# Patient Record
Sex: FEMALE | Race: WHITE | NOT HISPANIC OR LATINO | Employment: FULL TIME | URBAN - METROPOLITAN AREA
[De-identification: names, ages, dates, MRNs, and addresses within clinical notes are randomized per-mention and may not be internally consistent; named-entity substitution may affect disease eponyms.]

---

## 2017-02-16 ENCOUNTER — ALLSCRIPTS OFFICE VISIT (OUTPATIENT)
Dept: OTHER | Facility: OTHER | Age: 47
End: 2017-02-16

## 2017-02-16 ENCOUNTER — TRANSCRIBE ORDERS (OUTPATIENT)
Dept: ADMINISTRATIVE | Facility: HOSPITAL | Age: 47
End: 2017-02-16

## 2017-02-16 DIAGNOSIS — Z12.31 SCREENING MAMMOGRAM FOR HIGH-RISK PATIENT: Primary | ICD-10-CM

## 2017-06-15 ENCOUNTER — GENERIC CONVERSION - ENCOUNTER (OUTPATIENT)
Dept: OTHER | Facility: OTHER | Age: 47
End: 2017-06-15

## 2017-07-24 ENCOUNTER — HOSPITAL ENCOUNTER (OUTPATIENT)
Dept: MAMMOGRAPHY | Facility: CLINIC | Age: 47
Discharge: HOME | End: 2017-07-24
Payer: COMMERCIAL

## 2017-07-24 DIAGNOSIS — D05.10 INTRADUCTAL CARCINOMA IN SITU OF BREAST: ICD-10-CM

## 2017-07-24 PROCEDURE — G0279 TOMOSYNTHESIS, MAMMO: HCPCS

## 2017-07-24 PROCEDURE — G0204 DX MAMMO INCL CAD BI: HCPCS

## 2017-08-01 ENCOUNTER — GENERIC CONVERSION - ENCOUNTER (OUTPATIENT)
Dept: OTHER | Facility: OTHER | Age: 47
End: 2017-08-01

## 2017-08-08 ENCOUNTER — APPOINTMENT (OUTPATIENT)
Dept: LAB | Facility: MEDICAL CENTER | Age: 47
End: 2017-08-08
Payer: COMMERCIAL

## 2017-08-08 ENCOUNTER — TRANSCRIBE ORDERS (OUTPATIENT)
Dept: ADMINISTRATIVE | Facility: HOSPITAL | Age: 47
End: 2017-08-08

## 2017-08-08 DIAGNOSIS — Z00.8 HEALTH EXAMINATION IN POPULATION SURVEY: ICD-10-CM

## 2017-08-08 DIAGNOSIS — Z00.8 HEALTH EXAMINATION IN POPULATION SURVEY: Primary | ICD-10-CM

## 2017-08-08 LAB
CHOLEST SERPL-MCNC: 192 MG/DL (ref 50–200)
EST. AVERAGE GLUCOSE BLD GHB EST-MCNC: 108 MG/DL
HBA1C MFR BLD: 5.4 % (ref 4.2–6.3)
HDLC SERPL-MCNC: 53 MG/DL (ref 40–60)
LDLC SERPL CALC-MCNC: 116 MG/DL (ref 0–100)
TRIGL SERPL-MCNC: 113 MG/DL

## 2017-08-08 PROCEDURE — 83036 HEMOGLOBIN GLYCOSYLATED A1C: CPT

## 2017-08-08 PROCEDURE — 36415 COLL VENOUS BLD VENIPUNCTURE: CPT

## 2017-08-08 PROCEDURE — 80061 LIPID PANEL: CPT

## 2017-08-16 ENCOUNTER — ALLSCRIPTS OFFICE VISIT (OUTPATIENT)
Dept: OTHER | Facility: OTHER | Age: 47
End: 2017-08-16

## 2017-09-13 DIAGNOSIS — Z12.31 ENCOUNTER FOR SCREENING MAMMOGRAM FOR MALIGNANT NEOPLASM OF BREAST: ICD-10-CM

## 2017-10-09 ENCOUNTER — GENERIC CONVERSION - ENCOUNTER (OUTPATIENT)
Dept: OTHER | Facility: OTHER | Age: 47
End: 2017-10-09

## 2017-10-12 ENCOUNTER — GENERIC CONVERSION - ENCOUNTER (OUTPATIENT)
Dept: OTHER | Facility: OTHER | Age: 47
End: 2017-10-12

## 2017-10-12 PROCEDURE — G0145 SCR C/V CYTO,THINLAYER,RESCR: HCPCS | Performed by: OBSTETRICS & GYNECOLOGY

## 2017-10-14 ENCOUNTER — LAB REQUISITION (OUTPATIENT)
Dept: LAB | Facility: HOSPITAL | Age: 47
End: 2017-10-14
Payer: COMMERCIAL

## 2017-10-14 DIAGNOSIS — Z01.419 ENCOUNTER FOR GYNECOLOGICAL EXAMINATION WITHOUT ABNORMAL FINDING: ICD-10-CM

## 2017-10-26 LAB
LAB AP GYN PRIMARY INTERPRETATION: NORMAL
Lab: NORMAL

## 2017-10-28 ENCOUNTER — GENERIC CONVERSION - ENCOUNTER (OUTPATIENT)
Dept: OTHER | Facility: OTHER | Age: 47
End: 2017-10-28

## 2018-01-11 NOTE — PROGRESS NOTES
Assessment    1  Greater trochanteric bursitis of right hip (726 5) (M70 61)   2  Pain, pelvic, female (625 9) (R10 2)    Plan  Greater trochanteric bursitis of right hip    · Betamethasone Sod Phos & Acet 6 (3-3) MG/ML Injection Suspension   · Follow-up visit in 3 months Evaluation and Treatment  Follow-up  Status: Complete   Done: [id number]  Pain, pelvic, female    · 2 - Cheri IRIZARRY, Gaviota Fermin  (General Surgery) Physician Referral  Consult  1/26 @ 430p  Status: Active  Requested for: 20Jan2016  Care Summary provided  : Yes    Discussion/Summary    Patient seen by Dr Jeison Aviles as well as myself today  We reviewed the MRI findings with Connor Nunez  On today's exam, she does have some reproducible tenderness over her trochanter bursa  Her symptoms may be an atypical presentation of bursitis  We did offer her an injection into that area  We also recommended referral to a general surgeon to rule out any other physiological cause as there is nothing else orthopedic in nature that we can explain her symptoms  She'll follow up in 3 months  Chief Complaint    1  Hip Pain    History of Present Illness  HPI: Connor Nunez presents to the office today for followup evaluation of her right anterosuperior hip pain  Patient has undergone injections without relief therefore she was sent for an MRI  She is here today to review the results of her MRI  She states that the most recent injection did not provide her any relief, in fact she felt some pain radiating down the leg after the shot  The pain is slightly radiating to the more lateral aspect of her ASIS  Review of Systems    Constitutional: No fever, no chills, feels well, no tiredness, no recent weight gain or loss  Eyes: No complaints of eyesight problems, no red eyes  ENT: no loss of hearing, no nosebleeds, no sore throat  Cardiovascular: No complaints of chest pain, no palpitations, no leg claudication or lower extremity edema     Respiratory: no compliants of shortness of breath, no wheezing, no cough  Gastrointestinal: no complaints of abdominal pain, no constipation, no nausea or diarrhea, no vomiting, no bloody stools  Genitourinary: no complaints of dysuria, no incontinence  Musculoskeletal: as noted in HPI  Integumentary: no complaints of skin rash or lesion, no itching or dry skin, no skin wounds  Neurological: no complaints of headache, no confusion, no numbness or tingling, no dizziness  Endocrine: No complaints of muscle weakness, no feelings of weakness, no frequent urination, no excessive thirst    Psychiatric: No suicidal thoughts, no anxiety, no feelings of depression  ROS reviewed  Active Problems    1  Acute hip pain, right (719 45) (M25 551)   2  Carcinoma in situ, breast, ductal (233 0) (D05 10)   3  Chronic fatigue (780 79) (R53 82)   4  Encounter for routine gynecological examination with Papanicolaou smear of cervix   (V72 31,V76 2) (Z01 419,Z12 4)   5  Fracture of toe (826 0) (S92 919A)   6  GERD (gastroesophageal reflux disease) (530 81) (K21 9)   7  Greater trochanteric bursitis of right hip (726 5) (M70 61)   8  Seasonal allergies (477 9) (J30 2)   9  Tendinitis (726 90) (M77 9)    Past Medical History    · History of Abnormal white blood cell count (288 9) (D72 9)   · History of Benign paroxysmal positional vertigo (386 11) (H81 10)   · History of folliculitis (N22 6) (E94 7)   · History of radiation therapy (V15 3) (Z92 3)   · History of Neck pain (723 1) (M54 2)   · History of Seasickness (994 6)   · History of Visit for screening mammogram (V76 12) (Z12 31)    The active problems and past medical history were reviewed and updated today  Surgical History    · History of Breast Surgery Lumpectomy   · History of Colposcopy Cervix With Biopsy(S)    The surgical history was reviewed and updated today         Family History    · No pertinent family history    · Family history of Heart disease    · Family history of Diabetes    · Family history of Diabetes    · Family history of lung cancer (V16 1) (Z80 1)    · Family history of Pancreatic cancer    · Family history of Pancreatic carcinoma    · Family history of Breast cancer    The family history was reviewed and updated today  Social History    · Denied: Drug use   · Never a smoker   · Social alcohol use (F10 99)  The social history was reviewed and updated today  Current Meds   1  Daily Value Multivitamin Oral Tablet; Take 1 tablet daily; Therapy: (Recorded:90Mor2334) to Recorded   2  Fluticasone Propionate 50 MCG/ACT Nasal Suspension; USE 2 SPRAYS IN EACH   NOSTRIL ONCE DAILY; Therapy: 13Apr2015 to (Evaluate:07Apr2016); Last Rx:13Apr2015 Ordered    The medication list was reviewed and updated today  Allergies    1  Sulfa Drugs    Vitals   Recorded: [id number] 03:39PM   Heart Rate 96   Systolic 366   Diastolic 70   Height 5 ft 3 in   Weight 160 lb 6 08 oz   BMI Calculated 28 41   BSA Calculated 1 76     Physical Exam    Upon examination patient demonstrates good motion of the right hip  No reproducible pain  There is still some tenderness over the ASIS  No palpable mass  Some tenderness appreciated over the greater trochanter of the hip  Results/Data  * MRI HIP RIGHT WO CONTRAST [id number] 09:01AM Luke Ding     Test Name Result Flag Reference   MRI HIP RIGHT WO CONTRAST (Report)     MRI RIGHT HIP     INDICATION: slight sensitivity in rt illiac crest at anterior c  Patient states pain in right hip when bending and moving  No known injury or trauma  COMPARISON: None  TECHNIQUE: MR sequences were obtained of the right hip and pelvis including: Localizer, axial T2 fat sat, coronal T1/STIR, cone-down axial oblique PD of the affected hip, coronal PD of the affected hip, sagittal T2 fat sat of the affected hip  Images    were acquired on a 1 5 Juliana unit  Gadolinium was not used  FINDINGS:     RIGHT HIP:     -JOINT EFFUSION: None       -BONE MARROW SIGNAL AND ALIGNMENT: Normal marrow signal demonstrated without hip fracture or AVN  -ACETABULAR LABRUM: Intact  -TROCHANTERIC BURSA: Normal      LEFT HIP: (please note dedicated small field of view images were not made of the left hip joint limiting its evaluation )      -JOINT EFFUSION: None  -BONE MARROW SIGNAL AND ALIGNMENT: Normal marrow signal demonstrated without hip fracture or AVN  -ACETABULAR LABRUM: No gross abnormalities although evaluation is very limited  -TROCHANTERIC BURSA: Normal      REST OF PELVIS:     -BONE MARROW SIGNAL: Normal      -SI JOINTS AND SYMPHYSIS PUBIS: Intact  -VISUALIZED LUMBAR SPINE: Unremarkable  -MUSCULATURE: Intact with intact hamstring origins bilaterally  -PELVIC SOFT TISSUES: There is trace ascites in the pelvis, likely physiologic  SUBCUTANEOUS TISSUES: Normal       Normal MRI of the right hip  I personally reviewed the films/images/results in the office today  My interpretation follows  MRI Review MRI of the pelvis is reviewed which does not demonstrate any abnormalities  Procedure  Undersurface and the patient received 2 cc of lidocaine, 2 cc of Marcaine, and 2 cc of 6 mg betamethasone into the right trochanteric bursa  Injection was tolerated well the      Attending Note  Collaborating Physician Note: Collaborating Physician: I interviewed and examined the patient, I discussed the case with the Advanced Practitioner and reviewed the note, I supervised the Advanced Practitioner, I supervised the procedure performed by the Advanced Practitioner and I agree with the Advanced Practitioner note        Future Appointments    Date/Time Provider Specialty Site   01/28/2016 03:00 PM Ángel Denton MD Surgical Oncology [facility]     Signatures   Electronically signed by : Daniel Nav[facility]; Jan 20 2016  4:29PM EST                       (Author)    Electronically signed by : TIFFANIE Harris ; Jan 20 2016  4:51PM EST                       (Author)

## 2018-01-13 VITALS
SYSTOLIC BLOOD PRESSURE: 102 MMHG | HEART RATE: 70 BPM | BODY MASS INDEX: 28.41 KG/M2 | HEIGHT: 63 IN | WEIGHT: 160.31 LBS | TEMPERATURE: 98.1 F | DIASTOLIC BLOOD PRESSURE: 72 MMHG | RESPIRATION RATE: 16 BRPM

## 2018-01-13 VITALS
TEMPERATURE: 98.5 F | RESPIRATION RATE: 16 BRPM | DIASTOLIC BLOOD PRESSURE: 70 MMHG | SYSTOLIC BLOOD PRESSURE: 108 MMHG | BODY MASS INDEX: 28.94 KG/M2 | HEIGHT: 63 IN | HEART RATE: 74 BPM | WEIGHT: 163.31 LBS

## 2018-01-15 NOTE — RESULT NOTES
Verified Results  VAS UPPER LIMB VENOUS DUPLEX SCAN, UNILATERAL/LIMITED [id number] 03:06PM Rosie Luong Order Number: [id number]     Test Name Result Flag Reference   VAS UPPER LIMB VENOUS DUPLEX SCAN, UNILATERAL/LIMITED (Report)     [facility] REPORT   CLINICAL:   Indications: Right Limb Pain [M79 609]  Patient has a painful cord in her medial right upper arm and axilla  She has a   history of right breast cancer, lumpectomy, and radiation treatment about 1 5   years ago  Operative History:   Right Lumpectomy 2013   Risk Factors: The patient has no history of DVT, limb trauma or malignancy  Clinical: Right Upper Limb   There is complaint of pain  CONCLUSION:   Impression   RIGHT UPPER LIMB:   No evidence of acute or chronic deep vein thrombosis  No evidence of superficial thrombophlebitis noted  Doppler evaluation shows a normal response to augmentation maneuvers  Areas of the palpable cord on the medial upper right arm and in the right   axillary area, appear to correspond to very superficial, non-compressing veins,   suggesting superficial thrombophlebitis  Tech Note: Preliminary report given to Dr Glenda Pacheco by phone at 16:00 on   2/15/2016        SIGNATURE:   Electronically Signed by: Kelly Ruiz MD, RPVI on 2016-02-15 07:27:21 PM

## 2018-01-17 NOTE — RESULT NOTES
Verified Results  (1) THIN PREP PAP WITH IMAGING [id number] 11:35AM Loraine Moss     Test Name Result Flag Reference   LAB AP CASE REPORT (Report)     Gynecologic Cytology Report            Case: [id number]                  Authorizing Provider: Na Sauceda MD     Collected:      10/12/2017           First Screen:     OG Rodríguez Received:      10/16/2017 1024        Specimen:  LIQUID-BASED PAP, SCREENING, Endocervical   LAB AP GYN PRIMARY INTERPRETATION      Negative for intraepithelial lesion or malignancy  Electronically signed by OG Rodríguez on 10/26/2017 at 11:35 AM   LAB AP GYN SPECIMEN ADEQUACY      Satisfactory for evaluation  Absence of endocervical/transformation zone component  LAB AP GYN ADDITIONAL INFORMATION (Report)     [employer] FDA approved ,  and ThinPrep Imaging System are   utilized with strict adherence to the 's instruction manual to   prepare gynecologic and non-gynecologic cytology specimens for the   production of ThinPrep slides as well as for gynecologic ThinPrep imaging  These processes have been validated by our laboratory and/or by the     The Pap test is not a diagnostic procedure and should not be used as the   sole means to detect cervical cancer  It is only a screening procedure to   aid in the detection of cervical cancer and its precursors  Both   false-negative and false-positive results have been experienced  Your   patient's test result should be interpreted in this context together with   the history and clinical findings

## 2018-01-22 VITALS
WEIGHT: 161 LBS | DIASTOLIC BLOOD PRESSURE: 64 MMHG | SYSTOLIC BLOOD PRESSURE: 112 MMHG | HEIGHT: 63 IN | BODY MASS INDEX: 28.53 KG/M2

## 2018-02-16 ENCOUNTER — OFFICE VISIT (OUTPATIENT)
Dept: SURGICAL ONCOLOGY | Facility: CLINIC | Age: 48
End: 2018-02-16
Payer: COMMERCIAL

## 2018-02-16 VITALS
WEIGHT: 163 LBS | TEMPERATURE: 98.1 F | HEART RATE: 92 BPM | BODY MASS INDEX: 28.88 KG/M2 | SYSTOLIC BLOOD PRESSURE: 112 MMHG | DIASTOLIC BLOOD PRESSURE: 78 MMHG | HEIGHT: 63 IN | RESPIRATION RATE: 16 BRPM

## 2018-02-16 DIAGNOSIS — D05.11 DUCTAL CARCINOMA IN SITU (DCIS) OF RIGHT BREAST: Primary | ICD-10-CM

## 2018-02-16 PROCEDURE — 99213 OFFICE O/P EST LOW 20 MIN: CPT | Performed by: NURSE PRACTITIONER

## 2018-02-16 RX ORDER — FLUTICASONE PROPIONATE 50 MCG
SPRAY, SUSPENSION (ML) NASAL DAILY
COMMUNITY
Start: 2015-04-13 | End: 2021-06-22 | Stop reason: SDUPTHER

## 2018-02-16 NOTE — PROGRESS NOTES
Surgical Oncology Follow Up       [address] [facility],[address] [place] [address]  [facility] [address]  1970  [id number]  [address] [facility],[address] [place] [address]  [facility] [place] [id number]    Chief Complaint   Patient presents with    Breast Cancer       Assessment/Plan:  1  Ductal carcinoma in situ (DCIS) of right breast    - Mammo diagnostic bilateral w 3d & cad; Future  - 6 mo follow up visit      Discussion/Summary: Patient is a 57year old female that presents today for a six-month follow-up for right breast DCIS diagnosed in September 2014  At that time she underwent a right lumpectomy and completed whole breast radiation therapy  She had a bilateral diagnostic mammogram performed in July 2017 which was BI-RADS 2  She has no new complaints today  Denies headaches, back pain, bone pain, cough, SOB, abdominal pain  No worrisome exam findings  We will order a 3D diagnotic mammogram for July 2018 and we will see the patient back in 6 months or sooner if the need arises  She was instructed to call with any new concerns or symptoms  All of her questions were answered  History of Present Illness:        Ductal carcinoma in situ (DCIS) of right breast    8/25/2014 Initial Diagnosis     Ductal carcinoma in situ (DCIS) of right breast         9/23/2014 Surgery     Right lumpectomy (Dr Savannah Hernandez)          - 12/16/2014 Radiation     WBRT (Dr Antonino Sarabia, [place], [place])         2015 Genetic Testing     BRCA1, BRCA2 negative             -Interval History: Patient presents today for a 6 month follow up visit for right breast cancer diagnosed in 2014  She has no new complaints today  She had a bilateral mammogram performed on July 2017 which was BIRADS 2       Review of Systems:  Review of Systems   Constitutional: Negative for activity change, appetite change, chills, fatigue, fever and unexpected weight change  HENT: Negative for trouble swallowing  Eyes: Negative for pain, redness and visual disturbance  Respiratory: Negative for cough, shortness of breath and wheezing  Cardiovascular: Negative for chest pain, palpitations and leg swelling  Gastrointestinal: Negative for abdominal pain, constipation, diarrhea, nausea and vomiting  Endocrine: Negative for cold intolerance and heat intolerance  Musculoskeletal: Negative for arthralgias, back pain, gait problem and myalgias  Skin: Negative for color change and rash  Neurological: Negative for dizziness, syncope, light-headedness, numbness and headaches  Hematological: Negative for adenopathy  Psychiatric/Behavioral: Negative for agitation and confusion  All other systems reviewed and are negative  Patient Active Problem List   Diagnosis    Ductal carcinoma in situ (DCIS) of right breast    Chronic fatigue    Dermatitis    GERD (gastroesophageal reflux disease)    Greater trochanteric bursitis of right hip    Pain, pelvic, female    Seasonal allergies     Past Medical History:   Diagnosis Date    Benign paroxysmal vertigo      Past Surgical History:   Procedure Laterality Date    BREAST LUMPECTOMY W/ NEEDLE LOCALIZATION Right 09/23/2014    Dr Dempsey Heritaflynn / [facility]       Family History   Problem Relation Age of Onset    Pancreatic cancer Maternal Grandfather     Lung cancer Paternal Grandmother      Social History     Social History    Marital status: /Civil Union     Spouse name: N/A    Number of children: N/A    Years of education: N/A     Occupational History    Not on file       Social History Main Topics    Smoking status: Never Smoker    Smokeless tobacco: Never Used    Alcohol use Not on file    Drug use: Unknown    Sexual activity: Not on file     Other Topics Concern    Not on file     Social History Narrative    No narrative on file       Current Outpatient Prescriptions:     fluticasone (FLONASE) 50 mcg/act nasal spray, into each nostril Daily, Disp: , Rfl:     Multiple Vitamin (DAILY VALUE MULTIVITAMIN PO), Take 1 tablet by mouth daily, Disp: , Rfl:   Allergies   Allergen Reactions    Sulfa Antibiotics Rash     Vitals:    02/16/18 1456   BP: 112/78   Pulse: 92   Resp: 16   Temp: 98 1 °F (36 7 °C)       Physical Exam   Constitutional: She is oriented to person, place, and time  Vital signs are normal  She appears well-developed and well-nourished  No distress  HENT:   Head: Normocephalic and atraumatic  Neck: Normal range of motion  Cardiovascular: Normal rate, regular rhythm and normal heart sounds  Pulmonary/Chest: Effort normal and breath sounds normal    Bilateral breasts were examined in the sitting and supine position  Right breast surgical scar  There are no masses, skin nodules, nipple changes or nipple discharge  There is no bilateral supraclavicular or axillary lymphadenopathy noted  Abdominal: Soft  Normal appearance  She exhibits no mass  There is no hepatosplenomegaly  There is no tenderness  Musculoskeletal: Normal range of motion  Lymphadenopathy:     She has no axillary adenopathy  Right: No supraclavicular adenopathy present  Left: No supraclavicular adenopathy present  Neurological: She is alert and oriented to person, place, and time  Skin: Skin is warm, dry and intact  No rash noted  She is not diaphoretic  Psychiatric: She has a normal mood and affect  Her speech is normal    Vitals reviewed  Advance Care Planning/Advance Directives:  Discussed disease status, cancer treatment plans and/or cancer treatment goals with the patient

## 2018-02-20 ENCOUNTER — TELEPHONE (OUTPATIENT)
Dept: SURGICAL ONCOLOGY | Facility: CLINIC | Age: 48
End: 2018-02-20

## 2018-03-05 ENCOUNTER — TELEPHONE (OUTPATIENT)
Dept: SURGICAL ONCOLOGY | Facility: CLINIC | Age: 48
End: 2018-03-05

## 2018-03-05 NOTE — TELEPHONE ENCOUNTER
Spoke with patient- she is interested in meeting with our genetics dept again to discuss further testing  Our office will call to schedule this for her            ----- Message from Herminia Navarro sent at 2/19/2018  9:33 AM EST -----  Regarding: RE: Further genetic testing? If she is interested in additional testing, I would be happy to speak with her about it  She was a bit hesitant about genetic testing when I first saw her, and sometimes the larger tests can be a bit overwhelming for patients         ----- Message -----  From: JASON Robbins  Sent: 2/16/2018   3:27 PM  To: Herminia Navarro  Subject: Further genetic testing? Ayaan Strickland, I just saw Pily Craft as a breast cancer f/u  I noticed when she was tested she was only tested for BRCA mutations  Is this someone you would recommend receive further testing with a greater panel?      Thanks

## 2018-04-12 ENCOUNTER — OFFICE VISIT (OUTPATIENT)
Dept: FAMILY MEDICINE CLINIC | Facility: CLINIC | Age: 48
End: 2018-04-12
Payer: COMMERCIAL

## 2018-04-12 VITALS
HEIGHT: 63 IN | DIASTOLIC BLOOD PRESSURE: 82 MMHG | WEIGHT: 170.2 LBS | BODY MASS INDEX: 30.16 KG/M2 | SYSTOLIC BLOOD PRESSURE: 116 MMHG

## 2018-04-12 DIAGNOSIS — S39.012A STRAIN OF LUMBAR REGION, INITIAL ENCOUNTER: Primary | ICD-10-CM

## 2018-04-12 PROCEDURE — 3008F BODY MASS INDEX DOCD: CPT | Performed by: PHYSICIAN ASSISTANT

## 2018-04-12 PROCEDURE — 99214 OFFICE O/P EST MOD 30 MIN: CPT | Performed by: PHYSICIAN ASSISTANT

## 2018-04-12 RX ORDER — CYCLOBENZAPRINE HCL 5 MG
5 TABLET ORAL 3 TIMES DAILY PRN
Qty: 30 TABLET | Refills: 0 | Status: SHIPPED | OUTPATIENT
Start: 2018-04-12 | End: 2018-08-16 | Stop reason: HOSPADM

## 2018-04-12 RX ORDER — PREDNISONE 10 MG/1
TABLET ORAL
Qty: 20 TABLET | Refills: 0 | Status: SHIPPED | OUTPATIENT
Start: 2018-04-12 | End: 2018-08-16 | Stop reason: HOSPADM

## 2018-04-12 NOTE — PROGRESS NOTES
Assessment/Plan:    Can try a heating pad in 20 minute increments over the painful area  Continue OTC Motrin as directed  Diagnoses and all orders for this visit:    Strain of lumbar region, initial encounter  -     cyclobenzaprine (FLEXERIL) 5 mg tablet; Take 1 tablet (5 mg total) by mouth 3 (three) times a day as needed for muscle spasms for up to 21 doses  -     predniSONE 10 mg tablet; Days 1 and 2 take 4 tablets daily, days 3 and 4 take 3 tablets daily, days 5 and 6 and 2 tablets daily, days 7 and 8 take 1 tablet daily  Subjective:      Patient ID: Lokesh Vera is a 57 y o  female  Back Pain   This is a new problem  The current episode started yesterday  The problem occurs constantly  The problem is unchanged  The pain is present in the lumbar spine  The quality of the pain is described as aching, stabbing and cramping  The pain does not radiate  The pain is moderate  The pain is the same all the time  The symptoms are aggravated by bending, coughing, position and twisting  Stiffness is present all day  Pertinent negatives include no abdominal pain, bladder incontinence, bowel incontinence, chest pain, fever, leg pain, numbness, paresis, paresthesias, tingling or weakness  She has tried ice and NSAIDs for the symptoms  The treatment provided mild relief  Injury occurred yesterday while lifting the steps to her camper back in to a folded position  She twisted while lifting and felt the pain in her back immediately  The following portions of the patient's history were reviewed and updated as appropriate:   She  has a past medical history of Benign paroxysmal vertigo    She   Patient Active Problem List    Diagnosis Date Noted    Dermatitis 06/10/2016    Pain, pelvic, female 01/20/2016    Greater trochanteric bursitis of right hip 09/22/2015    Chronic fatigue 12/31/2014    Ductal carcinoma in situ (DCIS) of right breast 09/04/2014    GERD (gastroesophageal reflux disease) 09/04/2014    Seasonal allergies 09/04/2014     She  has a past surgical history that includes Breast lumpectomy w/ needle localization (Right, 09/23/2014) and Colposcopy w/ biopsy / curettage  Her family history includes Lung cancer in her paternal grandmother; Pancreatic cancer in her maternal grandfather  She  reports that she has never smoked  She has never used smokeless tobacco  Her alcohol and drug histories are not on file  Current Outpatient Prescriptions   Medication Sig Dispense Refill    fluticasone (FLONASE) 50 mcg/act nasal spray into each nostril Daily      Multiple Vitamin (DAILY VALUE MULTIVITAMIN PO) Take 1 tablet by mouth daily      cyclobenzaprine (FLEXERIL) 5 mg tablet Take 1 tablet (5 mg total) by mouth 3 (three) times a day as needed for muscle spasms for up to 21 doses 30 tablet 0    predniSONE 10 mg tablet Days 1 and 2 take 4 tablets daily, days 3 and 4 take 3 tablets daily, days 5 and 6 and 2 tablets daily, days 7 and 8 take 1 tablet daily  20 tablet 0     No current facility-administered medications for this visit  Current Outpatient Prescriptions on File Prior to Visit   Medication Sig    fluticasone (FLONASE) 50 mcg/act nasal spray into each nostril Daily    Multiple Vitamin (DAILY VALUE MULTIVITAMIN PO) Take 1 tablet by mouth daily     No current facility-administered medications on file prior to visit  She is allergic to sulfa antibiotics       Review of Systems   Constitutional: Negative for chills, fatigue and fever  Respiratory: Negative for shortness of breath and wheezing  Cardiovascular: Negative for chest pain  Gastrointestinal: Negative for abdominal pain and bowel incontinence  Genitourinary: Negative for bladder incontinence  Musculoskeletal: Positive for back pain, gait problem (walking slow due to back pain) and myalgias  Neurological: Negative for tingling, weakness, numbness and paresthesias           Objective:      /82 (BP Location: Left arm, Patient Position: Sitting)   Ht 5' 3" (1 6 m)   Wt 77 2 kg (170 lb 3 2 oz)   BMI 30 15 kg/m²          Physical Exam   Constitutional: She is oriented to person, place, and time  Cardiovascular: Normal rate, regular rhythm and normal heart sounds  Pulmonary/Chest: Effort normal and breath sounds normal  No respiratory distress  She has no wheezes  Musculoskeletal: She exhibits tenderness  Back:    Neurological: She is alert and oriented to person, place, and time  Skin: Skin is warm and dry  No erythema  Psychiatric: She has a normal mood and affect  Her behavior is normal  Judgment and thought content normal    Vitals reviewed

## 2018-06-29 ENCOUNTER — LAB (OUTPATIENT)
Dept: LAB | Facility: HOSPITAL | Age: 48
End: 2018-06-29

## 2018-06-29 ENCOUNTER — TRANSCRIBE ORDERS (OUTPATIENT)
Dept: ADMINISTRATIVE | Facility: HOSPITAL | Age: 48
End: 2018-06-29

## 2018-06-29 DIAGNOSIS — Z00.8 HEALTH EXAMINATION IN POPULATION SURVEY: Primary | ICD-10-CM

## 2018-06-29 DIAGNOSIS — Z00.8 HEALTH EXAMINATION IN POPULATION SURVEY: ICD-10-CM

## 2018-06-29 LAB
CHOLEST SERPL-MCNC: 193 MG/DL (ref 50–200)
EST. AVERAGE GLUCOSE BLD GHB EST-MCNC: 105 MG/DL
HBA1C MFR BLD: 5.3 % (ref 4.2–6.3)
HDLC SERPL-MCNC: 48 MG/DL (ref 40–60)
LDLC SERPL CALC-MCNC: 129 MG/DL (ref 0–100)
NONHDLC SERPL-MCNC: 145 MG/DL
TRIGL SERPL-MCNC: 78 MG/DL

## 2018-06-29 PROCEDURE — 36415 COLL VENOUS BLD VENIPUNCTURE: CPT

## 2018-06-29 PROCEDURE — 80061 LIPID PANEL: CPT

## 2018-06-29 PROCEDURE — 83036 HEMOGLOBIN GLYCOSYLATED A1C: CPT

## 2018-07-02 NOTE — PROGRESS NOTES
Please call the patient regarding her abnormal result   Cholesterol isn't bad but the LDL cholesterol was a little high if she can tweak her diet that would be the best thing to do if she can't tweaked her diet she may want to consider going on a low dose statin like 10 mg of lip generic Lipitor

## 2018-07-25 ENCOUNTER — HOSPITAL ENCOUNTER (OUTPATIENT)
Dept: MAMMOGRAPHY | Facility: CLINIC | Age: 48
Discharge: HOME | End: 2018-07-25
Payer: COMMERCIAL

## 2018-07-25 DIAGNOSIS — D05.11 DUCTAL CARCINOMA IN SITU (DCIS) OF RIGHT BREAST: ICD-10-CM

## 2018-07-25 PROCEDURE — 77066 DX MAMMO INCL CAD BI: CPT

## 2018-07-25 PROCEDURE — G0279 TOMOSYNTHESIS, MAMMO: HCPCS

## 2018-08-16 ENCOUNTER — OFFICE VISIT (OUTPATIENT)
Dept: SURGICAL ONCOLOGY | Facility: CLINIC | Age: 48
End: 2018-08-16
Payer: COMMERCIAL

## 2018-08-16 VITALS
SYSTOLIC BLOOD PRESSURE: 110 MMHG | HEART RATE: 70 BPM | DIASTOLIC BLOOD PRESSURE: 70 MMHG | BODY MASS INDEX: 28.53 KG/M2 | WEIGHT: 161 LBS | HEIGHT: 63 IN | RESPIRATION RATE: 14 BRPM | TEMPERATURE: 97.9 F

## 2018-08-16 DIAGNOSIS — D05.11 DUCTAL CARCINOMA IN SITU (DCIS) OF RIGHT BREAST: Primary | ICD-10-CM

## 2018-08-16 PROCEDURE — 99213 OFFICE O/P EST LOW 20 MIN: CPT | Performed by: NURSE PRACTITIONER

## 2018-08-16 NOTE — PROGRESS NOTES
Surgical Oncology Follow Up       [address] [place] [address]  CANCER CARE [facility] SURGICAL ONCOLOGY [address]  1970  [id number]  [address] [place] [address]  CANCER [facility] SURGICAL ONCOLOGY [facility] [id number]    Chief Complaint   Patient presents with    Breast Cancer     6 month       Assessment/Plan:  1  Ductal carcinoma in situ (DCIS) of right breast  - 6 mo f/u visit     Discussion/Summary: Patient is a 57year old female that presents today for a six-month follow-up for right breast cancer diagnosed in September 2014  Her pathology revealed DCIS, %, %  At that time she underwent a right lumpectomy and completed whole breast radiation therapy  She had a bilateral diagnostic mammogram performed on July 25, 2018 which was BI-RADS 1  She has no new complaints today  Denies headaches, back pain, bone pain, cough, SOB, abdominal pain  No worrisome exam findings  She was interested in meeting with our genetics counselor to see if she would be covered for testing of a larger panel, however, states that she ultimately canceled her appt because she didn't receive confirmation that the visit would be covered  She is interested in meeting with our genetics counselor in the future  I informed the patient that we do not currently have a genetic counselor but she could certain look into one of our network  She states she would rather wait until [facility] is offering genetic counseling again  I have encouraged her to call our office in a couple months, or we could readdress at her next visit  She is in agreement with this plan- she states she is ok with waiting for further testing  We will see the patient back in 6 months or sooner if the need arises  She was instructed to call with any new concerns or symptoms  All of her questions were answered        History of Present Illness:        Ductal carcinoma in situ (DCIS) of right breast    8/25/2014 Initial Diagnosis     Ductal carcinoma in situ (DCIS) of right breast         9/23/2014 Surgery     Right lumpectomy (Dr Jess Farah)          - 12/16/2014 Radiation     WBRT (Dr Maurice Ovalle, [place], [place])         2015 Genetic Testing     BRCA1, BRCA2 negative             -Interval History:  Patient presents today for a six-month follow-up for the right breast DCIS diagnosed in 2014  She had a bilateral mammogram performed on July 25th 2018 which was BI-RADS 1  Review of Systems:  Review of Systems   Constitutional: Negative for activity change, appetite change, chills, fatigue, fever and unexpected weight change  HENT: Negative for trouble swallowing  Eyes: Negative for pain, redness and visual disturbance  Respiratory: Negative for cough, shortness of breath and wheezing  Cardiovascular: Negative for chest pain, palpitations and leg swelling  Gastrointestinal: Negative for abdominal pain, constipation, diarrhea, nausea and vomiting  Endocrine: Negative for cold intolerance and heat intolerance  Musculoskeletal: Negative for arthralgias, back pain, gait problem and myalgias  Skin: Negative for color change and rash  Neurological: Negative for dizziness, syncope, light-headedness, numbness and headaches  Hematological: Negative for adenopathy  Psychiatric/Behavioral: Negative for agitation and confusion  All other systems reviewed and are negative        Patient Active Problem List   Diagnosis    Ductal carcinoma in situ (DCIS) of right breast    Chronic fatigue    Dermatitis    GERD (gastroesophageal reflux disease)    Greater trochanteric bursitis of right hip    Pain, pelvic, female    Seasonal allergies     Past Medical History:   Diagnosis Date    Benign paroxysmal vertigo      Past Surgical History:   Procedure Laterality Date    BREAST LUMPECTOMY W/ NEEDLE LOCALIZATION Right 09/23/2014    Dr Gautam Schofield BIOPSY / CURETTAGE       Family History   Problem Relation Age of Onset    Pancreatic cancer Maternal Grandfather     Lung cancer Paternal Grandmother      Social History     Social History    Marital status: /Civil Union     Spouse name: N/A    Number of children: N/A    Years of education: N/A     Occupational History    Not on file  Social History Main Topics    Smoking status: Never Smoker    Smokeless tobacco: Never Used    Alcohol use Not on file    Drug use: Unknown    Sexual activity: Not on file     Other Topics Concern    Not on file     Social History Narrative    No narrative on file       Current Outpatient Prescriptions:     fluticasone (FLONASE) 50 mcg/act nasal spray, into each nostril Daily, Disp: , Rfl:     Multiple Vitamin (DAILY VALUE MULTIVITAMIN PO), Take 1 tablet by mouth daily, Disp: , Rfl:   Allergies   Allergen Reactions    Sulfa Antibiotics Rash     Vitals:    08/16/18 1452   BP: 110/70   Pulse: 70   Resp: 14   Temp: 97 9 °F (36 6 °C)       Physical Exam   Constitutional: She is oriented to person, place, and time  Vital signs are normal  She appears well-developed and well-nourished  No distress  HENT:   Head: Normocephalic and atraumatic  Neck: Normal range of motion  Cardiovascular: Normal rate, regular rhythm and normal heart sounds  Pulmonary/Chest: Effort normal and breath sounds normal    Bilateral breasts were examined in the sitting and supine position  Right breast surgical scar present  There are no masses, skin nodules, nipple changes or nipple discharge  There is no bilateral supraclavicular or axillary lymphadenopathy noted  Abdominal: Soft  Normal appearance  She exhibits no mass  There is no hepatosplenomegaly  There is no tenderness  Musculoskeletal: Normal range of motion  Lymphadenopathy:     She has no axillary adenopathy  Right: No supraclavicular adenopathy present  Left: No supraclavicular adenopathy present  Neurological: She is alert and oriented to person, place, and time  Skin: Skin is warm, dry and intact  No rash noted  She is not diaphoretic  Psychiatric: She has a normal mood and affect  Her speech is normal    Vitals reviewed  Results:    Imaging  Mammo Diagnostic Bilateral W 3d & Cad    Result Date: 7/25/2018  Narrative: Patient History: Patient has history of cancer in the right breast at age 45 and had previous chest radiation therapy at age 45  Patient had tested negative for BRCA1  Family history of pancreatic cancer at age 79 in maternal grandfather  Radiation therapy of the right breast, October 2014  Malignant lumpectomy of the right breast, September 23, 2014  Pathology Report of both breasts, September 23, 2014  Malignant WB stereo breast biopsy of the right breast, August 22, 2014  Pathology Report of both breasts, August 22, 2014  Patient has never smoked  Patient's BMI is 26 6  Reason for exam: history of breast cancer, conservation therapy  Mammo Diagnostic Bilateral W DBT and CAD: July 25, 2018 - Check In #: [de-identified] 2D/3D Procedure 3D views: Bilateral MLO view(s) were taken  2D views: Bilateral CC view(s) were taken  Technologist: Taffy Carrel, R T (WILLIAM)(M) Prior study comparison: July 24, 2017, mammo diagnostic bilateral W DBT and CAD performed at [address] [place] [address]  July 7, 2016, mammo diagnostic bilateral W CAD performed at [address] [place] [address]  June 30, 2015, bilateral WB digitl bilat keny performed at [address] [place] [address]  August 11, 2014, bilateral digital screening mammogram, performed at [address] [facility]  There are scattered fibroglandular densities  No dominant soft tissue mass, architectural distortion or suspicious calcifications are noted in either breast   The skin and nipple contours are within normal limits  No significant changes when compared with prior studies   ACR BI-RADS® Assessments: BiRad:1 - Negative Recommendation: Routine screening mammogram of both breasts in 1 year  A reminder letter will be scheduled  The patient is scheduled in a reminder system for screening mammography  8-10% of cancers will be missed on mammography  Management of a palpable abnormality must be based on clinical grounds  Patients will be notified of their results via letter from our facility  Accredited by [employer] of Rad[employer] and FDA  Transcription Location: [address] [place], [place] [address]way: [id number] Risk Value(s): Myriad Table: 4 7%      I reviewed the above imaging data  Advance Care Planning/Advance Directives:  Discussed disease status, cancer treatment plans and/or cancer treatment goals with the patient

## 2018-09-17 ENCOUNTER — ANNUAL EXAM (OUTPATIENT)
Dept: OBGYN CLINIC | Facility: CLINIC | Age: 48
End: 2018-09-17
Payer: COMMERCIAL

## 2018-09-17 VITALS — WEIGHT: 160 LBS | BODY MASS INDEX: 28.34 KG/M2 | SYSTOLIC BLOOD PRESSURE: 124 MMHG | DIASTOLIC BLOOD PRESSURE: 66 MMHG

## 2018-09-17 DIAGNOSIS — Z01.419 ENCOUNTER FOR WELL WOMAN EXAM WITH ROUTINE GYNECOLOGICAL EXAM: Primary | ICD-10-CM

## 2018-09-17 DIAGNOSIS — Z12.4 ENCOUNTER FOR PAPANICOLAOU SMEAR FOR CERVICAL CANCER SCREENING: ICD-10-CM

## 2018-09-17 PROCEDURE — G0145 SCR C/V CYTO,THINLAYER,RESCR: HCPCS | Performed by: OBSTETRICS & GYNECOLOGY

## 2018-09-17 PROCEDURE — 99396 PREV VISIT EST AGE 40-64: CPT | Performed by: OBSTETRICS & GYNECOLOGY

## 2018-09-17 NOTE — PROGRESS NOTES
ASSESSMENT & PLAN: Mary Chao is a 57 y o  No obstetric history on file  with normal gynecologic exam     1   Routine well woman exam done today  2  Pap and HPV:  The patient's last pap and hpv was 2017  It was normal     Pap and cotesting was done today  Current ASCCP Guidelines reviewed  Requesting annual paps  3  Mammogram ordered  4  The following were reviewed in today's visit: breast self exam  5  Sees Dr Nafisa Wright - DCIS    CC:  Annual Gynecologic Examination    HPI: Mary Chao is a 57 y o  No obstetric history on file  who presents for annual gynecologic examination  She has the following concerns:  none    Health Maintenance:    She wears her seatbelt routinely  She does perform regular monthly self breast exams  She feels safe at home  Patient Active Problem List   Diagnosis    Ductal carcinoma in situ (DCIS) of right breast    Chronic fatigue    Dermatitis    GERD (gastroesophageal reflux disease)    Greater trochanteric bursitis of right hip    Pain, pelvic, female    Seasonal allergies       Past Medical History:   Diagnosis Date    Benign paroxysmal vertigo        Past Surgical History:   Procedure Laterality Date    BREAST LUMPECTOMY W/ NEEDLE LOCALIZATION Right 09/23/2014    Dr Holly Grover / [place]         Past OB/Gyn History:  OB History     No data available           Pt does not have menstrual issues     History of sexually transmitted infection: No   History of abnormal pap smears: No      Patient is currently sexually active  heterosexual  The current method of family planning is none  Family History   Problem Relation Age of Onset    Pancreatic cancer Maternal Grandfather     Lung cancer Paternal Grandmother        Social History:  Social History     Social History    Marital status: /Civil Union     Spouse name: N/A    Number of children: N/A    Years of education: N/A     Occupational History    Not on file  Social History Main Topics    Smoking status: Never Smoker    Smokeless tobacco: Never Used    Alcohol use Yes    Drug use: No    Sexual activity: Yes     Partners: Male     Birth control/ protection: None     Other Topics Concern    Not on file     Social History Narrative    No narrative on file        Allergies   Allergen Reactions    Sulfa Antibiotics Rash       Current Outpatient Prescriptions:     fluticasone (FLONASE) 50 mcg/act nasal spray, into each nostril Daily, Disp: , Rfl:     Multiple Vitamin (DAILY VALUE MULTIVITAMIN PO), Take 1 tablet by mouth daily, Disp: , Rfl:     Review of Systems:    Review of Systems  Constitutional :no fever, feels well, no tiredness, no recent weight gain or loss  ENT: no ear ache, no loss of hearing, no nosebleeds or nasal discharge, no sore throat or hoarseness  Cardiovascular: no complaints of slow or fast heart beat, no chest pain, no palpitations, no leg claudication or lower extremity edema  Respiratory: no complaints of shortness of shortness of breath, no JEFF  Breasts:no complaints of breast pain, breast lump, or nipple discharge  Gastrointestinal: no complaints of abdominal pain, constipation, nausea, vomiting, or diarrhea or bloody stools  Genitourinary : no complaints of dysuria, incontinence, pelvic pain, no dysmenorrhea, vaginal discharge or abnormal vaginal bleeding and as noted in HPI  Musculoskeletal: no complaints of arthralgia, no myalgia, no joint swelling or stiffness, no limb pain or swelling    Integumentary: no complaints of skin rash or lesion, itching or dry skin  Neurological: no complaints of headache, no confusion, no numbness or tingling, no dizziness or fainting    Objective      /66   Wt 72 6 kg (160 lb)   BMI 28 34 kg/m²     General:   appears stated age, cooperative, alert normal mood and affect   Neck: normal, supple,trachea midline, no masses   Heart: regular rate and rhythm, S1, S2 normal, no murmur, click, rub or gallop   Lungs: clear to auscultation bilaterally   Breasts: normal appearance, no masses or tenderness, Inspection negative, No nipple retraction or dimpling, No nipple discharge or bleeding, No axillary or supraclavicular adenopathy, Normal to palpation without dominant masses, scar from prior biopsy noted right breast   Abdomen: soft, non-tender, without masses or organomegaly   Vulva: normal, normal female genitalia, Bartholin's, Urethra, [place] normal, no lesions, normal female hair distribution   Vagina: normal vagina, no discharge, exudate, lesion, or erythema   Urethra: normal   Cervix: Normal, no discharge  PAP done  Uterus: normal size, contour, position, consistency, mobility, non-tender   Adnexa: normal adnexa   Lymphatic palpation of lymph nodes in neck, axilla, groin and/or other locations: no lymphadenopathy or masses noted   Skin normal skin turgor and no rashes     Psychiatric orientation to person, place, and time: normal  mood and affect: normal

## 2018-09-19 LAB
LAB AP GYN PRIMARY INTERPRETATION: NORMAL
Lab: NORMAL

## 2019-02-26 ENCOUNTER — OFFICE VISIT (OUTPATIENT)
Dept: SURGICAL ONCOLOGY | Facility: CLINIC | Age: 49
End: 2019-02-26
Payer: COMMERCIAL

## 2019-02-26 VITALS
WEIGHT: 154 LBS | TEMPERATURE: 97.7 F | HEART RATE: 72 BPM | HEIGHT: 63 IN | SYSTOLIC BLOOD PRESSURE: 108 MMHG | RESPIRATION RATE: 18 BRPM | BODY MASS INDEX: 27.29 KG/M2 | DIASTOLIC BLOOD PRESSURE: 60 MMHG

## 2019-02-26 DIAGNOSIS — Z80.9 FAMILY HISTORY OF CANCER: ICD-10-CM

## 2019-02-26 DIAGNOSIS — Z85.3 HISTORY OF RIGHT BREAST CANCER: Primary | ICD-10-CM

## 2019-02-26 DIAGNOSIS — Z08 ENCOUNTER FOR FOLLOW-UP EXAMINATION AFTER COMPLETED TREATMENT FOR MALIGNANT NEOPLASM: ICD-10-CM

## 2019-02-26 DIAGNOSIS — N64.4 PAIN OF LEFT BREAST: ICD-10-CM

## 2019-02-26 PROCEDURE — 99214 OFFICE O/P EST MOD 30 MIN: CPT | Performed by: NURSE PRACTITIONER

## 2019-02-26 NOTE — PROGRESS NOTES
Surgical Oncology Follow Up       [address] [place] [address],[address] [facility]  CANCER CARE [facility] SURGICAL ONCOLOGY [address]  1970  [id number]  [address] [place] [address],[address] [place] [address]  CANCER CARE [facility] SURGICAL ONCOLOGY [facility] [id number]    Chief Complaint   Patient presents with    Breast Cancer     Pt is here for a six month follow up       1  History of right breast cancer  - Mammo diagnostic bilateral w 3d & cad; Future  - 6 mo f/u visit    2  Encounter for follow-up examination after completed treatment for malignant neoplasm    3  Pain of left breast  - Mammo diagnostic left w 3d & cad; Future  - US breast left limited (diagnostic); Future  If imaging normal:  - Wear a supportive bra  Sports bras work well  - Decrease salty food intake  - Decrease caffeine intake  - Evening Primrose Oil - 1 mg capsule three times/day  Take for 1-2 months     -If breast pain is not relieved, you can stop taking  4  Family history of cancer  - patient would like to think about pursing further genetic testing- options include testing through [facility] at the [facility] office- she will call if she wishes to proceed      Discussion/Summary: Patient is a 54year old female that presents today for a six-month follow-up for right breast cancer diagnosed in September 2014  Her pathology revealed DCIS, %, %  At that time she underwent a right lumpectomy and completed whole breast radiation therapy  She had a bilateral diagnostic mammogram performed on July 25, 2018 which was BI-RADS 1  She complaints today of left upper outer breast pain x2 weeks  She states that she 1st noticed this while doing a self-breast exam   She does not appreciate any lumps  She denies headaches, back pain, bone pain, cough, shortness of breath, abdominal pain    On today's exam, I suspect that her pain is really the digit dense breast tissue in the upper outer quadrant of the left breast   However, since this is a new complaint, I will obtain a left diagnostic mammogram and left breast ultrasound for further evaluation  Assuming the imaging is negative, I have recommended evening Primrose oil, wearing a supportive bra and dietary modifications  I have instructed the patient to call if her pain does not improve or if she appreciates any changes on self-exam   I will order a bilateral 3D diagnostic mammogram for July 2019 and we will plan to see the patient back in 6 months or sooner if the need arises  She will call if she wishes to proceed with further genetic testing or if she has any other concerns or symptoms  All of her questions were answered  History of Present Illness:        Ductal carcinoma in situ (DCIS) of right breast    8/25/2014 Initial Diagnosis     Ductal carcinoma in situ (DCIS) of right breast         9/23/2014 Surgery     Right lumpectomy (Dr Shanique Duffy)          - 12/16/2014 Radiation     WBRT (Dr Jud Rushing, [place], [place])         2015 Genetic Testing     BRCA1, BRCA2 negative             -Interval History:  Patient presents today for six-month follow-up visit for right breast cancer diagnosed in 2014  She complaints today of left breast pain x2 weeks  Review of Systems:  Review of Systems   Constitutional: Negative for activity change, appetite change, chills, fatigue, fever and unexpected weight change  HENT: Negative for trouble swallowing  Eyes: Negative for pain, redness and visual disturbance  Respiratory: Negative for cough, shortness of breath and wheezing  Cardiovascular: Negative for chest pain, palpitations and leg swelling  Gastrointestinal: Negative for abdominal pain, constipation, diarrhea, nausea and vomiting  Endocrine: Negative for cold intolerance and heat intolerance  Musculoskeletal: Negative for arthralgias, back pain, gait problem and myalgias     Skin: Negative for color change and rash    Neurological: Negative for dizziness, syncope, light-headedness, numbness and headaches  Hematological: Negative for adenopathy  Psychiatric/Behavioral: Negative for agitation and confusion  All other systems reviewed and are negative  Patient Active Problem List   Diagnosis    Ductal carcinoma in situ (DCIS) of right breast    Chronic fatigue    Dermatitis    GERD (gastroesophageal reflux disease)    Greater trochanteric bursitis of right hip    Pain, pelvic, female    Seasonal allergies     Past Medical History:   Diagnosis Date    Benign paroxysmal vertigo      Past Surgical History:   Procedure Laterality Date    BREAST LUMPECTOMY W/ NEEDLE LOCALIZATION Right 09/23/2014    Dr Ashanti Godoy / [facility]       Family History   Problem Relation Age of Onset    Pancreatic cancer Maternal Grandfather     Lung cancer Paternal Grandmother      Social History     Socioeconomic History    Marital status: /Civil Union     Spouse name: Not on file    Number of children: Not on file    Years of education: Not on file    Highest education level: Not on file   Occupational History    Not on file   Social Needs    Financial resource strain: Not on file    Food insecurity:     Worry: Not on file     Inability: Not on file    Transportation needs:     Medical: Not on file     Non-medical: Not on file   Tobacco Use    Smoking status: Never Smoker    Smokeless tobacco: Never Used   Substance and Sexual Activity    Alcohol use:  Yes    Drug use: No    Sexual activity: Yes     Partners: Male     Birth control/protection: None   Lifestyle    Physical activity:     Days per week: Not on file     Minutes per session: Not on file    Stress: Not on file   Relationships    Social connections:     Talks on phone: Not on file     Gets together: Not on file     Attends Gnosticism service: Not on file     Active member of club or organization: Not on file     Attends meetings of clubs or organizations: Not on file     Relationship status: Not on file    Intimate partner violence:     Fear of current or ex partner: Not on file     Emotionally abused: Not on file     Physically abused: Not on file     Forced sexual activity: Not on file   Other Topics Concern    Not on file   Social History Narrative    Not on file       Current Outpatient Medications:     fluticasone (FLONASE) 50 mcg/act nasal spray, into each nostril Daily, Disp: , Rfl:     Multiple Vitamin (DAILY VALUE MULTIVITAMIN PO), Take 1 tablet by mouth daily, Disp: , Rfl:   Allergies   Allergen Reactions    Sulfa Antibiotics Rash     Vitals:    02/26/19 1501   BP: 108/60   Pulse: 72   Resp: 18   Temp: 97 7 °F (36 5 °C)       Physical Exam   Constitutional: She is oriented to person, place, and time  Vital signs are normal  She appears well-developed and well-nourished  No distress  HENT:   Head: Normocephalic and atraumatic  Neck: Normal range of motion  Cardiovascular: Normal rate, regular rhythm and normal heart sounds  Pulmonary/Chest: Effort normal and breath sounds normal    Bilateral breasts were examined in the sitting and supine position  Right breast surgical scar present  Dense breast tissue noted in upper outer quadrant of left breast- no dominant masses  There are no bilateral dominant masses, skin nodules or skin changes, nipple changes or nipple discharge  There is no bilateral supraclavicular or axillary lymphadenopathy noted  Abdominal: Soft  Normal appearance  She exhibits no mass  There is no hepatosplenomegaly  There is no tenderness  Musculoskeletal: Normal range of motion  Lymphadenopathy:     She has no axillary adenopathy  Right: No supraclavicular adenopathy present  Left: No supraclavicular adenopathy present  Neurological: She is alert and oriented to person, place, and time  Skin: Skin is warm, dry and intact  No rash noted  She is not diaphoretic     Psychiatric: She has a normal mood and affect  Her speech is normal    Vitals reviewed  Advance Care Planning/Advance Directives:  Discussed disease status, cancer treatment plans and/or cancer treatment goals with the patient

## 2019-02-26 NOTE — PATIENT INSTRUCTIONS
- Wear a supportive bra  Sports bras work well  - Decrease salty food intake  - Decrease caffeine intake  - Evening Primrose Oil - 1 mg capsule three times/day  Take for 1-2 months     -If breast pain is not relieved, you can stop taking

## 2019-02-28 ENCOUNTER — HOSPITAL ENCOUNTER (OUTPATIENT)
Dept: MAMMOGRAPHY | Facility: CLINIC | Age: 49
Discharge: HOME | End: 2019-02-28
Payer: COMMERCIAL

## 2019-02-28 ENCOUNTER — HOSPITAL ENCOUNTER (OUTPATIENT)
Dept: ULTRASOUND IMAGING | Facility: CLINIC | Age: 49
Discharge: HOME | End: 2019-02-28
Payer: COMMERCIAL

## 2019-02-28 VITALS — HEIGHT: 63 IN | BODY MASS INDEX: 27.29 KG/M2 | WEIGHT: 154 LBS

## 2019-02-28 DIAGNOSIS — N64.4 PAIN OF LEFT BREAST: ICD-10-CM

## 2019-02-28 PROCEDURE — 77065 DX MAMMO INCL CAD UNI: CPT

## 2019-02-28 PROCEDURE — 76642 ULTRASOUND BREAST LIMITED: CPT

## 2019-02-28 PROCEDURE — G0279 TOMOSYNTHESIS, MAMMO: HCPCS

## 2019-07-02 ENCOUNTER — APPOINTMENT (OUTPATIENT)
Dept: RADIOLOGY | Facility: MEDICAL CENTER | Age: 49
End: 2019-07-02
Payer: COMMERCIAL

## 2019-07-02 ENCOUNTER — OFFICE VISIT (OUTPATIENT)
Dept: FAMILY MEDICINE CLINIC | Facility: CLINIC | Age: 49
End: 2019-07-02
Payer: COMMERCIAL

## 2019-07-02 VITALS
BODY MASS INDEX: 27.75 KG/M2 | DIASTOLIC BLOOD PRESSURE: 60 MMHG | SYSTOLIC BLOOD PRESSURE: 104 MMHG | WEIGHT: 156.6 LBS | HEIGHT: 63 IN

## 2019-07-02 DIAGNOSIS — Z13.220 ENCOUNTER FOR SCREENING FOR LIPID DISORDER: ICD-10-CM

## 2019-07-02 DIAGNOSIS — G89.29 CHRONIC PAIN OF RIGHT THUMB: Primary | ICD-10-CM

## 2019-07-02 DIAGNOSIS — Z13.228 SCREENING FOR METABOLIC DISORDER: ICD-10-CM

## 2019-07-02 DIAGNOSIS — M79.644 CHRONIC PAIN OF RIGHT THUMB: ICD-10-CM

## 2019-07-02 DIAGNOSIS — M25.512 ACUTE PAIN OF LEFT SHOULDER: ICD-10-CM

## 2019-07-02 DIAGNOSIS — G89.29 CHRONIC PAIN OF RIGHT THUMB: ICD-10-CM

## 2019-07-02 DIAGNOSIS — M79.644 CHRONIC PAIN OF RIGHT THUMB: Primary | ICD-10-CM

## 2019-07-02 PROCEDURE — 3008F BODY MASS INDEX DOCD: CPT | Performed by: FAMILY MEDICINE

## 2019-07-02 PROCEDURE — 73130 X-RAY EXAM OF HAND: CPT

## 2019-07-02 PROCEDURE — 99213 OFFICE O/P EST LOW 20 MIN: CPT | Performed by: FAMILY MEDICINE

## 2019-07-02 NOTE — PROGRESS NOTES
Assessment/Plan: We will order an x-ray of the right thumb and if no fractures are noted MRI of the right thumb also start hand therapy and also make an appointment with hand orthopedics which can be canceled if the condition improves with regards to the left shoulder will start ultrasound treatments    Problem List Items Addressed This Visit     None      Visit Diagnoses     Chronic pain of right thumb    -  Primary    Acute pain of left shoulder               Diagnoses and all orders for this visit:    Chronic pain of right thumb    Acute pain of left shoulder        No problem-specific Assessment & Plan notes found for this encounter  Subjective:      Patient ID: Catrina Singh is a 54 y o  female  Clearnold Dillon is here today with 2 orthopedic problems she banged her right thumb off of a counter top and for has had literally months of pain at the base of the 1st metacarpal phalangeal joint on and she is now starting to have issues with  strength and removing caps and bottle and jar tops and her  strength is weak 2nd issue is pain in the trapezius muscle of the left shoulder this was an injury she received when she was pushing a [employer] rec cut [employer] act on top of a car care ear and that is about 2 weeks induration      The following portions of the patient's history were reviewed and updated as appropriate:   She has a past medical history of Benign paroxysmal vertigo  ,  does not have any pertinent problems on file  ,   has a past surgical history that includes Breast lumpectomy w/ needle localization (Right, 09/23/2014) and Colposcopy w/ biopsy / curettage  ,  family history includes Lung cancer in her paternal grandmother; Pancreatic cancer in her maternal grandfather  ,   reports that she has never smoked  She has never used smokeless tobacco  She reports that she drinks alcohol  She reports that she does not use drugs  ,  is allergic to sulfa antibiotics     Current Outpatient Medications   Medication Sig Dispense Refill    fluticasone (FLONASE) 50 mcg/act nasal spray into each nostril Daily      Multiple Vitamin (DAILY VALUE MULTIVITAMIN PO) Take 1 tablet by mouth daily       No current facility-administered medications for this visit  Review of Systems   Constitutional: Positive for activity change  Negative for appetite change, diaphoresis, fatigue and fever  HENT: Negative  Eyes: Negative  Respiratory: Negative for apnea, cough, chest tightness, shortness of breath and wheezing  Cardiovascular: Negative for chest pain, palpitations and leg swelling  Gastrointestinal: Negative for abdominal distention, abdominal pain, anal bleeding, constipation, diarrhea, nausea and vomiting  Endocrine: Negative for cold intolerance, heat intolerance, polydipsia, polyphagia and polyuria  Genitourinary: Negative for difficulty urinating, dysuria, flank pain, hematuria and urgency  Musculoskeletal: Positive for arthralgias  Negative for back pain, gait problem, joint swelling and myalgias  Right thumb pain and left shoulder pain   Skin: Negative for color change, rash and wound  Allergic/Immunologic: Negative for environmental allergies, food allergies and immunocompromised state  Neurological: Negative for dizziness, seizures, syncope, speech difficulty, numbness and headaches  Hematological: Negative for adenopathy  Does not bruise/bleed easily  Psychiatric/Behavioral: Negative for agitation, behavioral problems, hallucinations, sleep disturbance and suicidal ideas  Objective:  Vitals:    07/02/19 0701   BP: 104/60   BP Location: Left arm   Patient Position: Sitting   Cuff Size: Standard   Weight: 71 kg (156 lb 9 6 oz)   Height: 5' 3" (1 6 m)     Body mass index is 27 74 kg/m²  Physical Exam   Constitutional: She is oriented to person, place, and time  She appears well-developed and well-nourished  No distress  HENT:   Head: Normocephalic     Right Ear: External ear normal  Left Ear: External ear normal    Nose: Nose normal    Mouth/Throat: Oropharynx is clear and moist    Eyes: Pupils are equal, round, and reactive to light  Conjunctivae and EOM are normal  Right eye exhibits no discharge  Left eye exhibits no discharge  No scleral icterus  Neck: Normal range of motion  No tracheal deviation present  No thyromegaly present  Cardiovascular: Normal rate, regular rhythm and normal heart sounds  Exam reveals no gallop and no friction rub  No murmur heard  Pulmonary/Chest: Effort normal and breath sounds normal  No respiratory distress  She has no wheezes  Abdominal: Soft  Bowel sounds are normal  She exhibits no mass  There is no tenderness  There is no guarding  Musculoskeletal: She exhibits tenderness  She exhibits no edema or deformity  Lymphadenopathy:     She has no cervical adenopathy  Neurological: She is alert and oriented to person, place, and time  No cranial nerve deficit  Skin: Skin is warm and dry  No rash noted  She is not diaphoretic  No erythema  Psychiatric: She has a normal mood and affect   Thought content normal

## 2019-07-05 DIAGNOSIS — G89.29 CHRONIC PAIN OF RIGHT THUMB: Primary | ICD-10-CM

## 2019-07-05 DIAGNOSIS — M79.644 CHRONIC PAIN OF RIGHT THUMB: Primary | ICD-10-CM

## 2019-07-05 NOTE — RESULT ENCOUNTER NOTE
Please call the patient regarding her abnormal result    Mild arthritis of the base of the thumb refer patient to hand orthopedics such as Dr Ulysses Maltese 2 low because she may need an injection of steroids into the base of the thumb

## 2019-07-08 ENCOUNTER — OFFICE VISIT (OUTPATIENT)
Dept: OBGYN CLINIC | Facility: CLINIC | Age: 49
End: 2019-07-08
Payer: COMMERCIAL

## 2019-07-08 VITALS
DIASTOLIC BLOOD PRESSURE: 71 MMHG | WEIGHT: 155.6 LBS | BODY MASS INDEX: 27.57 KG/M2 | HEIGHT: 63 IN | SYSTOLIC BLOOD PRESSURE: 110 MMHG | HEART RATE: 67 BPM

## 2019-07-08 DIAGNOSIS — M25.80 SESAMOIDITIS: Primary | ICD-10-CM

## 2019-07-08 DIAGNOSIS — G89.29 CHRONIC PAIN OF RIGHT THUMB: ICD-10-CM

## 2019-07-08 DIAGNOSIS — M79.644 CHRONIC PAIN OF RIGHT THUMB: ICD-10-CM

## 2019-07-08 PROCEDURE — 99203 OFFICE O/P NEW LOW 30 MIN: CPT | Performed by: ORTHOPAEDIC SURGERY

## 2019-07-08 NOTE — PROGRESS NOTES
ASSESSMENT/PLAN:    Assessment:   Right thumb sesamoiditis    Plan:   Diagnostics reviewed and physical exam performed  Diagnosis, treatment options and associated risks were discussed with the patient including no treatment, nonsurgical treatment and potential for surgical intervention  The patient was given the opportunity to ask questions regarding each  No need for surgical intervention  Offered a cortisone injection, patient declined, and thumb brace  Activities as tolerated    Follow Up:  PRN    To Do Next Visit:  Advised to call    General Discussions:  Informed of bone contusion and how long it can take to heal     Operative Discussions:  None indicated    _____________________________________________________  CHIEF COMPLAINT:  Chief Complaint   Patient presents with    Right Thumb - Pain         SUBJECTIVE:  Refugio Kendrick is a 54 y o  RHD female who presents with thumb pain for the past 3 months after she bumped her thumb on her desk at work  Denies any numbness or tingling  Denies any bruising or significant swelling  Denies any erythema  Denies any nighttime  Denies any locking or catching  She tried a brace initially for a week  She has increased pain with gripping activities and opening jar lids        PAST MEDICAL HISTORY:  Past Medical History:   Diagnosis Date    Benign paroxysmal vertigo     Fracture of phalanx of toe 07/21/2015    Last assessed    Tendinitis 04/13/2015    Last assessed       PAST SURGICAL HISTORY:  Past Surgical History:   Procedure Laterality Date    BREAST LUMPECTOMY W/ NEEDLE LOCALIZATION Right 09/23/2014    Dr Tony Goodman / [facility]         FAMILY HISTORY:  Family History   Problem Relation Age of Onset    Pancreatic cancer Maternal Grandfather     Lung cancer Paternal Grandmother     No Known Problems Mother     Heart disease Father     Diabetes Son     Breast cancer Family     Diabetes Family        SOCIAL HISTORY:  Social History     Tobacco Use    Smoking status: Never Smoker    Smokeless tobacco: Never Used   Substance Use Topics    Alcohol use: Yes    Drug use: No       MEDICATIONS:    Current Outpatient Medications:     fluticasone (FLONASE) 50 mcg/act nasal spray, into each nostril Daily, Disp: , Rfl:     Multiple Vitamin (DAILY VALUE MULTIVITAMIN PO), Take 1 tablet by mouth daily, Disp: , Rfl:     ALLERGIES:  Allergies   Allergen Reactions    Sulfa Antibiotics Rash       REVIEW OF SYSTEMS:  Pertinent items are noted in HPI  A comprehensive review of systems was negative  LABS:  HgA1c:   Lab Results   Component Value Date    HGBA1C 5 3 06/29/2018     BMP:   Lab Results   Component Value Date    GLUCOSE 92 12/31/2014    CALCIUM 9 0 12/31/2014     12/31/2014    K 4 5 12/31/2014    CO2 28 2 12/31/2014     12/31/2014    BUN 11 12/31/2014    CREATININE 0 78 12/31/2014         _____________________________________________________  PHYSICAL EXAMINATION:  Vital signs: /71   Pulse 67   Ht 5' 3" (1 6 m)   Wt 70 6 kg (155 lb 9 6 oz)   BMI 27 56 kg/m²   General: well developed and well nourished, alert, oriented times 3 and appears comfortable  Psychiatric: Normal  HEENT: Trachea Midline, No torticollis  Cardiovascular: No discernable arrhythmia  Pulmonary: No wheezing or stridor  Skin: No masses, erthema, lacerations, fluctation, ulcerations  Neurovascular: Sensation Intact to the Median, Ulnar, Radial Nerve, Motor Intact to the Median, Ulnar, Radial Nerve and Pulses Intact    MUSCULOSKELETAL EXAMINATION:  RIGHT SIDE:  CMC: Negative Grind, No tenderness to CMC, Negative Shoulder Sign, No Instability and + sesamoid stress test, tenderness radial sesamoidm nontender ulnar sesamoid, no UCL laxity, no volar plate or dorsal capsule laxity   flexor and extensor tendons intact     _____________________________________________________  STUDIES REVIEWED:  The attending physician has personally reviewed the pertinent films in PACS and interpretation is as follows:  Right hand x-rays taken 07/02/2019 were reviewed today and show:  No acute fracture dislocation, mild degenerative changes primarily in the 1st MCP joint with a cyst proximal phalanx          PROCEDURES PERFORMED:  Procedures  No Procedures performed today   Scribe Attestation    I,:   Aileen Powell am acting as a scribe while in the presence of the attending physician :        I,:   Fidel Dobson MD personally performed the services described in this documentation    as scribed in my presence :

## 2019-07-29 ENCOUNTER — HOSPITAL ENCOUNTER (OUTPATIENT)
Dept: MAMMOGRAPHY | Facility: CLINIC | Age: 49
Discharge: HOME | End: 2019-07-29
Payer: COMMERCIAL

## 2019-07-29 VITALS — WEIGHT: 155 LBS | HEIGHT: 63 IN | BODY MASS INDEX: 27.46 KG/M2

## 2019-07-29 DIAGNOSIS — Z85.3 HISTORY OF RIGHT BREAST CANCER: ICD-10-CM

## 2019-07-29 PROCEDURE — G0279 TOMOSYNTHESIS, MAMMO: HCPCS

## 2019-07-29 PROCEDURE — 77066 DX MAMMO INCL CAD BI: CPT

## 2019-09-10 ENCOUNTER — OFFICE VISIT (OUTPATIENT)
Dept: SURGICAL ONCOLOGY | Facility: CLINIC | Age: 49
End: 2019-09-10
Payer: COMMERCIAL

## 2019-09-10 VITALS
DIASTOLIC BLOOD PRESSURE: 70 MMHG | WEIGHT: 159 LBS | HEART RATE: 77 BPM | BODY MASS INDEX: 28.17 KG/M2 | HEIGHT: 63 IN | TEMPERATURE: 97.9 F | SYSTOLIC BLOOD PRESSURE: 120 MMHG | RESPIRATION RATE: 16 BRPM

## 2019-09-10 DIAGNOSIS — Z08 ENCOUNTER FOR FOLLOW-UP EXAMINATION AFTER COMPLETED TREATMENT FOR MALIGNANT NEOPLASM: Primary | ICD-10-CM

## 2019-09-10 DIAGNOSIS — Z12.31 VISIT FOR SCREENING MAMMOGRAM: ICD-10-CM

## 2019-09-10 DIAGNOSIS — Z85.3 HISTORY OF RIGHT BREAST CANCER: ICD-10-CM

## 2019-09-10 PROCEDURE — 99213 OFFICE O/P EST LOW 20 MIN: CPT | Performed by: NURSE PRACTITIONER

## 2019-09-10 NOTE — PROGRESS NOTES
Surgical Oncology Follow Up       [address] [facility]  [facility]ER [facility] [place]  [address] [address]  [facility] [address]    Lokesh Conception  1970  [phone]  [address] [facility]  CANCER [facility] [place]  [address] [id number]    Chief Complaint   Patient presents with    Breast Cancer     Pt is here for 6 month f/u       Assessment/Plan:  1  Encounter for follow-up examination after completed treatment for malignant neoplasm    2  History of right breast cancer  - 1 year f/u visit    3  Visit for screening mammogram  - Mammo screening bilateral w 3d & cad; Future      Discussion/Summary: Patient is a 54year old female that presents today for a six-month follow-up for right breast cancer diagnosed in September 2014  Her pathology revealed DCIS, %, %  At that time she underwent a right lumpectomy and completed whole breast radiation therapy  She had a bilateral diagnostic mammogram performed on July 29, 2019 which as BIRADS 2, category 2 density  She has no new complaints today and there are no concerns on today's exam   She is now 5 years from her diagnosis  Therefore, I will order a bilateral 3D screening mammogram for July of 2020 and we will plan to see the patient back in 1 year or sooner if the need arises  She was instructed to call with any new concerns or symptoms prior to that time  History of Present Illness:        History of right breast cancer    8/25/2014 Initial Diagnosis     Ductal carcinoma in situ (DCIS) of right breast      9/23/2014 Surgery     Right lumpectomy (Dr Jarod Rachel)       - 12/16/2014 Radiation     [place]      2015 Genetic Testing     BRCA1, BRCA2 negative          -Interval History:  Patient presents today for a six-month follow-up visit for right IH breast cancer diagnosed in August 2014   She had a bilateral diagnostic mammogram performed on July 29, 2019 which was BI-RADS 2, category 2 density  Her breast pain has resolved  She notices no changes on self-breast exam   Denies headaches, back pain, bone pain, cough, shortness of breath, abdominal pain  Review of Systems:  Review of Systems   Constitutional: Negative for activity change, appetite change, chills, fatigue, fever and unexpected weight change  HENT: Negative for trouble swallowing  Eyes: Negative for pain, redness and visual disturbance  Respiratory: Negative for cough, shortness of breath and wheezing  Cardiovascular: Negative for chest pain, palpitations and leg swelling  Gastrointestinal: Negative for abdominal pain, constipation, diarrhea, nausea and vomiting  Endocrine: Negative for cold intolerance and heat intolerance  Musculoskeletal: Negative for arthralgias, back pain, gait problem and myalgias  Skin: Negative for color change and rash  Neurological: Negative for dizziness, syncope, light-headedness, numbness and headaches  Hematological: Negative for adenopathy  Psychiatric/Behavioral: Negative for agitation and confusion  All other systems reviewed and are negative        Patient Active Problem List   Diagnosis    History of right breast cancer    Chronic fatigue    Dermatitis    GERD (gastroesophageal reflux disease)    Greater trochanteric bursitis of right hip    Pain, pelvic, female    Seasonal allergies    Encounter for follow-up examination after completed treatment for malignant neoplasm    Pain of left breast    Family history of cancer     Past Medical History:   Diagnosis Date    Benign paroxysmal vertigo     BRCA1 negative     BRCA2 negative     Breast cancer ([facility] [address] ) 2014    rt breast    Fracture of phalanx of toe 07/21/2015    Last assessed    History of radiation exposure     Tendinitis 04/13/2015    Last assessed     Past Surgical History:   Procedure Laterality Date    BREAST LUMPECTOMY W/ NEEDLE LOCALIZATION Right 09/23/2014    Dr Medina Ruiz W/ [facility] / [facility]       Family History   Problem Relation Age of Onset    Pancreatic cancer Maternal Grandfather     Lung cancer Paternal Grandmother     No Known Problems Mother     Heart disease Father     Diabetes Son     Breast cancer Family     Diabetes Family     No Known Problems Maternal Aunt     No Known Problems Maternal Aunt     No Known Problems Maternal Aunt     No Known Problems Paternal Aunt      Social History     Socioeconomic History    Marital status: /Civil Union     Spouse name: Not on file    Number of children: Not on file    Years of education: Not on file    Highest education level: Not on file   Occupational History    Not on file   Social Needs    Financial resource strain: Not on file    Food insecurity:     Worry: Not on file     Inability: Not on file    Transportation needs:     Medical: Not on file     Non-medical: Not on file   Tobacco Use    Smoking status: Never Smoker    Smokeless tobacco: Never Used   Substance and Sexual Activity    Alcohol use:  Yes    Drug use: No    Sexual activity: Yes     Partners: Male     Birth control/protection: None   Lifestyle    Physical activity:     Days per week: Not on file     Minutes per session: Not on file    Stress: Not on file   Relationships    Social connections:     Talks on phone: Not on file     Gets together: Not on file     Attends Orthodoxy service: Not on file     Active member of club or organization: Not on file     Attends meetings of clubs or organizations: Not on file     Relationship status: Not on file    Intimate partner violence:     Fear of current or ex partner: Not on file     Emotionally abused: Not on file     Physically abused: Not on file     Forced sexual activity: Not on file   Other Topics Concern    Not on file   Social History Narrative    Not on file       Current Outpatient Medications:     fluticasone (FLONASE) 50 mcg/act nasal spray, into each nostril Daily, Disp: , Rfl:     Multiple Vitamin (DAILY VALUE MULTIVITAMIN PO), Take 1 tablet by mouth daily, Disp: , Rfl:   Allergies   Allergen Reactions    Sulfa Antibiotics Rash     Vitals:    09/10/19 1520   BP: 120/70   Pulse: 77   Resp: 16   Temp: 97 9 °F (36 6 °C)       Physical Exam   Constitutional: She is oriented to person, place, and time  Vital signs are normal  She appears well-developed and well-nourished  No distress  HENT:   Head: Normocephalic and atraumatic  Neck: Normal range of motion  Cardiovascular: Normal rate, regular rhythm and normal heart sounds  Pulmonary/Chest: Effort normal and breath sounds normal    Bilateral breasts were examined in the sitting and supine position  Right breast surgical scar present  There are no bilateral dominant masses, skin nodules or skin changes, nipple changes or nipple discharge  There is no bilateral supraclavicular or axillary lymphadenopathy noted  Abdominal: Soft  Normal appearance  She exhibits no mass  There is no hepatosplenomegaly  There is no tenderness  Musculoskeletal: Normal range of motion  Lymphadenopathy:     She has no axillary adenopathy  Right: No supraclavicular adenopathy present  Left: No supraclavicular adenopathy present  Neurological: She is alert and oriented to person, place, and time  Skin: Skin is warm, dry and intact  No rash noted  She is not diaphoretic  Psychiatric: She has a normal mood and affect  Her speech is normal    Vitals reviewed  Advance Care Planning/Advance Directives:  Discussed disease status, cancer treatment plans and/or cancer treatment goals with the patient

## 2019-09-16 DIAGNOSIS — J30.1 SEASONAL ALLERGIC RHINITIS DUE TO POLLEN: Primary | ICD-10-CM

## 2019-09-16 RX ORDER — FLUTICASONE PROPIONATE 50 MCG
SPRAY, SUSPENSION (ML) NASAL
Qty: 48 G | Refills: 0 | Status: SHIPPED | OUTPATIENT
Start: 2019-09-16

## 2019-10-21 ENCOUNTER — ANNUAL EXAM (OUTPATIENT)
Dept: OBGYN CLINIC | Facility: CLINIC | Age: 49
End: 2019-10-21
Payer: COMMERCIAL

## 2019-10-21 VITALS — SYSTOLIC BLOOD PRESSURE: 110 MMHG | BODY MASS INDEX: 28.22 KG/M2 | WEIGHT: 159.3 LBS | DIASTOLIC BLOOD PRESSURE: 58 MMHG

## 2019-10-21 DIAGNOSIS — Z01.419 ENCOUNTER FOR WELL WOMAN EXAM WITH ROUTINE GYNECOLOGICAL EXAM: Primary | ICD-10-CM

## 2019-10-21 PROCEDURE — G0145 SCR C/V CYTO,THINLAYER,RESCR: HCPCS | Performed by: OBSTETRICS & GYNECOLOGY

## 2019-10-21 PROCEDURE — 99396 PREV VISIT EST AGE 40-64: CPT | Performed by: OBSTETRICS & GYNECOLOGY

## 2019-10-21 NOTE — PATIENT INSTRUCTIONS
Thank you for your confidence in our team    We appreciate you and welcome your feedback  If you receive a survey from us, please take a few moments to let us know how we are doing     Sincerely,   Rafa Fernandez MD

## 2019-10-21 NOTE — PROGRESS NOTES
ASSESSMENT & PLAN: Meredith Gabriel is a 54 y o  [id number] with normal gynecologic exam     1   Routine well woman exam done today  2  Pap and HPV:  The patient's last pap and hpv was   It was normal     Pap and cotesting was done today  Current ASCCP Guidelines reviewed  Requesting annual paps  3  Mammogram ordered  4  The following were reviewed in today's visit: breast self exam  5  Sees Dr Leah Mclean - DCIS    CC:  Annual Gynecologic Examination    HPI: Meredith Gabriel is a 54 y o  [id number]  who presents for annual gynecologic examination  She has the following concerns:  none    Health Maintenance:    She wears her seatbelt routinely  She does perform regular monthly self breast exams  She feels safe at home  Patient Active Problem List   Diagnosis    History of right breast cancer    Chronic fatigue    Dermatitis    GERD (gastroesophageal reflux disease)    Greater trochanteric bursitis of right hip    Pain, pelvic, female    Seasonal allergies    Encounter for follow-up examination after completed treatment for malignant neoplasm    Pain of left breast    Family history of cancer       Past Medical History:   Diagnosis Date    Benign paroxysmal vertigo     BRCA1 negative     BRCA2 negative     Breast cancer ([facility] [address] )     rt breast    Fracture of phalanx of toe 2015    Last assessed    History of radiation exposure     Tendinitis 2015    Last assessed       Past Surgical History:   Procedure Laterality Date    BREAST LUMPECTOMY W/ NEEDLE LOCALIZATION Right 2014    Dr Travis Medeiros / Yonatan Masterson         Past OB/Gyn History:  OB History        3    Para   3    Term   3            AB        Living   3       SAB        TAB        Ectopic        Multiple        Live Births   3                  Pt does not have menstrual issues      History of sexually transmitted infection: No   History of abnormal pap smears: No      Patient is currently sexually active  heterosexual  The current method of family planning is none      Family History   Problem Relation Age of Onset    Pancreatic cancer Maternal Grandfather     Lung cancer Paternal Grandmother     No Known Problems Mother     Heart disease Father     Diabetes Son     Breast cancer Family     Diabetes Family     No Known Problems Maternal Aunt     No Known Problems Maternal Aunt     No Known Problems Maternal Aunt     No Known Problems Paternal Aunt        Social History:  Social History     Socioeconomic History    Marital status: /Civil Union     Spouse name: Not on file    Number of children: Not on file    Years of education: Not on file    Highest education level: Not on file   Occupational History    Not on file   Social Needs    Financial resource strain: Not on file    Food insecurity:     Worry: Not on file     Inability: Not on file    Transportation needs:     Medical: Not on file     Non-medical: Not on file   Tobacco Use    Smoking status: Never Smoker    Smokeless tobacco: Never Used   Substance and Sexual Activity    Alcohol use: Yes     Frequency: 2-3 times a week    Drug use: No    Sexual activity: Yes     Partners: Male     Birth control/protection: None   Lifestyle    Physical activity:     Days per week: Not on file     Minutes per session: Not on file    Stress: Not on file   Relationships    Social connections:     Talks on phone: Not on file     Gets together: Not on file     Attends [employer] service: Not on file     Active member of club or organization: Not on file     Attends meetings of clubs or organizations: Not on file     Relationship status: Not on file    Intimate partner violence:     Fear of current or ex partner: Not on file     Emotionally abused: Not on file     Physically abused: Not on file     Forced sexual activity: Not on file   Other Topics Concern    Not on file   Social History Narrative    Not on file Allergies   Allergen Reactions    Sulfa Antibiotics Rash       Current Outpatient Medications:     fluticasone (FLONASE) 50 mcg/act nasal spray, into each nostril Daily, Disp: , Rfl:     fluticasone (FLONASE) 50 mcg/act nasal spray, INHALE 2 SPRAYS VIA EACH NOSTRIL ONCE DAILY, Disp: 48 g, Rfl: 0    Multiple Vitamin (DAILY VALUE MULTIVITAMIN PO), Take 1 tablet by mouth daily, Disp: , Rfl:     Review of Systems:    Review of Systems  Constitutional :no fever, feels well, no tiredness, no recent weight gain or loss  ENT: no ear ache, no loss of hearing, no nosebleeds or nasal discharge, no sore throat or hoarseness  Cardiovascular: no complaints of slow or fast heart beat, no chest pain, no palpitations, no leg claudication or lower extremity edema  Respiratory: no complaints of shortness of shortness of breath, no JEFF  Breasts:no complaints of breast pain, breast lump, or nipple discharge  Gastrointestinal: no complaints of abdominal pain, constipation, nausea, vomiting, or diarrhea or bloody stools  Genitourinary : no complaints of dysuria, incontinence, pelvic pain, no dysmenorrhea, vaginal discharge or abnormal vaginal bleeding and as noted in HPI  Musculoskeletal: no complaints of arthralgia, no myalgia, no joint swelling or stiffness, no limb pain or swelling    Integumentary: no complaints of skin rash or lesion, itching or dry skin  Neurological: no complaints of headache, no confusion, no numbness or tingling, no dizziness or fainting    Objective      /58   Wt 72 3 kg (159 lb 4 8 oz)   BMI 28 22 kg/m²     General:   appears stated age, cooperative, alert normal mood and affect   Neck: normal, supple,trachea midline, no masses   Heart: regular rate and rhythm, S1, S2 normal, no murmur, click, rub or gallop   Lungs: clear to auscultation bilaterally   Breasts: normal appearance, no masses or tenderness, Inspection negative, No nipple retraction or dimpling, No nipple discharge or bleeding, No axillary or supraclavicular adenopathy, Normal to palpation without dominant masses, scar from prior biopsy noted right breast   Abdomen: soft, non-tender, without masses or organomegaly   Vulva: normal, normal female genitalia, Bartholin's, Urethra, [place] normal, no lesions, normal female hair distribution   Vagina: normal vagina, no discharge, exudate, lesion, or erythema   Urethra: normal   Cervix: Normal, no discharge  PAP done  Uterus: normal size, contour, position, consistency, mobility, non-tender   Adnexa: normal adnexa   Lymphatic palpation of lymph nodes in neck, axilla, groin and/or other locations: no lymphadenopathy or masses noted   Skin normal skin turgor and no rashes     Psychiatric orientation to person, place, and time: normal  mood and affect: normal

## 2019-10-25 LAB
HPV HR 12 DNA CVX QL NAA+PROBE: ABNORMAL
HPV16 DNA CVX QL NAA+PROBE: ABNORMAL
HPV18 DNA CVX QL NAA+PROBE: ABNORMAL
LAB AP GYN PRIMARY INTERPRETATION: NORMAL
Lab: NORMAL

## 2020-07-30 ENCOUNTER — HOSPITAL ENCOUNTER (OUTPATIENT)
Dept: MAMMOGRAPHY | Facility: CLINIC | Age: 50
Discharge: HOME | End: 2020-07-30
Payer: COMMERCIAL

## 2020-07-30 VITALS — BODY MASS INDEX: 27.46 KG/M2 | TEMPERATURE: 97 F | HEIGHT: 63 IN | WEIGHT: 155 LBS

## 2020-07-30 DIAGNOSIS — Z12.31 VISIT FOR SCREENING MAMMOGRAM: ICD-10-CM

## 2020-07-30 PROCEDURE — 77067 SCR MAMMO BI INCL CAD: CPT

## 2020-07-30 PROCEDURE — 77063 BREAST TOMOSYNTHESIS BI: CPT

## 2020-09-10 ENCOUNTER — OFFICE VISIT (OUTPATIENT)
Dept: SURGICAL ONCOLOGY | Facility: CLINIC | Age: 50
End: 2020-09-10
Payer: COMMERCIAL

## 2020-09-10 VITALS
HEIGHT: 63 IN | SYSTOLIC BLOOD PRESSURE: 104 MMHG | DIASTOLIC BLOOD PRESSURE: 78 MMHG | TEMPERATURE: 98.1 F | HEART RATE: 98 BPM | WEIGHT: 152 LBS | BODY MASS INDEX: 26.93 KG/M2

## 2020-09-10 DIAGNOSIS — Z08 ENCOUNTER FOR FOLLOW-UP EXAMINATION AFTER COMPLETED TREATMENT FOR MALIGNANT NEOPLASM: Primary | ICD-10-CM

## 2020-09-10 DIAGNOSIS — Z12.31 VISIT FOR SCREENING MAMMOGRAM: ICD-10-CM

## 2020-09-10 DIAGNOSIS — Z85.3 HISTORY OF RIGHT BREAST CANCER: ICD-10-CM

## 2020-09-10 PROCEDURE — 99213 OFFICE O/P EST LOW 20 MIN: CPT | Performed by: NURSE PRACTITIONER

## 2020-09-10 NOTE — PROGRESS NOTES
Surgical Oncology Follow Up       [address] [facility]  [facility] [place]  [address]  [facility] [address]    Don Gould  1970  [id number]  [address] [facility],[address] [place] [address]  [facility] [place]  [address] [address]    Chief Complaint   Patient presents with    Follow-up       Assessment/Plan:  1  Encounter for follow-up examination after completed treatment for malignant neoplasm    2  History of right breast cancer  - 1 year f/u visit    3  Visit for screening mammogram  - Mammo screening bilateral w 3d & cad; Future    Discussion/Summary: Patient is a 54 year old female that presents today for a one year follow-up for right breast cancer diagnosed in September 2014  Her pathology revealed DCIS, %, %  She underwent a right lumpectomy and completed whole breast radiation therapy  She underwent genetic testing for BRCA 1 and 2 which was negative  She is declining further genetic testing  She had a bilateral 3D screening mammogram on July 30, 2020 which was BI-RADS 2, category 2 density  She has no new complaints today and there are no concerns on today's exam   I will order a mammogram for next year and see her back at that time or sooner should the need arise  She was instructed to call with any new concerns or symptoms prior to that time  All of her questions were answered today  History of Present Illness:     Oncology History   History of right breast cancer   8/25/2014 Initial Diagnosis    Ductal carcinoma in situ (DCIS) of right breast     9/23/2014 Surgery    Right lumpectomy (Dr Goran Kebede)      - 12/16/2014 Radiation    WBRT (Dr Muriel Berry, [place], [place])     2015 Genetic Testing    BRCA1, BRCA2 negative          -Interval History:  Patient presents today for follow-up visit for right breast cancer diagnosed in 2014   She notices no changes on her self exam   Denies headaches, back pain, bone pain, cough, shortness of breath, abdominal pain  She had a bilateral mammogram on July 30, 2020 which was BI-RADS 2, category 2 density  Review of Systems:  Review of Systems   Constitutional: Negative for activity change, appetite change, chills, fatigue, fever and unexpected weight change  HENT: Negative for trouble swallowing  Eyes: Negative for pain, redness and visual disturbance  Respiratory: Negative for cough, shortness of breath and wheezing  Cardiovascular: Negative for chest pain, palpitations and leg swelling  Gastrointestinal: Negative for abdominal pain, constipation, diarrhea, nausea and vomiting  Endocrine: Negative for cold intolerance and heat intolerance  Musculoskeletal: Negative for arthralgias, back pain, gait problem and myalgias  Skin: Negative for color change and rash  Neurological: Negative for dizziness, syncope, light-headedness, numbness and headaches  Hematological: Negative for adenopathy  Psychiatric/Behavioral: Negative for agitation and confusion  All other systems reviewed and are negative        Patient Active Problem List   Diagnosis    History of right breast cancer    Chronic fatigue    Dermatitis    GERD (gastroesophageal reflux disease)    Greater trochanteric bursitis of right hip    Pain, pelvic, female    Seasonal allergies    Encounter for follow-up examination after completed treatment for malignant neoplasm    Pain of left breast    Family history of cancer     Past Medical History:   Diagnosis Date    Benign paroxysmal vertigo     BRCA1 negative     BRCA2 negative     Breast cancer ([facility] [address] ) 2014    rt breast    Fracture of phalanx of toe 07/21/2015    Last assessed    History of radiation exposure     Tendinitis 04/13/2015    Last assessed     Past Surgical History:   Procedure Laterality Date    BREAST LUMPECTOMY W/ NEEDLE LOCALIZATION Right 09/23/2014    Dr Rufus Moncada / [facility]       Family History   Problem Relation Age of Onset    Pancreatic cancer Maternal Grandfather     Lung cancer Paternal Grandmother     No Known Problems Mother     Heart disease Father     Diabetes Son     Breast cancer Family     Diabetes Family     No Known Problems Maternal Aunt     No Known Problems Maternal Aunt     No Known Problems Maternal Aunt     No Known Problems Paternal Aunt      Social History     Socioeconomic History    Marital status: /Civil Union     Spouse name: Not on file    Number of children: Not on file    Years of education: Not on file    Highest education level: Not on file   Occupational History    Not on file   Social Needs    Financial resource strain: Not on file    Food insecurity     Worry: Not on file     Inability: Not on file   [employer] needs     Medical: Not on file     Non-medical: Not on file   Tobacco Use    Smoking status: Never Smoker    Smokeless tobacco: Never Used   Substance and Sexual Activity    Alcohol use: Yes     Frequency: 2-3 times a week    Drug use: No    Sexual activity: Yes     Partners: Male     Birth control/protection: None   Lifestyle    Physical activity     Days per week: Not on file     Minutes per session: Not on file    Stress: Not on file   Relationships    Social connections     Talks on phone: Not on file     Gets together: Not on file     Attends Mandaen service: Not on file     Active member of club or organization: Not on file     Attends meetings of clubs or organizations: Not on file     Relationship status: Not on file    Intimate partner violence     Fear of current or ex partner: Not on file     Emotionally abused: Not on file     Physically abused: Not on file     Forced sexual activity: Not on file   Other Topics Concern    Not on file   Social History Narrative    Not on file       Current Outpatient Medications:     fluticasone (FLONASE) 50 mcg/act nasal spray, into each nostril Daily, Disp: , Rfl:     fluticasone (FLONASE) 50 mcg/act nasal spray, INHALE 2 SPRAYS VIA EACH NOSTRIL ONCE DAILY, Disp: 48 g, Rfl: 0    Multiple Vitamin (DAILY VALUE MULTIVITAMIN PO), Take 1 tablet by mouth daily, Disp: , Rfl:   Allergies   Allergen Reactions    Sulfa Antibiotics Rash     Vitals:    09/10/20 1401   BP: 104/78   Pulse: 98   Temp: 98 1 °F (36 7 °C)       Physical Exam  Vitals signs reviewed  Constitutional:       General: She is not in acute distress  Appearance: Normal appearance  She is well-developed  She is not diaphoretic  HENT:      Head: Normocephalic and atraumatic  Neck:      Musculoskeletal: Normal range of motion  Cardiovascular:      Rate and Rhythm: Normal rate and regular rhythm  Heart sounds: Normal heart sounds  Pulmonary:      Effort: Pulmonary effort is normal       Breath sounds: Normal breath sounds  Chest:      Breasts:         Right: Skin change (Surgical scar) present  No swelling, bleeding, inverted nipple, mass, nipple discharge or tenderness  Left: No swelling, bleeding, inverted nipple, mass, nipple discharge, skin change or tenderness  Abdominal:      Palpations: Abdomen is soft  There is no mass  Tenderness: There is no abdominal tenderness  Musculoskeletal: Normal range of motion  Lymphadenopathy:      Upper Body:      Right upper body: No supraclavicular or axillary adenopathy  Left upper body: No supraclavicular or axillary adenopathy  Skin:     General: Skin is warm and dry  Findings: No rash  Neurological:      Mental Status: She is alert and oriented to person, place, and time  Psychiatric:         Speech: Speech normal          Advance Care Planning/Advance Directives:  Discussed disease status, cancer treatment plans and/or cancer treatment goals with the patient

## 2020-11-16 ENCOUNTER — ANNUAL EXAM (OUTPATIENT)
Dept: OBGYN CLINIC | Facility: CLINIC | Age: 50
End: 2020-11-16
Payer: COMMERCIAL

## 2020-11-16 VITALS
TEMPERATURE: 99 F | BODY MASS INDEX: 29.23 KG/M2 | SYSTOLIC BLOOD PRESSURE: 116 MMHG | HEIGHT: 63 IN | WEIGHT: 165 LBS | DIASTOLIC BLOOD PRESSURE: 70 MMHG

## 2020-11-16 DIAGNOSIS — Z85.3 HISTORY OF RIGHT BREAST CANCER: ICD-10-CM

## 2020-11-16 DIAGNOSIS — Z01.419 WELL WOMAN EXAM WITH ROUTINE GYNECOLOGICAL EXAM: Primary | ICD-10-CM

## 2020-11-16 PROCEDURE — G0145 SCR C/V CYTO,THINLAYER,RESCR: HCPCS | Performed by: OBSTETRICS & GYNECOLOGY

## 2020-11-16 PROCEDURE — 99396 PREV VISIT EST AGE 40-64: CPT | Performed by: OBSTETRICS & GYNECOLOGY

## 2020-11-21 LAB
HPV HR 12 DNA CVX QL NAA+PROBE: ABNORMAL
HPV16 DNA CVX QL NAA+PROBE: ABNORMAL
HPV18 DNA CVX QL NAA+PROBE: ABNORMAL

## 2020-11-23 LAB
LAB AP GYN PRIMARY INTERPRETATION: NORMAL
Lab: NORMAL

## 2020-12-28 ENCOUNTER — OFFICE VISIT (OUTPATIENT)
Dept: OBGYN CLINIC | Facility: MEDICAL CENTER | Age: 50
End: 2020-12-28

## 2020-12-28 DIAGNOSIS — Z12.4 SCREENING FOR CERVICAL CANCER: ICD-10-CM

## 2020-12-28 DIAGNOSIS — R87.615 ENCOUNTER FOR REPEAT PAP SMEAR DUE TO PREVIOUS INSUFFICIENT CERVICAL CELLS: Primary | ICD-10-CM

## 2020-12-28 PROCEDURE — 99024 POSTOP FOLLOW-UP VISIT: CPT | Performed by: ADVANCED PRACTICE MIDWIFE

## 2020-12-28 PROCEDURE — 87624 HPV HI-RISK TYP POOLED RSLT: CPT | Performed by: ADVANCED PRACTICE MIDWIFE

## 2021-01-01 LAB
HPV HR 12 DNA CVX QL NAA+PROBE: NEGATIVE
HPV16 DNA CVX QL NAA+PROBE: NEGATIVE
HPV18 DNA CVX QL NAA+PROBE: NEGATIVE

## 2021-03-17 ENCOUNTER — OFFICE VISIT (OUTPATIENT)
Dept: FAMILY MEDICINE CLINIC | Facility: CLINIC | Age: 51
End: 2021-03-17
Payer: COMMERCIAL

## 2021-03-17 ENCOUNTER — APPOINTMENT (OUTPATIENT)
Dept: RADIOLOGY | Facility: MEDICAL CENTER | Age: 51
End: 2021-03-17
Payer: COMMERCIAL

## 2021-03-17 VITALS
DIASTOLIC BLOOD PRESSURE: 86 MMHG | SYSTOLIC BLOOD PRESSURE: 132 MMHG | OXYGEN SATURATION: 97 % | BODY MASS INDEX: 30.05 KG/M2 | WEIGHT: 169.6 LBS | HEIGHT: 63 IN | HEART RATE: 89 BPM | TEMPERATURE: 98.1 F

## 2021-03-17 DIAGNOSIS — M15.1 HEBERDEN'S NODE: ICD-10-CM

## 2021-03-17 DIAGNOSIS — M19.041 ARTHRITIS OF RIGHT HAND: Primary | ICD-10-CM

## 2021-03-17 DIAGNOSIS — M19.041 ARTHRITIS OF RIGHT HAND: ICD-10-CM

## 2021-03-17 DIAGNOSIS — M20.22 HALLUX RIGIDUS OF LEFT FOOT: ICD-10-CM

## 2021-03-17 DIAGNOSIS — Z13.31 DEPRESSION SCREENING NEGATIVE: ICD-10-CM

## 2021-03-17 PROCEDURE — 73130 X-RAY EXAM OF HAND: CPT

## 2021-03-17 PROCEDURE — 73630 X-RAY EXAM OF FOOT: CPT

## 2021-03-17 PROCEDURE — 99213 OFFICE O/P EST LOW 20 MIN: CPT | Performed by: PHYSICIAN ASSISTANT

## 2021-03-17 NOTE — PROGRESS NOTES
Assessment/Plan:    Problem List Items Addressed This Visit        Musculoskeletal and Integument    Heberden's node       Other    Hallux rigidus of left foot    Relevant Orders    Ambulatory referral to Podiatry    XR foot 3+ vw left      Other Visit Diagnoses     Arthritis of right hand    -  Primary    Relevant Orders    XR hand 3+ vw right    Depression screening negative        BMI 30 0-30 9,adult               Diagnoses and all orders for this visit:    Arthritis of right hand  -     XR hand 3+ vw right; Future    Heberden's node    Hallux rigidus of left foot  -     Ambulatory referral to Podiatry; Future  -     XR foot 3+ vw left; Future    Depression screening negative    BMI 30 0-30 9,adult        For R hand OA, check imaging  Try Aleve with food once daily x 5 days then give break to see how it feels  For L foot hallux rigidus, will refer to podiatry to see if surgical correction is needed  Subjective:      Patient ID: Melissa Johnson is a 54 y o  female  [facility] is here today complaining of pain in her R hand  She's developed a "bump" on her distal R index finger consistent with Heberden's node  R hand is minimally swollen, no signs of infection  Pain worse in the morning, seems to improve slightly as the day goes on  Also, complaining of painful lump on the top of her L foot at her great toe  At times, feels like the joint needs to crack but is very painful  Has a history of trauma a few years ago when kicked a chock while wearing flip flops, no history of fracture in the foot  The following portions of the patient's history were reviewed and updated as appropriate:   She has a past medical history of Benign paroxysmal vertigo, BRCA1 negative, BRCA2 negative, Breast cancer ([facility] [address] ) (2014), Fracture of phalanx of toe (07/21/2015), History of radiation exposure, and Tendinitis (04/13/2015)  ,  does not have any pertinent problems on file  ,   has a past surgical history that includes Breast lumpectomy w/ needle localization (Right, 09/23/2014); Colposcopy w/ biopsy / curettage; and Endometrial ablation  ,  family history includes Breast cancer in her family; Diabetes in her family and son; Heart disease in her father; Lung cancer in her paternal grandmother; No Known Problems in her maternal aunt, maternal aunt, maternal aunt, mother, and paternal aunt; Pancreatic cancer in her maternal grandfather  ,   reports that she has never smoked  She has never used smokeless tobacco  She reports current alcohol use  She reports that she does not use drugs  ,  is allergic to sulfa antibiotics     Current Outpatient Medications   Medication Sig Dispense Refill    fluticasone (FLONASE) 50 mcg/act nasal spray INHALE 2 SPRAYS VIA EACH NOSTRIL ONCE DAILY 48 g 0    Multiple Vitamin (DAILY VALUE MULTIVITAMIN PO) Take 1 tablet by mouth daily      fluticasone (FLONASE) 50 mcg/act nasal spray into each nostril Daily       No current facility-administered medications for this visit  Review of Systems   Constitutional: Negative for activity change, appetite change, chills, diaphoresis, fatigue, fever and unexpected weight change  HENT: Negative for congestion, ear pain, postnasal drip, rhinorrhea, sinus pressure, sinus pain, sneezing, sore throat, tinnitus and voice change  Eyes: Negative for pain, redness and visual disturbance  Respiratory: Negative for cough, chest tightness, shortness of breath and wheezing  Cardiovascular: Negative for chest pain, palpitations and leg swelling  Gastrointestinal: Negative for abdominal pain, blood in stool, constipation, diarrhea, nausea and vomiting  Genitourinary: Negative for difficulty urinating, dysuria, frequency, hematuria and urgency  Musculoskeletal: Positive for arthralgias  Negative for back pain, gait problem, joint swelling, myalgias, neck pain and neck stiffness  Skin: Negative for color change, pallor, rash and wound     Neurological: Negative for dizziness, tremors, weakness, light-headedness and headaches  Psychiatric/Behavioral: Negative for dysphoric mood, self-injury, sleep disturbance and suicidal ideas  The patient is not nervous/anxious  Objective:  Vitals:    03/17/21 1510   BP: 132/86   Pulse: 89   Temp: 98 1 °F (36 7 °C)   SpO2: 97%   Weight: 76 9 kg (169 lb 9 6 oz)   Height: 5' 3" (1 6 m)     Body mass index is 30 04 kg/m²  Physical Exam  Vitals signs reviewed  Constitutional:       General: She is not in acute distress  Appearance: She is well-developed  She is not diaphoretic  HENT:      Head: Normocephalic and atraumatic  Right Ear: Hearing, tympanic membrane, ear canal and external ear normal       Left Ear: Hearing, tympanic membrane, ear canal and external ear normal       Mouth/Throat:      Pharynx: Uvula midline  No oropharyngeal exudate  Eyes:      General: No scleral icterus  Right eye: No discharge  Left eye: No discharge  Conjunctiva/sclera: Conjunctivae normal    Neck:      Musculoskeletal: Neck supple  Thyroid: No thyromegaly  Vascular: No carotid bruit  Cardiovascular:      Rate and Rhythm: Normal rate and regular rhythm  Heart sounds: Normal heart sounds  No murmur  Pulmonary:      Effort: Pulmonary effort is normal  No respiratory distress  Breath sounds: Normal breath sounds  No wheezing  Abdominal:      General: Bowel sounds are normal  There is no distension  Palpations: Abdomen is soft  There is no mass  Tenderness: There is no abdominal tenderness  There is no guarding or rebound  Musculoskeletal:      Right hand: She exhibits tenderness and bony tenderness  She exhibits normal range of motion  Left foot: Decreased range of motion  Deformity and prominent metatarsal heads present  No bunion  Feet:       Comments: Heberden's node R index finger   Lymphadenopathy:      Cervical: No cervical adenopathy     Skin: General: Skin is warm and dry  Findings: No erythema or rash  Neurological:      Mental Status: She is alert and oriented to person, place, and time  Psychiatric:         Behavior: Behavior normal          Thought Content: Thought content normal          Judgment: Judgment normal          PHQ-9 Depression Screening    PHQ-9:   Frequency of the following problems over the past two weeks:      Little interest or pleasure in doing things: 0 - not at all  Feeling down, depressed, or hopeless: 0 - not at all  PHQ-2 Score: 0           BMI Counseling: Body mass index is 30 04 kg/m²  The BMI is above normal  Nutrition recommendations include reducing portion sizes, decreasing overall calorie intake, 3-5 servings of fruits/vegetables daily, reducing fast food intake, consuming healthier snacks, decreasing soda and/or juice intake, moderation in carbohydrate intake, increasing intake of lean protein, reducing intake of saturated fat and trans fat and reducing intake of cholesterol  Exercise recommendations include exercising 3-5 times per week

## 2021-03-18 ENCOUNTER — TELEPHONE (OUTPATIENT)
Dept: PODIATRY | Facility: CLINIC | Age: 51
End: 2021-03-18

## 2021-03-18 NOTE — TELEPHONE ENCOUNTER
I received a call from Dr Tamiko Garcia office asking me to call Maria Teresa Paniagua and schedule her for a new patient appointment with Dr Jovanna De La Rosa for Reymundo Antunezs  I called Maria Teresa Paniagua and left a message asking her to call for the appointment

## 2021-04-06 ENCOUNTER — OFFICE VISIT (OUTPATIENT)
Dept: PODIATRY | Facility: CLINIC | Age: 51
End: 2021-04-06
Payer: COMMERCIAL

## 2021-04-06 VITALS
DIASTOLIC BLOOD PRESSURE: 75 MMHG | HEART RATE: 81 BPM | SYSTOLIC BLOOD PRESSURE: 111 MMHG | WEIGHT: 169 LBS | BODY MASS INDEX: 29.95 KG/M2 | HEIGHT: 63 IN

## 2021-04-06 DIAGNOSIS — M79.672 LEFT FOOT PAIN: ICD-10-CM

## 2021-04-06 DIAGNOSIS — M20.22 HALLUX RIGIDUS OF LEFT FOOT: Primary | ICD-10-CM

## 2021-04-06 PROCEDURE — 99243 OFF/OP CNSLTJ NEW/EST LOW 30: CPT | Performed by: PODIATRIST

## 2021-04-06 RX ORDER — MELOXICAM 15 MG/1
15 TABLET ORAL DAILY
Qty: 30 TABLET | Refills: 0 | Status: SHIPPED | OUTPATIENT
Start: 2021-04-06 | End: 2021-06-22 | Stop reason: SDUPTHER

## 2021-04-06 RX ORDER — CEFAZOLIN SODIUM 1 G/50ML
1000 SOLUTION INTRAVENOUS ONCE
Status: CANCELLED | OUTPATIENT
Start: 2021-06-25 | End: 2021-04-06

## 2021-04-06 NOTE — LETTER
April 7, 2021     Ronnie Melton, 72 [address]    Patient: Tony Rabago   YOB: 1970   Date of Visit: 4/6/2021       Dear Dr Coni Irene: Thank you for referring Tony Rabago to me for evaluation  Below are my notes for this consultation  If you have questions, please do not hesitate to call me  I look forward to following your patient along with you  Sincerely,        Nini Alves DPM        CC: No Recipients  [facility]  4/6/2021  4:45 PM  Signed  Assessment/Plan:      I personally reviewed x-rays of the left foot dated 03/17/2021  They reveal a narrowed joint space at the 1st metatarsophalangeal joint  A dorsal exostosis is present  Explained the patient that she is dealing with a deformity known as hallux rigidus of the left foot which is an arthritic joint  Discussed treatment options  In the short term, patient will be placed on meloxicam 15 mg daily  She will discontinue Aleve  Patient is interested in surgical intervention  Recommended implant arthroplasty  This will be performed at [facility] on June 25, 2021  Patient will be rescheduled for consent signing  No problem-specific Assessment & Plan notes found for this encounter  Diagnoses and all orders for this visit:    Hallux rigidus of left foot  -     meloxicam (MOBIC) 15 mg tablet; Take 1 tablet (15 mg total) by mouth daily    Left foot pain  -     meloxicam (MOBIC) 15 mg tablet; Take 1 tablet (15 mg total) by mouth daily          Subjective:      Patient ID: Tony Rabago is a 54 y o  female  HPI       Patient, a 53-year-old female in apparent good health presents with pain in her left great toe joint  Patient has pain with motion in this joint and there is also a red area on the top of the joint that is painful with shoe pressure    On questioning, patient notes that she injured the left foot 5 years ago and has been in pain ever since  She has mild pain in the right great toe joint but does not have near the pain in motion in this joint as she does in the left  Patient has pain in her fingers  She was recently told to try Aleve  The following portions of the patient's history were reviewed and updated as appropriate: allergies, current medications, past family history, past medical history, past social history, past surgical history and problem list     Review of Systems   Respiratory: Negative  Cardiovascular: Negative  Gastrointestinal: Negative  Musculoskeletal: Positive for gait problem  Psychiatric/Behavioral: Negative  Objective:      /75   Pulse 81   Ht 5' 3" (1 6 m)   Wt 76 7 kg (169 lb)   BMI 29 94 kg/m²          Physical Exam  Constitutional:       Appearance: Normal appearance  Musculoskeletal:         General: Tenderness and deformity present  Comments: Sharp pain elicited with range of motion left great toe joint  Motion is restricted to 30° of dorsiflexion  Erythema present on the dorsum of the left 1st metatarsal head  Normal range of motion right 1st MPJ  Skin:     General: Skin is warm  Neurological:      General: No focal deficit present  Mental Status: She is oriented to person, place, and time

## 2021-04-06 NOTE — PROGRESS NOTES
Assessment/Plan:      I personally reviewed x-rays of the left foot dated 03/17/2021  They reveal a narrowed joint space at the 1st metatarsophalangeal joint  A dorsal exostosis is present  Explained the patient that she is dealing with a deformity known as hallux rigidus of the left foot which is an arthritic joint  Discussed treatment options  In the short term, patient will be placed on meloxicam 15 mg daily  She will discontinue Aleve  Patient is interested in surgical intervention  Recommended implant arthroplasty  This will be performed at [facility] on June 25, 2021  Patient will be rescheduled for consent signing  No problem-specific Assessment & Plan notes found for this encounter  Diagnoses and all orders for this visit:    Hallux rigidus of left foot  -     meloxicam (MOBIC) 15 mg tablet; Take 1 tablet (15 mg total) by mouth daily    Left foot pain  -     meloxicam (MOBIC) 15 mg tablet; Take 1 tablet (15 mg total) by mouth daily          Subjective:      Patient ID: Shaq Auguste is a 54 y o  female  HPI       Patient, a 72-year-old female in apparent good health presents with pain in her left great toe joint  Patient has pain with motion in this joint and there is also a red area on the top of the joint that is painful with shoe pressure  On questioning, patient notes that she injured the left foot 5 years ago and has been in pain ever since  She has mild pain in the right great toe joint but does not have near the pain in motion in this joint as she does in the left  Patient has pain in her fingers  She was recently told to try Aleve  The following portions of the patient's history were reviewed and updated as appropriate: allergies, current medications, past family history, past medical history, past social history, past surgical history and problem list     Review of Systems   Respiratory: Negative  Cardiovascular: Negative  Gastrointestinal: Negative  Musculoskeletal: Positive for gait problem  Psychiatric/Behavioral: Negative  Objective:      /75   Pulse 81   Ht 5' 3" (1 6 m)   Wt 76 7 kg (169 lb)   BMI 29 94 kg/m²          Physical Exam  Constitutional:       Appearance: Normal appearance  Musculoskeletal:         General: Tenderness and deformity present  Comments: Sharp pain elicited with range of motion left great toe joint  Motion is restricted to 30° of dorsiflexion  Erythema present on the dorsum of the left 1st metatarsal head  Normal range of motion right 1st MPJ  Skin:     General: Skin is warm  Neurological:      General: No focal deficit present  Mental Status: She is oriented to person, place, and time

## 2021-05-13 DIAGNOSIS — M20.22 HALLUX RIGIDUS OF LEFT FOOT: Primary | ICD-10-CM

## 2021-05-13 RX ORDER — MELOXICAM 15 MG/1
15 TABLET ORAL DAILY
Qty: 90 TABLET | Refills: 0 | Status: SHIPPED | OUTPATIENT
Start: 2021-05-13 | End: 2022-08-08

## 2021-05-17 ENCOUNTER — TRANSCRIBE ORDERS (OUTPATIENT)
Dept: ADMINISTRATIVE | Facility: HOSPITAL | Age: 51
End: 2021-05-17

## 2021-05-17 ENCOUNTER — APPOINTMENT (OUTPATIENT)
Dept: LAB | Facility: HOSPITAL | Age: 51
End: 2021-05-17

## 2021-05-17 DIAGNOSIS — Z00.8 HEALTH EXAMINATION IN POPULATION SURVEY: Primary | ICD-10-CM

## 2021-05-17 DIAGNOSIS — Z00.8 HEALTH EXAMINATION IN POPULATION SURVEY: ICD-10-CM

## 2021-05-17 LAB
CHOLEST SERPL-MCNC: 210 MG/DL (ref 50–200)
EST. AVERAGE GLUCOSE BLD GHB EST-MCNC: 100 MG/DL
HBA1C MFR BLD: 5.1 %
HDLC SERPL-MCNC: 55 MG/DL
LDLC SERPL CALC-MCNC: 120 MG/DL (ref 0–100)
NONHDLC SERPL-MCNC: 155 MG/DL
TRIGL SERPL-MCNC: 174 MG/DL

## 2021-05-17 PROCEDURE — 83036 HEMOGLOBIN GLYCOSYLATED A1C: CPT

## 2021-05-17 PROCEDURE — 80061 LIPID PANEL: CPT

## 2021-05-17 PROCEDURE — 36415 COLL VENOUS BLD VENIPUNCTURE: CPT

## 2021-06-08 ENCOUNTER — OFFICE VISIT (OUTPATIENT)
Dept: PODIATRY | Facility: CLINIC | Age: 51
End: 2021-06-08
Payer: COMMERCIAL

## 2021-06-08 VITALS
HEIGHT: 63 IN | HEART RATE: 80 BPM | WEIGHT: 167.8 LBS | BODY MASS INDEX: 29.73 KG/M2 | DIASTOLIC BLOOD PRESSURE: 76 MMHG | SYSTOLIC BLOOD PRESSURE: 119 MMHG

## 2021-06-08 DIAGNOSIS — M20.22 HALLUX RIGIDUS OF LEFT FOOT: Primary | ICD-10-CM

## 2021-06-08 PROCEDURE — 99213 OFFICE O/P EST LOW 20 MIN: CPT | Performed by: PODIATRIST

## 2021-06-08 NOTE — PROGRESS NOTES
Assessment/Plan:    Procedure, implant arthroplasty left foot was explained to patient including pre and postoperative course, risks and possible complications  Consent form was signed  Percocet will be utilized for pain  Patient may continue to take meloxicam     No problem-specific Assessment & Plan notes found for this encounter  There are no diagnoses linked to this encounter  Subjective:      Patient ID: Dalila Victoria is a 54 y o  female  HPI       Patient presents for consent signing  She is scheduled for an implant arthroplasty of the left foot on June 25, 2021 at [facility]  Patient continues to have pain when walking due to arthritis in the left great toe joint  I personally reviewed x-rays of the left foot dated 03/17/2021  They reveal degenerative changes in the 1st metatarsophalangeal joint  The following portions of the patient's history were reviewed and updated as appropriate: allergies, current medications, past family history, past medical history, past social history, past surgical history and problem list     Review of Systems   Cardiovascular: Negative  Gastrointestinal: Negative  Musculoskeletal: Positive for arthralgias  Psychiatric/Behavioral: Negative  Objective:      /76   Pulse 80   Ht 5' 3" (1 6 m)   Wt 76 1 kg (167 lb 12 8 oz)   BMI 29 72 kg/m²          Physical Exam  Constitutional:       Appearance: Normal appearance  Cardiovascular:      Pulses: Normal pulses  Musculoskeletal:         General: Tenderness and deformity present  Comments: Pain with range of motion left 1st MPJ  Skin:     General: Skin is warm

## 2021-06-22 RX ORDER — LORATADINE 10 MG/1
10 TABLET ORAL DAILY
COMMUNITY

## 2021-06-22 NOTE — PRE-PROCEDURE INSTRUCTIONS
Pre-Surgery Instructions:   Medication Instructions    fluticasone (FLONASE) 50 mcg/act nasal spray- ok to use as prescribed Instructed patient per Anesthesia Guidelines   loratadine (CLARITIN) 10 mg tablet- ok to take DOP with a sip of water Instructed patient per Anesthesia Guidelines   meloxicam (MOBIC) 15 mg tablet- hold 3 days prior Instructed patient per Anesthesia Guidelines   Multiple Vitamin (DAILY VALUE MULTIVITAMIN PO)- hold until after procedure Instructed patient per Anesthesia Guidelines         Pt verbalizes understanding of med instructions, NPO after MN day before & bathing instructions (has CHG)

## 2021-06-24 ENCOUNTER — ANESTHESIA EVENT (OUTPATIENT)
Dept: PERIOP | Facility: HOSPITAL | Age: 51
End: 2021-06-24
Payer: COMMERCIAL

## 2021-06-25 ENCOUNTER — ANESTHESIA (OUTPATIENT)
Dept: PERIOP | Facility: HOSPITAL | Age: 51
End: 2021-06-25
Payer: COMMERCIAL

## 2021-06-25 ENCOUNTER — HOSPITAL ENCOUNTER (OUTPATIENT)
Facility: HOSPITAL | Age: 51
Setting detail: OUTPATIENT SURGERY
Discharge: HOME | End: 2021-06-25
Attending: PODIATRIST | Admitting: PODIATRIST
Payer: COMMERCIAL

## 2021-06-25 ENCOUNTER — APPOINTMENT (OUTPATIENT)
Dept: RADIOLOGY | Facility: HOSPITAL | Age: 51
End: 2021-06-25
Payer: COMMERCIAL

## 2021-06-25 VITALS
BODY MASS INDEX: 29.59 KG/M2 | TEMPERATURE: 97.1 F | WEIGHT: 167 LBS | DIASTOLIC BLOOD PRESSURE: 64 MMHG | RESPIRATION RATE: 16 BRPM | OXYGEN SATURATION: 97 % | HEART RATE: 80 BPM | HEIGHT: 63 IN | SYSTOLIC BLOOD PRESSURE: 110 MMHG

## 2021-06-25 DIAGNOSIS — Z98.890 POST-OPERATIVE STATE: Primary | ICD-10-CM

## 2021-06-25 LAB
EXT PREGNANCY TEST URINE: NEGATIVE
EXT. CONTROL: NORMAL

## 2021-06-25 PROCEDURE — 28291 CORRJ HALUX RIGDUS W/IMPLT: CPT | Performed by: PODIATRIST

## 2021-06-25 PROCEDURE — 73630 X-RAY EXAM OF FOOT: CPT

## 2021-06-25 PROCEDURE — 81025 URINE PREGNANCY TEST: CPT | Performed by: PODIATRIST

## 2021-06-25 PROCEDURE — 99024 POSTOP FOLLOW-UP VISIT: CPT | Performed by: PODIATRIST

## 2021-06-25 PROCEDURE — C1776 JOINT DEVICE (IMPLANTABLE): HCPCS | Performed by: PODIATRIST

## 2021-06-25 DEVICE — IMPLANTABLE DEVICE
Type: IMPLANTABLE DEVICE | Site: FOOT | Status: FUNCTIONAL
Brand: SWANSON

## 2021-06-25 RX ORDER — ONDANSETRON 2 MG/ML
INJECTION INTRAMUSCULAR; INTRAVENOUS AS NEEDED
Status: DISCONTINUED | OUTPATIENT
Start: 2021-06-25 | End: 2021-06-25

## 2021-06-25 RX ORDER — CEFAZOLIN SODIUM 1 G/50ML
1000 SOLUTION INTRAVENOUS ONCE
Status: DISCONTINUED | OUTPATIENT
Start: 2021-06-25 | End: 2021-06-25

## 2021-06-25 RX ORDER — OXYCODONE HYDROCHLORIDE AND ACETAMINOPHEN 5; 325 MG/1; MG/1
1 TABLET ORAL EVERY 4 HOURS PRN
Qty: 24 TABLET | Refills: 0 | Status: SHIPPED | OUTPATIENT
Start: 2021-06-25 | End: 2021-07-05

## 2021-06-25 RX ORDER — HYDROMORPHONE HCL/PF 1 MG/ML
SYRINGE (ML) INJECTION AS NEEDED
Status: DISCONTINUED | OUTPATIENT
Start: 2021-06-25 | End: 2021-06-25

## 2021-06-25 RX ORDER — OXYCODONE HYDROCHLORIDE 5 MG/1
5 TABLET ORAL EVERY 4 HOURS PRN
Status: DISCONTINUED | OUTPATIENT
Start: 2021-06-25 | End: 2021-06-25 | Stop reason: HOSPADM

## 2021-06-25 RX ORDER — PROPOFOL 10 MG/ML
INJECTION, EMULSION INTRAVENOUS AS NEEDED
Status: DISCONTINUED | OUTPATIENT
Start: 2021-06-25 | End: 2021-06-25

## 2021-06-25 RX ORDER — SODIUM CHLORIDE 9 MG/ML
125 INJECTION, SOLUTION INTRAVENOUS CONTINUOUS
Status: DISCONTINUED | OUTPATIENT
Start: 2021-06-25 | End: 2021-06-25 | Stop reason: HOSPADM

## 2021-06-25 RX ORDER — FENTANYL CITRATE 50 UG/ML
INJECTION, SOLUTION INTRAMUSCULAR; INTRAVENOUS AS NEEDED
Status: DISCONTINUED | OUTPATIENT
Start: 2021-06-25 | End: 2021-06-25

## 2021-06-25 RX ORDER — CEFAZOLIN SODIUM 1 G/50ML
SOLUTION INTRAVENOUS AS NEEDED
Status: DISCONTINUED | OUTPATIENT
Start: 2021-06-25 | End: 2021-06-25

## 2021-06-25 RX ORDER — LIDOCAINE HYDROCHLORIDE 20 MG/ML
INJECTION, SOLUTION EPIDURAL; INFILTRATION; INTRACAUDAL; PERINEURAL AS NEEDED
Status: DISCONTINUED | OUTPATIENT
Start: 2021-06-25 | End: 2021-06-25

## 2021-06-25 RX ORDER — ONDANSETRON 2 MG/ML
4 INJECTION INTRAMUSCULAR; INTRAVENOUS ONCE AS NEEDED
Status: DISCONTINUED | OUTPATIENT
Start: 2021-06-25 | End: 2021-06-25 | Stop reason: HOSPADM

## 2021-06-25 RX ORDER — EPHEDRINE SULFATE 50 MG/ML
INJECTION INTRAVENOUS AS NEEDED
Status: DISCONTINUED | OUTPATIENT
Start: 2021-06-25 | End: 2021-06-25

## 2021-06-25 RX ORDER — MIDAZOLAM HYDROCHLORIDE 2 MG/2ML
INJECTION, SOLUTION INTRAMUSCULAR; INTRAVENOUS AS NEEDED
Status: DISCONTINUED | OUTPATIENT
Start: 2021-06-25 | End: 2021-06-25

## 2021-06-25 RX ORDER — DEXAMETHASONE SODIUM PHOSPHATE 4 MG/ML
INJECTION, SOLUTION INTRA-ARTICULAR; INTRALESIONAL; INTRAMUSCULAR; INTRAVENOUS; SOFT TISSUE AS NEEDED
Status: DISCONTINUED | OUTPATIENT
Start: 2021-06-25 | End: 2021-06-25

## 2021-06-25 RX ORDER — BUPIVACAINE HYDROCHLORIDE 5 MG/ML
INJECTION, SOLUTION EPIDURAL; INTRACAUDAL AS NEEDED
Status: DISCONTINUED | OUTPATIENT
Start: 2021-06-25 | End: 2021-06-25 | Stop reason: HOSPADM

## 2021-06-25 RX ORDER — FENTANYL CITRATE/PF 50 MCG/ML
25 SYRINGE (ML) INJECTION
Status: DISCONTINUED | OUTPATIENT
Start: 2021-06-25 | End: 2021-06-25 | Stop reason: HOSPADM

## 2021-06-25 RX ORDER — SODIUM CHLORIDE, SODIUM LACTATE, POTASSIUM CHLORIDE, CALCIUM CHLORIDE 600; 310; 30; 20 MG/100ML; MG/100ML; MG/100ML; MG/100ML
INJECTION, SOLUTION INTRAVENOUS CONTINUOUS PRN
Status: DISCONTINUED | OUTPATIENT
Start: 2021-06-25 | End: 2021-06-25

## 2021-06-25 RX ORDER — MAGNESIUM HYDROXIDE 1200 MG/15ML
LIQUID ORAL AS NEEDED
Status: DISCONTINUED | OUTPATIENT
Start: 2021-06-25 | End: 2021-06-25 | Stop reason: HOSPADM

## 2021-06-25 RX ADMIN — SODIUM CHLORIDE, SODIUM LACTATE, POTASSIUM CHLORIDE, AND CALCIUM CHLORIDE: .6; .31; .03; .02 INJECTION, SOLUTION INTRAVENOUS at 07:53

## 2021-06-25 RX ADMIN — EPHEDRINE SULFATE 10 MG: 50 INJECTION, SOLUTION INTRAVENOUS at 07:37

## 2021-06-25 RX ADMIN — FENTANYL CITRATE 100 MCG: 50 INJECTION INTRAMUSCULAR; INTRAVENOUS at 07:27

## 2021-06-25 RX ADMIN — LIDOCAINE HYDROCHLORIDE 100 MG: 20 INJECTION, SOLUTION EPIDURAL; INFILTRATION; INTRACAUDAL; PERINEURAL at 07:25

## 2021-06-25 RX ADMIN — ONDANSETRON 4 MG: 2 INJECTION INTRAMUSCULAR; INTRAVENOUS at 07:32

## 2021-06-25 RX ADMIN — SODIUM CHLORIDE 125 ML/HR: 0.9 INJECTION, SOLUTION INTRAVENOUS at 09:01

## 2021-06-25 RX ADMIN — SODIUM CHLORIDE 125 ML/HR: 0.9 INJECTION, SOLUTION INTRAVENOUS at 06:00

## 2021-06-25 RX ADMIN — HYDROMORPHONE HYDROCHLORIDE 0.5 MG: 1 INJECTION, SOLUTION INTRAMUSCULAR; INTRAVENOUS; SUBCUTANEOUS at 08:35

## 2021-06-25 RX ADMIN — CEFAZOLIN SODIUM 1000 MG: 1 SOLUTION INTRAVENOUS at 07:10

## 2021-06-25 RX ADMIN — PROPOFOL 200 MG: 10 INJECTION, EMULSION INTRAVENOUS at 08:03

## 2021-06-25 RX ADMIN — PROPOFOL 200 MG: 10 INJECTION, EMULSION INTRAVENOUS at 07:25

## 2021-06-25 RX ADMIN — HYDROMORPHONE HYDROCHLORIDE 0.5 MG: 1 INJECTION, SOLUTION INTRAMUSCULAR; INTRAVENOUS; SUBCUTANEOUS at 08:07

## 2021-06-25 RX ADMIN — MIDAZOLAM 2 MG: 1 INJECTION INTRAMUSCULAR; INTRAVENOUS at 07:20

## 2021-06-25 RX ADMIN — DEXAMETHASONE SODIUM PHOSPHATE 4 MG: 4 INJECTION INTRA-ARTICULAR; INTRALESIONAL; INTRAMUSCULAR; INTRAVENOUS; SOFT TISSUE at 07:32

## 2021-06-25 RX ADMIN — CEFAZOLIN SODIUM 1000 MG: 1 SOLUTION INTRAVENOUS at 07:39

## 2021-06-25 NOTE — DISCHARGE INSTRUCTIONS
Dr Kathi Patel Instructions    1  Take your prescribed medication as directed  2  Upon arrival at home, lie down and elevate your surgical foot on 2 pillows  3  Remain quiet, off your feet as much as possible, for the first 24-48 hours  This is when your feet first swell and may become painful  After 48 hours you may begin limited walking following these restrictions:   Weightbear as tolerated to surgical foot in surgical shoe  4  Drink large quantities of water  Consume no alcohol  Continue a well-balanced diet  5  Report any unusual discomfort or fever to this office  6  A limited amount of discomfort and swelling is to be expected  In some cases the skin may take on a bruised appearance  The surgical solution that was applied to your foot prior to the operation is dark in color and the operation site may appear to be oozing when it actually is not  7  A slight amount of blood is to be expected, and is no cause for alarm  Do not remove the dressings  If there is active bleeding and if the bleeding persists, add additional gauze to the bandage, apply direct pressure, elevate your feet and call this office  8  Do not get the dressings wet  As regular bathing may be inconvenient, sponge baths are recommended  9  When anesthesia wears off and if any discomfort should be present, apply an ice pack directly over the operated area for 15 minute intervals for several hours or until the pain leaves  (USE IN EXCESS OF 15 MINUTES COULD CAUSE FROSTBITE)  Do not use hot water bags or electric pads  A convenient icepack can be made by placing ice cubes in a plastic bag and covering this with a towel  10  If necessary, take a mild laxative before retiring  11  Wear your special open shoes anytime you put weight on your foot, even if it is just to walk to the bathroom and back  It will probably be 2 or 3 weeks before you will be permitted to try regular shoes    12  Having performed the operation, we are interested in a prompt recovery  Please cooperate by following the above instructions  13  Please call to confirm your post-op appointment or call with any other questions

## 2021-06-25 NOTE — ANESTHESIA PREPROCEDURE EVALUATION
Procedure:  BUNIONECTOMY with implant (Left Foot)    Relevant Problems   CARDIO   (+) Pain of left breast      GI/HEPATIC   (+) GERD (gastroesophageal reflux disease)      NEURO/PSYCH   (+) Encounter for follow-up examination after completed treatment for malignant neoplasm   (+) History of right breast cancer        Physical Exam    Airway    Mallampati score: II  TM Distance: >3 FB  Neck ROM: full     Dental       Cardiovascular  Rhythm: regular, Rate: normal,     Pulmonary  Breath sounds clear to auscultation,     Other Findings  cap      Anesthesia Plan  ASA Score- 2     Anesthesia Type- general and IV sedation with anesthesia with ASA Monitors  Additional Monitors:   Airway Plan:           Plan Factors-Exercise tolerance (METS): >4 METS  Chart reviewed  Existing labs reviewed  Patient summary reviewed  Patient is not a current smoker  Patient not instructed to abstain from smoking on day of procedure  Patient did not smoke on day of surgery  Induction- intravenous  Postoperative Plan-     Informed Consent- Anesthetic plan and risks discussed with patient

## 2021-06-25 NOTE — OP NOTE
OPERATIVE REPORT - Podiatry  PATIENT NAME: Mervat Rico    :  1970  MRN: [id number]  Pt Location: [facility] OR ROOM 03    SURGERY DATE: 2021    Surgeon(s) and Role:     * Luis Oliver DPM - Primary     * Jessica Duque DPM - Assisting    Pre-op Diagnosis:  Hallux rigidus of left foot [M20 22]    Post-Op Diagnosis Codes:     * Hallux rigidus of left foot [M20 22]    Procedure(s) (LRB):  1  BUNIONECTOMY WITH IMPLANT ARTHROPLASTY (Left)    Specimen(s):  * No specimens in log *    Estimated Blood Loss:   Minimal    Drains:  * No LDAs found *    Anesthesia Type:   General/LMA with 15 ml of 1% Lidocaine and 0 5% Bupivacaine in a 1:1 mixture    Hemostasis:  Surgical dissection  Pneumatic ankle tourniquet placed for 56 minutes    Materials:  Implant Name Type Inv  Item Serial No   Lot No  LRB No  Used Action   RESENDIZ FLEXIBLE TOE IMPLANTS W/ GROMMETS [facility] [id number]  RESENDIZ FLEXIBLE TOE IMPLANTS W/ GROMMETS 3S  [facility] [id number] Left 1 Implanted     Vicryl suture  Nylon suture    Operative Findings:  Consistent preoperative diagnosis  Degenerative cartilage changes noted of the 1st MPJ as well as osteophytes at the level of the joint  Complications:   None    Procedure and Technique:     Under mild sedation, the patient was brought into the operating room and placed on the operating room table in the supine position  A pneumatic tourniquet was then placed around the patient's left lower extremity with ample webril padding  A time out was performed to confirm the correct patient, procedure and site with all parties in agreement  Following IV sedation, a abraham block was performed consisting of 15 ml of 1% Lidocaine and 0 5% Bupivacaine in a 1:1 mixture  The foot was then scrubbed, prepped and draped in the usual aseptic manner  An esmarch bandage was utilized to exsangunate the patients foot and the tourniquet was then inflated   The esmarch bandage was removed and the foot was placed on the operating room table  Attention was then directed to the dorsomedial aspect of the first ray where a linear longitudinal incision was made  The incision was deepened through the subcutaneous tissues using sharp and blunt dissection  Care was taken to identify and retract all vital neurovascular and musculoskeletal structures  All bleeders were ligated and cauterized as necessary  At this time, a linear longitudinal capsular and periosteal incision was made at the first metatarsophalangeal joint  The capsule was reflected from the medial and lateral aspect of the first metatarsal head  Prominent exostosis was denuded using a sagittal saw to the metatarsal and base of proximal phalanx  The incision was again irrigated with normal sterile saline  The joint was then inspected for any cartilaginous defects, which were copious  At this time the base of the proximal phalanx and the head of the 1st metatarsal were removed with the use of sagittal saw  A Sizer was then used to determine the correct size implant  Per manufacture's guidelines, Villalobos implant 3S was deemed acceptable  Next, the 1st metatarsal head and base of proximal phalanx was prepped for implantation of implant  At this time, the incision was copiously irrigated with normal sterile saline  The Villalobos implant was then inserted according to standard manufacture protocol with grommets 1st placed in the 1st metatarsal head and base the proximal phalanx prior to insertion of implant  First MTPJ motion was noted to be improved above baseline  The surgical incision was irrigated with copious amounts of normal sterile saline  The periosteal and capsular structures were reapproximated using 3-0 Vicryl  Subcutaneous closure was obtained utilizing 4-0 Vicryl in an interrupted suture technique  Skin edges were reapproximated and closure was obtained utilizing interrupted retention sutures utilizing 4-0  Nylon   The foot was then cleansed and dried  A postoperative injection consisting of 6 ml of 0 5% Bupivacaine was performed  The incision site was dressed with Betadine soaked adaptic, 4x4 gauze, and Lenore  This was then covered with tape  The tourniquet was deflated and normal hyperemic flush was noted to all digits  The patient tolerated the procedure and anesthesia well and was transported to the PACU with vital signs stable  Dr Lawrence Marquez was present during the entire procedure and participated in all key aspects  SIGNATURE: Tommie Perez DPM  DATE: June 25, 2021  TIME: 8:42 AM      Portions of the record may have been created with voice recognition software  Occasional wrong word or "sound a like" substitutions may have occurred due to the inherent limitations of voice recognition software  Read the chart carefully and recognize, using context, where substitutions have occurred

## 2021-07-01 ENCOUNTER — OFFICE VISIT (OUTPATIENT)
Dept: PODIATRY | Facility: CLINIC | Age: 51
End: 2021-07-01

## 2021-07-01 VITALS — HEART RATE: 76 BPM | DIASTOLIC BLOOD PRESSURE: 74 MMHG | SYSTOLIC BLOOD PRESSURE: 108 MMHG

## 2021-07-01 DIAGNOSIS — M20.22 HALLUX RIGIDUS OF LEFT FOOT: Primary | ICD-10-CM

## 2021-07-01 PROCEDURE — 99024 POSTOP FOLLOW-UP VISIT: CPT | Performed by: PODIATRIST

## 2021-07-01 NOTE — PROGRESS NOTES
Patient presents  6  days post implant arthroplasty left 1st MPJ  Surgical site is healing uneventfully  Mild to moderate pain and edema noted all within normal limits for procedure performed  Patient is to continue with current orders  Patient is rescheduled in 1 week for possible suture removal    Encouraged gentle range of motion exercises

## 2021-07-08 ENCOUNTER — OFFICE VISIT (OUTPATIENT)
Dept: PODIATRY | Facility: CLINIC | Age: 51
End: 2021-07-08

## 2021-07-08 VITALS
SYSTOLIC BLOOD PRESSURE: 116 MMHG | BODY MASS INDEX: 29.58 KG/M2 | DIASTOLIC BLOOD PRESSURE: 76 MMHG | HEIGHT: 63 IN | HEART RATE: 89 BPM

## 2021-07-08 DIAGNOSIS — M20.22 HALLUX RIGIDUS OF LEFT FOOT: Primary | ICD-10-CM

## 2021-07-08 PROCEDURE — 99024 POSTOP FOLLOW-UP VISIT: CPT | Performed by: PODIATRIST

## 2021-07-08 NOTE — PROGRESS NOTES
Patient presents   Thirteen days postop  Implant arthroplasty left great toe joint  Surgical site healing uneventfully  Minimal pain related  Only mild edema present  There is no evidence of infection  All sutures were removed  Patient may get left foot wet  She should continue in surgical shoe for 1 more week  She will be rescheduled in 1 week

## 2021-07-14 ENCOUNTER — OFFICE VISIT (OUTPATIENT)
Dept: PODIATRY | Facility: CLINIC | Age: 51
End: 2021-07-14

## 2021-07-14 VITALS
HEIGHT: 63 IN | BODY MASS INDEX: 29.58 KG/M2 | HEART RATE: 73 BPM | DIASTOLIC BLOOD PRESSURE: 68 MMHG | SYSTOLIC BLOOD PRESSURE: 103 MMHG

## 2021-07-14 DIAGNOSIS — M20.22 HALLUX RIGIDUS OF LEFT FOOT: Primary | ICD-10-CM

## 2021-07-14 PROCEDURE — 99024 POSTOP FOLLOW-UP VISIT: CPT | Performed by: PODIATRIST

## 2021-07-14 NOTE — PROGRESS NOTES
Patient presents 19 days post implant arthroplasty left great toe joint  Surgical site healing uneventfully  Only mild edema and mild discomfort related  No evidence of infection  Patient may return to a running shoe in 2 days  She will be reassessed in 3 weeks

## 2021-08-02 ENCOUNTER — HOSPITAL ENCOUNTER (OUTPATIENT)
Dept: MAMMOGRAPHY | Facility: HOSPITAL | Age: 51
Discharge: HOME | End: 2021-08-02
Payer: COMMERCIAL

## 2021-08-02 VITALS — WEIGHT: 167 LBS | BODY MASS INDEX: 29.59 KG/M2 | HEIGHT: 63 IN

## 2021-08-02 DIAGNOSIS — Z12.31 VISIT FOR SCREENING MAMMOGRAM: ICD-10-CM

## 2021-08-02 PROCEDURE — 77067 SCR MAMMO BI INCL CAD: CPT

## 2021-08-02 PROCEDURE — 77063 BREAST TOMOSYNTHESIS BI: CPT

## 2021-08-03 ENCOUNTER — TELEPHONE (OUTPATIENT)
Dept: FAMILY MEDICINE CLINIC | Facility: CLINIC | Age: 51
End: 2021-08-03

## 2021-08-03 DIAGNOSIS — T75.3XXA MOTION SICKNESS, INITIAL ENCOUNTER: Primary | ICD-10-CM

## 2021-08-03 RX ORDER — SCOLOPAMINE TRANSDERMAL SYSTEM 1 MG/1
PATCH, EXTENDED RELEASE TRANSDERMAL
Qty: 6 PATCH | Refills: 1 | Status: SHIPPED | OUTPATIENT
Start: 2021-08-03

## 2021-08-03 NOTE — TELEPHONE ENCOUNTER
She wants the patches for flying, she will need a total of 6 of them, she has 3 flights, too and from    She said you prescribed in the past

## 2021-08-05 ENCOUNTER — OFFICE VISIT (OUTPATIENT)
Dept: PODIATRY | Facility: CLINIC | Age: 51
End: 2021-08-05

## 2021-08-05 VITALS
WEIGHT: 167 LBS | BODY MASS INDEX: 29.59 KG/M2 | DIASTOLIC BLOOD PRESSURE: 75 MMHG | HEIGHT: 63 IN | HEART RATE: 89 BPM | SYSTOLIC BLOOD PRESSURE: 112 MMHG

## 2021-08-05 DIAGNOSIS — M20.22 HALLUX RIGIDUS OF LEFT FOOT: Primary | ICD-10-CM

## 2021-08-05 PROCEDURE — 99024 POSTOP FOLLOW-UP VISIT: CPT | Performed by: PODIATRIST

## 2021-08-05 NOTE — PROGRESS NOTES
Patient presents approximately 6 weeks post implant arthroplasty left foot  Patient having minimal discomfort  She is wearing a running shoe without incidence  Patient told that she may return to all shoes pain permitted  She is a  and plans to go to work at the end of the month and she was given the okay  Reappoint p r n

## 2021-08-10 ENCOUNTER — HOSPITAL ENCOUNTER (OUTPATIENT)
Dept: ULTRASOUND IMAGING | Facility: CLINIC | Age: 51
Discharge: HOME | End: 2021-08-10
Payer: COMMERCIAL

## 2021-08-10 ENCOUNTER — HOSPITAL ENCOUNTER (OUTPATIENT)
Dept: MAMMOGRAPHY | Facility: CLINIC | Age: 51
Discharge: HOME | End: 2021-08-10
Payer: COMMERCIAL

## 2021-08-10 DIAGNOSIS — R92.8 ABNORMAL SCREENING MAMMOGRAM: ICD-10-CM

## 2021-08-10 PROCEDURE — G0279 TOMOSYNTHESIS, MAMMO: HCPCS

## 2021-08-10 PROCEDURE — 77065 DX MAMMO INCL CAD UNI: CPT

## 2021-08-10 PROCEDURE — 76642 ULTRASOUND BREAST LIMITED: CPT

## 2021-09-02 DIAGNOSIS — M20.22 HALLUX RIGIDUS OF LEFT FOOT: ICD-10-CM

## 2021-09-02 RX ORDER — MELOXICAM 15 MG/1
15 TABLET ORAL DAILY
Qty: 90 TABLET | Refills: 0 | Status: CANCELLED | OUTPATIENT
Start: 2021-09-02 | End: 2021-12-01

## 2021-09-03 DIAGNOSIS — M20.22 HALLUX RIGIDUS OF LEFT FOOT: Primary | ICD-10-CM

## 2021-09-03 RX ORDER — MELOXICAM 15 MG/1
15 TABLET ORAL DAILY
Qty: 90 TABLET | Refills: 0 | Status: SHIPPED | OUTPATIENT
Start: 2021-09-03 | End: 2022-08-08

## 2021-09-15 ENCOUNTER — OFFICE VISIT (OUTPATIENT)
Dept: SURGICAL ONCOLOGY | Facility: CLINIC | Age: 51
End: 2021-09-15
Payer: COMMERCIAL

## 2021-09-15 VITALS
HEIGHT: 63 IN | TEMPERATURE: 97.8 F | HEART RATE: 84 BPM | RESPIRATION RATE: 17 BRPM | OXYGEN SATURATION: 98 % | WEIGHT: 167.5 LBS | SYSTOLIC BLOOD PRESSURE: 112 MMHG | BODY MASS INDEX: 29.68 KG/M2 | DIASTOLIC BLOOD PRESSURE: 72 MMHG

## 2021-09-15 DIAGNOSIS — R92.8 ABNORMAL FINDING ON BREAST IMAGING: ICD-10-CM

## 2021-09-15 DIAGNOSIS — Z85.3 HISTORY OF RIGHT BREAST CANCER: ICD-10-CM

## 2021-09-15 DIAGNOSIS — Z08 ENCOUNTER FOR FOLLOW-UP EXAMINATION AFTER COMPLETED TREATMENT FOR MALIGNANT NEOPLASM: Primary | ICD-10-CM

## 2021-09-15 PROCEDURE — 99213 OFFICE O/P EST LOW 20 MIN: CPT | Performed by: NURSE PRACTITIONER

## 2021-09-15 NOTE — PROGRESS NOTES
Surgical Oncology Follow Up       [address] [facility] SURGICAL ONCOLOGY [place]  [address]  [address]  [facility] [address]    Marquis Preston  1970  [id number]  [address] [facility] [place]  [address] [address]    Chief Complaint   Patient presents with    Follow-up       Assessment/Plan:  1  Encounter for follow-up examination after completed treatment for malignant neoplasm      2  History of right breast cancer  - Mammo diagnostic bilateral w 3d & cad; Future  - 1 year f/u visit    3  Abnormal finding on breast imaging  - US breast left limited (diagnostic); Future  - Mammo diagnostic bilateral w 3d & cad; Future    Discussion/Summary: Patient is a 52 year old female that presents today for a one year follow-up for right breast cancer diagnosed in September 2014  Her pathology revealed DCIS, %, %  She underwent a right lumpectomy and completed whole breast radiation therapy  She underwent genetic testing for BRCA 1 and 2 which was negative  She is declining further genetic testing  She had a bilateral 3D screening mammogram on August 2, 2021 which revealed a left breast asymmetry  She underwent a diagnostic mammogram and ultrasound which revealed a 9 x 6 x 8 mm cluster of microcysts at the 11 o'clock position, 3 cm from the nipple  A six-month follow-up ultrasound was recommended to ensure stability  I will order this for her today  She offers no new complaints today and there are no concerns on today's exam   Will plan to see her back in 1 year, following her mammogram   She was instructed to contact me with any new concerns or symptoms prior to her next visit  All of her questions were answered today        History of Present Illness:     Oncology History   History of right breast cancer   8/25/2014 Initial Diagnosis    Ductal carcinoma in situ (DCIS) of right breast     9/23/2014 Surgery    Right lumpectomy (Dr Tiffany Hickey)      - 12/16/2014 Radiation    WBRT (Dr Gregorio Price, [place], [place])     2015 Genetic Testing    BRCA1, BRCA2 negative          -Interval History:  Patient notices no changes on her self breast exam   Denies persistent headaches, back pain or bone pain, cough or shortness of breath, abdominal pain  She had a bilateral 3D screening mammogram on August 2, 2021 which revealed a left breast asymmetry  She underwent a diagnostic mammogram and ultrasound which revealed a 9 x 6 x 8 mm cluster of microcysts at the 11 o'clock position, 3 cm from the nipple  A six-month follow-up ultrasound was recommended to ensure stability  She states that she had some pain in her left axillary region a few weeks ago but this has now resolved  Review of Systems:  Review of Systems   Constitutional: Negative for activity change, appetite change, chills, fatigue, fever and unexpected weight change  Respiratory: Negative for cough and shortness of breath  Cardiovascular: Negative for chest pain  Gastrointestinal: Negative for abdominal pain, constipation, diarrhea, nausea and vomiting  Musculoskeletal: Negative for arthralgias, back pain, gait problem and myalgias  Skin: Negative for color change and rash  Neurological: Negative for dizziness and headaches  Hematological: Negative for adenopathy  Psychiatric/Behavioral: Negative for agitation and confusion  All other systems reviewed and are negative        Patient Active Problem List   Diagnosis    History of right breast cancer    Chronic fatigue    Dermatitis    GERD (gastroesophageal reflux disease)    Greater trochanteric bursitis of right hip    Pain, pelvic, female    Seasonal allergies    Encounter for follow-up examination after completed treatment for malignant neoplasm    Pain of left breast    Family history of cancer    Heberden's node    Hallux rigidus of left foot     Past Medical History:   Diagnosis Date    Allergic rhinitis     Arthritis     Osteoarthritis in right hand    Benign paroxysmal vertigo     BRCA1 negative     BRCA2 negative     [facility] 2014    rt breast    Bunion, left foot     OR correction today 6/25/2021    [facility] ) 8/11/2014    Dcis rt breast    Fracture of phalanx of toe 07/21/2015    Last assessed    History of radiation exposure     Tendinitis 04/13/2015    Last assessed    Wears glasses      Past Surgical History:   Procedure Laterality Date    BREAST LUMPECTOMY W/ NEEDLE LOCALIZATION Right 09/23/2014    Dr Manjinder Hauser W/ BIOPSY / [place] RIGIDUS W/CHEILECTOMY 1ST MP JT W/IMPLT Left 6/25/2021    Procedure: IMPLANT ARTHROPLASTY;  Surgeon: Curly Rodriguez DPM;  Location: [facility];  Service: Podiatry     Family History   Problem Relation Age of Onset    Pancreatic cancer Maternal Grandfather     Cancer Maternal Grandfather     Lung cancer Paternal Grandmother     Cancer Paternal Grandmother         Lung    No Known Problems Mother     Heart disease Father     Diabetes Son     Breast cancer Family     Diabetes Family     No Known Problems Maternal Grandmother     No Known Problems Paternal Grandfather     No Known Problems Maternal Aunt     No Known Problems Maternal Aunt     No Known Problems Maternal Aunt     No Known Problems Paternal Aunt      Social History     Socioeconomic History    Marital status: /Civil Union     Spouse name: Not on file    Number of children: Not on file    Years of education: Not on file    Highest education level: Not on file   Occupational History    Not on file   Tobacco Use    Smoking status: Never Smoker    Smokeless tobacco: Never Used   Vaping Use    Vaping Use: Never used   Substance and Sexual Activity    Alcohol use:  Yes     Alcohol/week: 1 0 standard drinks     Types: 1 Standard drinks or equivalent per week Comment: social   1 x weekly    Drug use: No    Sexual activity: Yes     Partners: Male     Birth control/protection: Male Sterilization   Other Topics Concern    Not on file   Social History Narrative    Not on file     Social Determinants of Health     Financial Resource Strain:     Difficulty of Paying Living Expenses:    Food Insecurity:     Worried About Running Out of Food in the Last Year:     [address] in the Last Year:    Transportation Needs:     Lack of Transportation (Medical):      Lack of Transportation (Non-Medical):    Physical Activity:     Days of Exercise per Week:     Minutes of Exercise per Session:    Stress:     Feeling of Stress :    Social Connections:     Frequency of Communication with Friends and Family:     Frequency of Social Gatherings with Friends and Family:     Attends Church Services:     Active Member of Clubs or Organizations:     Attends Club or Organization Meetings:     Marital Status:    Intimate Partner Violence:     Fear of Current or Ex-Partner:     Emotionally Abused:     Physically Abused:     Sexually Abused:        Current Outpatient Medications:     fluticasone (FLONASE) 50 mcg/act nasal spray, INHALE 2 SPRAYS VIA EACH NOSTRIL ONCE DAILY, Disp: 48 g, Rfl: 0    loratadine (CLARITIN) 10 mg tablet, Take 10 mg by mouth daily, Disp: , Rfl:     meloxicam (MOBIC) 15 mg tablet, Take 1 tablet (15 mg total) by mouth daily, Disp: 90 tablet, Rfl: 0    Multiple Vitamin (DAILY VALUE MULTIVITAMIN PO), Take 1 tablet by mouth daily, Disp: , Rfl:     scopolamine (TRANSDERM-SCOP) 1 5 mg/3 days TD 72 hr patch, Place 1 patch behind ear at least 3 hours prior to airline flight, may leave on for 72 hours for secondary flights same-day, Disp: 6 patch, Rfl: 1    meloxicam (MOBIC) 15 mg tablet, Take 1 tablet (15 mg total) by mouth daily, Disp: 90 tablet, Rfl: 0  Allergies   Allergen Reactions    Sulfa Antibiotics Rash     Vitals:    09/15/21 1357   BP: 112/72   Pulse: 84   Resp: 17   Temp: 97 8 °F (36 6 °C)   SpO2: 98%       Physical Exam  Vitals reviewed  Constitutional:       General: She is not in acute distress  Appearance: Normal appearance  She is well-developed  She is not diaphoretic  HENT:      Head: Normocephalic and atraumatic  Cardiovascular:      Rate and Rhythm: Normal rate and regular rhythm  Heart sounds: Normal heart sounds  Pulmonary:      Effort: Pulmonary effort is normal       Breath sounds: Normal breath sounds  Chest:      Breasts:         Right: Skin change (Surgical scar) present  No swelling, bleeding, inverted nipple, mass, nipple discharge or tenderness  Left: No swelling, bleeding, inverted nipple, mass, nipple discharge, skin change or tenderness  Abdominal:      Palpations: Abdomen is soft  There is no mass  Tenderness: There is no abdominal tenderness  Musculoskeletal:         General: Normal range of motion  Cervical back: Normal range of motion  Lymphadenopathy:      Upper Body:      Right upper body: No supraclavicular or axillary adenopathy  Left upper body: No supraclavicular or axillary adenopathy  Skin:     General: Skin is warm and dry  Findings: No rash  Neurological:      Mental Status: She is alert and oriented to person, place, and time  Psychiatric:         Speech: Speech normal            Advance Care Planning/Advance Directives:  Discussed disease status, cancer treatment plans and/or cancer treatment goals with the patient

## 2021-12-02 DIAGNOSIS — M20.22 HALLUX RIGIDUS OF LEFT FOOT: ICD-10-CM

## 2021-12-02 RX ORDER — MELOXICAM 15 MG/1
15 TABLET ORAL DAILY
Qty: 90 TABLET | Refills: 0 | Status: CANCELLED | OUTPATIENT
Start: 2021-12-02 | End: 2022-03-02

## 2021-12-09 ENCOUNTER — TELEPHONE (OUTPATIENT)
Dept: PAIN MEDICINE | Facility: MEDICAL CENTER | Age: 51
End: 2021-12-09

## 2021-12-09 DIAGNOSIS — M20.22 HALLUX RIGIDUS OF LEFT FOOT: Primary | ICD-10-CM

## 2021-12-09 RX ORDER — MELOXICAM 15 MG/1
15 TABLET ORAL DAILY
Qty: 90 TABLET | Refills: 0 | Status: SHIPPED | OUTPATIENT
Start: 2021-12-09 | End: 2022-08-08

## 2021-12-11 ENCOUNTER — OFFICE VISIT (OUTPATIENT)
Dept: URGENT CARE | Facility: CLINIC | Age: 51
End: 2021-12-11
Payer: COMMERCIAL

## 2021-12-11 VITALS
DIASTOLIC BLOOD PRESSURE: 63 MMHG | TEMPERATURE: 97.7 F | RESPIRATION RATE: 18 BRPM | OXYGEN SATURATION: 97 % | WEIGHT: 167 LBS | SYSTOLIC BLOOD PRESSURE: 140 MMHG | BODY MASS INDEX: 29.58 KG/M2 | HEART RATE: 84 BPM

## 2021-12-11 DIAGNOSIS — L30.9 DERMATITIS: Primary | ICD-10-CM

## 2021-12-11 PROCEDURE — G0383 LEV 4 HOSP TYPE B ED VISIT: HCPCS | Performed by: NURSE PRACTITIONER

## 2021-12-11 RX ORDER — FAMOTIDINE 10 MG
10 TABLET ORAL 2 TIMES DAILY
Qty: 10 TABLET | Refills: 0 | Status: SHIPPED | OUTPATIENT
Start: 2021-12-11 | End: 2021-12-16

## 2021-12-11 RX ORDER — PREDNISONE 10 MG/1
TABLET ORAL
Qty: 24 TABLET | Refills: 0 | Status: SHIPPED | OUTPATIENT
Start: 2021-12-11

## 2022-01-10 ENCOUNTER — TELEMEDICINE (OUTPATIENT)
Dept: FAMILY MEDICINE CLINIC | Facility: CLINIC | Age: 52
End: 2022-01-10
Payer: COMMERCIAL

## 2022-01-10 VITALS — HEIGHT: 63 IN | BODY MASS INDEX: 30.12 KG/M2 | TEMPERATURE: 98.3 F | WEIGHT: 170 LBS

## 2022-01-10 DIAGNOSIS — B34.9 VIRAL INFECTION, UNSPECIFIED: Primary | ICD-10-CM

## 2022-01-10 PROCEDURE — 87636 SARSCOV2 & INF A&B AMP PRB: CPT | Performed by: PHYSICIAN ASSISTANT

## 2022-01-10 PROCEDURE — 99213 OFFICE O/P EST LOW 20 MIN: CPT | Performed by: PHYSICIAN ASSISTANT

## 2022-01-10 NOTE — PROGRESS NOTES
COVID-19 Outpatient Progress Note    Assessment/Plan:    Problem List Items Addressed This Visit     None      Visit Diagnoses     Viral infection, unspecified    -  Primary    Relevant Orders    Covid/Flu- Mobile Van or Care Now Collect         Disposition:     Referred patient to centralized site to test for COVID-19/Influenza  I recommended self-quarantine for 10 days and to watch for symptoms until 14 days after exposure  If patient were to develop symptoms, they should self isolate and call our office for further guidance  Took rapid test this morning at work [employer]), was negative  Brother + for covid last week, pt with  Multiple exposures to him  I have spent 10 minutes directly with the patient  Greater than 50% of this time was spent in counseling/coordination of care regarding: prognosis, risks and benefits of treatment options, instructions for management, patient and family education, importance of treatment compliance, risk factor reductions and impressions  Encounter provider Maximo Blair PA-C    Provider located at [address] [place], [place] [id number]    Recent Visits  No visits were found meeting these conditions  Showing recent visits within past 7 days and meeting all other requirements  Today's Visits  Date Type Provider Dept   01/10/22 Telemedicine Maximo Blair PA-C [facility]   Showing today's visits and meeting all other requirements  Future Appointments  No visits were found meeting these conditions  Showing future appointments within next 150 days and meeting all other requirements     This virtual check-in was done via [employer] and patient was informed that this is a secure, HIPAA-compliant platform  She agrees to proceed  Patient agrees to participate in a virtual check in via telephone or video visit instead of presenting to the office to address urgent/immediate medical needs   Patient is aware this is a billable service  After connecting through [facility], the patient was identified by name and date of birth  Amarilis Nicholas was informed that this was a telemedicine visit and that the exam was being conducted confidentially over secure lines  My office door was closed  No one else was in the room  Amarilis Nicholas acknowledged consent and understanding of privacy and security of the telemedicine visit  I informed the patient that I have reviewed her record in Epic and presented the opportunity for her to ask any questions regarding the visit today  The patient agreed to participate  Verification of patient location:  Patient is located in the following state in which I hold an active license: [place]    Subjective:   Amarilis Nicholas is a 50 y o  female who is concerned about COVID-19  Patient's symptoms include fever, chills, nasal congestion, rhinorrhea, sore throat, cough, myalgias and headache  Patient denies fatigue, shortness of breath, chest tightness, abdominal pain, nausea, vomiting and diarrhea       - Date of symptom onset: 1/8/2022      COVID-19 vaccination status: Fully vaccinated (primary series)    Exposure:   Contact with a person who is under investigation (PUI) for or who is positive for COVID-19 within the last 14 days?: Yes    Hospitalized recently for fever and/or lower respiratory symptoms?: No      Currently a healthcare worker that is involved in direct patient care?: No      Works in a special setting where the risk of COVID-19 transmission may be high? (this may include long-term care, correctional and MCC facilities; homeless shelters; assisted-living facilities and group homes ): Yes      Resident in a special setting where the risk of COVID-19 transmission may be high? (this may include long-term care, correctional and MCC facilities; homeless shelters; assisted-living facilities and group homes ): No      No results found for: [address], [address] [facility], Giselle Dorantes, [address]  Past Medical History:   Diagnosis Date    Allergic rhinitis     Arthritis     Osteoarthritis in right hand    Benign paroxysmal vertigo     BRCA1 negative     BRCA2 negative     [facility] 2014    rt breast    Bunion, left foot     OR correction today 6/25/2021    Cancer [facility] ) 8/11/2014    Dcis rt breast    Fracture of phalanx of toe 07/21/2015    Last assessed    History of radiation exposure     Tendinitis 04/13/2015    Last assessed    Wears glasses      Past Surgical History:   Procedure Laterality Date    BREAST LUMPECTOMY W/ NEEDLE LOCALIZATION Right 09/23/2014    Dr Leonel Damon / [place] RIGIDUS W/CHEILECTOMY 1ST MP JT W/IMPLT Left 6/25/2021    Procedure: IMPLANT ARTHROPLASTY;  Surgeon: Liz Diaz DPM;  Location: [facility];  Service: Podiatry     Current Outpatient Medications   Medication Sig Dispense Refill    loratadine (CLARITIN) 10 mg tablet Take 10 mg by mouth daily      meloxicam (Mobic) 15 mg tablet Take 1 tablet (15 mg total) by mouth daily 90 tablet 0    Multiple Vitamin (DAILY VALUE MULTIVITAMIN PO) Take 1 tablet by mouth daily      famotidine (PEPCID) 10 mg tablet Take 1 tablet (10 mg total) by mouth 2 (two) times a day for 5 days (Patient not taking: Reported on 1/10/2022 ) 10 tablet 0    fluticasone (FLONASE) 50 mcg/act nasal spray INHALE 2 SPRAYS VIA EACH NOSTRIL ONCE DAILY (Patient not taking: Reported on 1/10/2022) 48 g 0    meloxicam (MOBIC) 15 mg tablet Take 1 tablet (15 mg total) by mouth daily (Patient not taking: Reported on 1/10/2022 ) 90 tablet 0    meloxicam (MOBIC) 15 mg tablet Take 1 tablet (15 mg total) by mouth daily 90 tablet 0    predniSONE 10 mg tablet Take 5 tabs po x 2 days; 4 tabs po x 2 days; 3 tabs po x 1 day; 2 tabs po x 1 day  1 tab po x 1 day   (Patient not taking: Reported on 1/10/2022 ) 24 tablet 0    scopolamine (TRANSDERM-SCOP) 1 5 mg/3 days TD 72 hr patch Place 1 patch behind ear at least 3 hours prior to airline flight, may leave on for 72 hours for secondary flights same-day (Patient not taking: Reported on 12/11/2021 ) 6 patch 1     No current facility-administered medications for this visit  Allergies   Allergen Reactions    Sulfa Antibiotics Rash       Review of Systems   Constitutional: Positive for chills and fever  Negative for fatigue  HENT: Positive for congestion, rhinorrhea and sore throat  Respiratory: Positive for cough  Negative for chest tightness and shortness of breath  Gastrointestinal: Negative for abdominal pain, diarrhea, nausea and vomiting  Musculoskeletal: Positive for myalgias  Neurological: Positive for headaches  Objective:    Vitals:    01/10/22 1443   Temp: 98 3 °F (36 8 °C)   Weight: 77 1 kg (170 lb)   Height: 5' 3" (1 6 m)       Physical Exam  Vitals reviewed  Constitutional:       General: She is not in acute distress  Appearance: She is normal weight  She is ill-appearing  She is not toxic-appearing or diaphoretic  HENT:      Head: Normocephalic and atraumatic  Pulmonary:      Effort: Pulmonary effort is normal  No respiratory distress  Comments: Pt speaking in clear, fluent sentences without cough or apparent SOB  Neurological:      Mental Status: She is alert  VIRTUAL VISIT DISCLAIMER    Elzadesirae Rust verbally agrees to participate in [employer]  Pt is aware that [employer] could be limited without vital signs or the ability to perform a full hands-on physical exam  Sandi Vera understands she or the provider may request at any time to terminate the video visit and request the patient to seek care or treatment in person

## 2022-01-11 LAB
FLUAV RNA RESP QL NAA+PROBE: NEGATIVE
FLUBV RNA RESP QL NAA+PROBE: NEGATIVE
SARS-COV-2 RNA RESP QL NAA+PROBE: POSITIVE

## 2022-01-12 ENCOUNTER — TELEMEDICINE (OUTPATIENT)
Dept: FAMILY MEDICINE CLINIC | Facility: CLINIC | Age: 52
End: 2022-01-12
Payer: COMMERCIAL

## 2022-01-12 VITALS — TEMPERATURE: 97.8 F | BODY MASS INDEX: 30.12 KG/M2 | WEIGHT: 170 LBS | HEIGHT: 63 IN

## 2022-01-12 DIAGNOSIS — U07.1 COVID-19: Primary | ICD-10-CM

## 2022-01-12 PROCEDURE — 99213 OFFICE O/P EST LOW 20 MIN: CPT | Performed by: PHYSICIAN ASSISTANT

## 2022-01-12 NOTE — PROGRESS NOTES
COVID-19 Outpatient Progress Note    Assessment/Plan:    Problem List Items Addressed This Visit     None      Visit Diagnoses     COVID-19    -  Primary         Disposition:     Patient has COVID-19 infection  Based off CDC guidelines, they were recommended to isolate for 5 days from the date of the positive test  If they remain asymptomatic, isolation may be ended followed by 5 days of wearing a mask when around othes to minimize risk of infecting others  If they have a fever, continue to stay home until fever resolves for at least 24 hours  Pt to return to work 1/13/2022 with strict masking over the next 5 days  I have spent 10 minutes directly with the patient  Greater than 50% of this time was spent in counseling/coordination of care regarding: diagnostic results, prognosis, risks and benefits of treatment options, instructions for management, patient and family education, importance of treatment compliance, risk factor reductions and impressions  Encounter provider Suzanna Gurrola PA-C    Provider located at [address] [place] [id number]    Recent Visits  Date Type Provider Dept   01/10/22 Telemedicine Suzanna Gurrola PA-C [facility]   Showing recent visits within past 7 days and meeting all other requirements  Today's Visits  Date Type Provider Dept   01/12/22 Telemedicine Suzanna Gurrola PA-C [facility]   Showing today's visits and meeting all other requirements  Future Appointments  No visits were found meeting these conditions  Showing future appointments within next 150 days and meeting all other requirements     This virtual check-in was done via [employer] and patient was informed that this is a secure, HIPAA-compliant platform  She agrees to proceed  Patient agrees to participate in a virtual check in via telephone or video visit instead of presenting to the office to address urgent/immediate medical needs   Patient is aware this is a billable service  After connecting through [facility], the patient was identified by name and date of birth  Juan David Salcedo was informed that this was a telemedicine visit and that the exam was being conducted confidentially over secure lines  My office door was closed  No one else was in the room  Juan David Salcedo acknowledged consent and understanding of privacy and security of the telemedicine visit  I informed the patient that I have reviewed her record in Epic and presented the opportunity for her to ask any questions regarding the visit today  The patient agreed to participate  Verification of patient location:  Patient is located in the following state in which I hold an active license: [place]    Subjective:   Juan David Salcedo is a 50 y o  female who has been screened for COVID-19  Symptom change since last report: resolving  Patient's symptoms include sore throat (improving)  Patient denies fever, chills, fatigue, malaise, congestion, rhinorrhea, anosmia, loss of taste, cough, shortness of breath, chest tightness, abdominal pain, nausea, vomiting, diarrhea, myalgias and headaches  - Date of symptom onset: 1/8/2022  - Date of positive COVID-19 PCR: 1/10/2022    COVID-19 vaccination status: Fully vaccinated (primary series)    Anne Marie Kern has been staying home and has isolated themselves in her home  She is taking care to not share personal items and is cleaning all surfaces that are touched often, like counters, tabletops, and doorknobs using household cleaning sprays or wipes  She is wearing a mask when she leaves her room       Lab Results   Component Value Date    SARSCOV2 Positive (A) 01/10/2022     Past Medical History:   Diagnosis Date    Allergic rhinitis     Arthritis     Osteoarthritis in right hand    Benign paroxysmal vertigo     BRCA1 negative     BRCA2 negative     Breast cancer ([facility] [address] ) 2014    rt breast    Bunion, left foot     OR correction today 6/25/2021  Cancer ([facility] [address] ) 8/11/2014    Dcis rt breast    Fracture of phalanx of toe 07/21/2015    Last assessed    History of radiation exposure     Tendinitis 04/13/2015    Last assessed    Wears glasses      Past Surgical History:   Procedure Laterality Date    BREAST LUMPECTOMY W/ NEEDLE LOCALIZATION Right 09/23/2014    Dr Jolanta So / [facility] RIGIDUS W/CHEILECTOMY 1ST MP JT W/IMPLT Left 6/25/2021    Procedure: IMPLANT ARTHROPLASTY;  Surgeon: Moe Mandujano DPM;  Location: [facility];  Service: Podiatry     Current Outpatient Medications   Medication Sig Dispense Refill    loratadine (CLARITIN) 10 mg tablet Take 10 mg by mouth daily      meloxicam (Mobic) 15 mg tablet Take 1 tablet (15 mg total) by mouth daily 90 tablet 0    Multiple Vitamin (DAILY VALUE MULTIVITAMIN PO) Take 1 tablet by mouth daily      famotidine (PEPCID) 10 mg tablet Take 1 tablet (10 mg total) by mouth 2 (two) times a day for 5 days (Patient not taking: Reported on 1/10/2022 ) 10 tablet 0    fluticasone (FLONASE) 50 mcg/act nasal spray INHALE 2 SPRAYS VIA EACH NOSTRIL ONCE DAILY (Patient not taking: Reported on 1/10/2022) 48 g 0    meloxicam (MOBIC) 15 mg tablet Take 1 tablet (15 mg total) by mouth daily (Patient not taking: Reported on 1/10/2022 ) 90 tablet 0    meloxicam (MOBIC) 15 mg tablet Take 1 tablet (15 mg total) by mouth daily 90 tablet 0    predniSONE 10 mg tablet Take 5 tabs po x 2 days; 4 tabs po x 2 days; 3 tabs po x 1 day; 2 tabs po x 1 day  1 tab po x 1 day  (Patient not taking: Reported on 1/10/2022 ) 24 tablet 0    scopolamine (TRANSDERM-SCOP) 1 5 mg/3 days TD 72 hr patch Place 1 patch behind ear at least 3 hours prior to airline flight, may leave on for 72 hours for secondary flights same-day (Patient not taking: Reported on 12/11/2021 ) 6 patch 1     No current facility-administered medications for this visit       Allergies   Allergen Reactions    Sulfa Antibiotics Rash       Review of Systems   Constitutional: Negative for chills, fatigue and fever  HENT: Positive for sore throat (improving)  Negative for congestion and rhinorrhea  Respiratory: Negative for cough, chest tightness and shortness of breath  Gastrointestinal: Negative for abdominal pain, diarrhea, nausea and vomiting  Musculoskeletal: Negative for myalgias  Neurological: Negative for headaches  Objective:    Vitals:    01/12/22 0829   Temp: 97 8 °F (36 6 °C)   Weight: 77 1 kg (170 lb)   Height: 5' 3" (1 6 m)       Physical Exam  Vitals reviewed  Constitutional:       General: She is not in acute distress  Appearance: Normal appearance  She is well-developed  She is not ill-appearing, toxic-appearing or diaphoretic  HENT:      Head: Normocephalic and atraumatic  Pulmonary:      Effort: Pulmonary effort is normal  No respiratory distress  Comments: Pt speaking in clear, fluent sentences without cough or apparent SOB  Neurological:      Mental Status: She is alert and oriented to person, place, and time  Psychiatric:         Behavior: Behavior normal  Behavior is cooperative  Thought Content: Thought content normal          Judgment: Judgment normal          VIRTUAL VISIT DISCLAIMER    Don Gould verbally agrees to participate in [employer]  Pt is aware that [employer] could be limited without vital signs or the ability to perform a full hands-on physical exam  Sandi Vera understands she or the provider may request at any time to terminate the video visit and request the patient to seek care or treatment in person

## 2022-01-12 NOTE — LETTER
January 12, 2022    Patient: Khalif Walton  YOB: 1970  Date of Last Encounter: 1/10/2022      To whom it may concern:     Khalif Walton has tested positive for COVID-19 (Coronavirus)  She may return to work on 1/13/2022, which is 5 days from illness onset  She must abide by strict masking protocol for 5 days afterward       Sincerely,         Zahra Dent PA-C

## 2022-01-24 ENCOUNTER — ANNUAL EXAM (OUTPATIENT)
Dept: OBGYN CLINIC | Facility: CLINIC | Age: 52
End: 2022-01-24
Payer: COMMERCIAL

## 2022-01-24 VITALS
WEIGHT: 169 LBS | DIASTOLIC BLOOD PRESSURE: 60 MMHG | BODY MASS INDEX: 29.95 KG/M2 | SYSTOLIC BLOOD PRESSURE: 105 MMHG | HEIGHT: 63 IN

## 2022-01-24 DIAGNOSIS — N95.1 MENOPAUSAL SYMPTOMS: ICD-10-CM

## 2022-01-24 DIAGNOSIS — Z85.3 HISTORY OF RIGHT BREAST CANCER: ICD-10-CM

## 2022-01-24 DIAGNOSIS — Z12.11 SCREEN FOR COLON CANCER: ICD-10-CM

## 2022-01-24 DIAGNOSIS — Z01.419 ENCOUNTER FOR WELL WOMAN EXAM WITH ROUTINE GYNECOLOGICAL EXAM: Primary | ICD-10-CM

## 2022-01-24 PROCEDURE — S0612 ANNUAL GYNECOLOGICAL EXAMINA: HCPCS | Performed by: OBSTETRICS & GYNECOLOGY

## 2022-01-24 PROCEDURE — G0476 HPV COMBO ASSAY CA SCREEN: HCPCS | Performed by: OBSTETRICS & GYNECOLOGY

## 2022-01-24 PROCEDURE — G0145 SCR C/V CYTO,THINLAYER,RESCR: HCPCS | Performed by: OBSTETRICS & GYNECOLOGY

## 2022-01-24 NOTE — PROGRESS NOTES
ASSESSMENT & PLAN: Anny Melissa is a 50 y o  [id number] with normal gynecologic exam     1   Routine well woman exam done today  2  Pap and HPV:  The patient's last pap and hpv was 2020  It was normal   Pap and cotesting was done today  Current ASCCP Guidelines reviewed  Requesting annual paps  3  Mammogram ordered  4  The following were reviewed in today's visit: breast self exam  5  Sees Dr Sneha Morris - [facility]    CC:  Annual Gynecologic Examination    HPI: Anny Melissa is a 50 y o  [id number]  who presents for annual gynecologic examination  She has the following concerns:  Reports hot flashes and night sweats, no bleeding since ablation  Will check labs  Health Maintenance:    She wears her seatbelt routinely  She does perform regular monthly self breast exams  She feels safe at home       Patient Active Problem List   Diagnosis    History of right breast cancer    Chronic fatigue    Dermatitis    GERD (gastroesophageal reflux disease)    Greater trochanteric bursitis of right hip    Pain, pelvic, female    Seasonal allergies    Encounter for follow-up examination after completed treatment for malignant neoplasm    Pain of left breast    Family history of cancer    Heberden's node    Hallux rigidus of left foot       Past Medical History:   Diagnosis Date    Allergic rhinitis     Arthritis     Osteoarthritis in right hand    Benign paroxysmal vertigo     BRCA1 negative     BRCA2 negative     Breast cancer ([address] ) 2014    rt breast    Bunion, left foot     OR correction today 6/25/2021    Cancer ([address] ) 8/11/2014    Dcis rt breast    Fracture of phalanx of toe 07/21/2015    Last assessed    History of radiation exposure     Tendinitis 04/13/2015    Last assessed    Wears glasses        Past Surgical History:   Procedure Laterality Date    BREAST LUMPECTOMY W/ NEEDLE LOCALIZATION Right 09/23/2014    Dr Sara Nam / Justice Rosario  MD HALLUX RIGIDUS W/CHEILECTOMY 1ST MP JT W/IMPLT Left 2021    Procedure: IMPLANT ARTHROPLASTY;  Surgeon: Reba Oshea DPM;  Location: [facility];  Service: Podiatry       Past OB/Gyn History:  OB History        3    Para   3    Term   3            AB        Living   3       SAB        IAB        Ectopic        Multiple        Live Births   3                  Pt does not have menstrual issues     History of sexually transmitted infection: No   History of abnormal pap smears: No      Patient is currently sexually active  heterosexual  The current method of family planning is none  Family History   Problem Relation Age of Onset    Pancreatic cancer Maternal Grandfather     Cancer Maternal Grandfather     Lung cancer Paternal Grandmother     Cancer Paternal Grandmother         Lung    No Known Problems Mother     Heart disease Father     Diabetes Son     Breast cancer Family     Diabetes Family     No Known Problems Maternal Grandmother     No Known Problems Paternal Grandfather     No Known Problems Maternal Aunt     No Known Problems Maternal Aunt     No Known Problems Maternal Aunt     No Known Problems Paternal Aunt        Social History:  Social History     Socioeconomic History    Marital status: /Civil Union     Spouse name: Not on file    Number of children: Not on file    Years of education: Not on file    Highest education level: Not on file   Occupational History    Not on file   Tobacco Use    Smoking status: Never Smoker    Smokeless tobacco: Never Used   Vaping Use    Vaping Use: Never used   Substance and Sexual Activity    Alcohol use:  Yes     Alcohol/week: 1 0 standard drink     Types: 1 Standard drinks or equivalent per week     Comment: social   1 x weekly    Drug use: No    Sexual activity: Yes     Partners: Male     Birth control/protection: Male Sterilization   Other Topics Concern    Not on file   Social History Narrative    Not on file     Social Determinants of Health     Financial Resource Strain: Not on file   Food Insecurity: Not on file   Transportation Needs: Not on file   Physical Activity: Not on file   Stress: Not on file   Social Connections: Not on file   Intimate Partner Violence: Not on file   Housing Stability: Not on file        Allergies   Allergen Reactions    Sulfa Antibiotics Rash       Current Outpatient Medications:     loratadine (CLARITIN) 10 mg tablet, Take 10 mg by mouth daily, Disp: , Rfl:     meloxicam (Mobic) 15 mg tablet, Take 1 tablet (15 mg total) by mouth daily, Disp: 90 tablet, Rfl: 0    Multiple Vitamin (DAILY VALUE MULTIVITAMIN PO), Take 1 tablet by mouth daily, Disp: , Rfl:     famotidine (PEPCID) 10 mg tablet, Take 1 tablet (10 mg total) by mouth 2 (two) times a day for 5 days (Patient not taking: Reported on 1/10/2022 ), Disp: 10 tablet, Rfl: 0    fluticasone (FLONASE) 50 mcg/act nasal spray, INHALE 2 SPRAYS VIA EACH NOSTRIL ONCE DAILY (Patient not taking: Reported on 1/10/2022), Disp: 48 g, Rfl: 0    meloxicam (MOBIC) 15 mg tablet, Take 1 tablet (15 mg total) by mouth daily (Patient not taking: Reported on 1/10/2022 ), Disp: 90 tablet, Rfl: 0    meloxicam (MOBIC) 15 mg tablet, Take 1 tablet (15 mg total) by mouth daily, Disp: 90 tablet, Rfl: 0    predniSONE 10 mg tablet, Take 5 tabs po x 2 days; 4 tabs po x 2 days; 3 tabs po x 1 day; 2 tabs po x 1 day  1 tab po x 1 day   (Patient not taking: Reported on 1/10/2022 ), Disp: 24 tablet, Rfl: 0    scopolamine (TRANSDERM-SCOP) 1 5 mg/3 days TD 72 hr patch, Place 1 patch behind ear at least 3 hours prior to airline flight, may leave on for 72 hours for secondary flights same-day (Patient not taking: Reported on 12/11/2021 ), Disp: 6 patch, Rfl: 1    Review of Systems:    Review of Systems  Constitutional :no fever, feels well, no tiredness, no recent weight gain or loss  ENT: no ear ache, no loss of hearing, no nosebleeds or nasal discharge, no sore throat or hoarseness  Cardiovascular: no complaints of slow or fast heart beat, no chest pain, no palpitations, no leg claudication or lower extremity edema  Respiratory: no complaints of shortness of shortness of breath, no JEFF  Breasts:no complaints of breast pain, breast lump, or nipple discharge  Gastrointestinal: no complaints of abdominal pain, constipation, nausea, vomiting, or diarrhea or bloody stools  Genitourinary : no complaints of dysuria, incontinence, pelvic pain, no dysmenorrhea, vaginal discharge or abnormal vaginal bleeding and as noted in HPI  Musculoskeletal: no complaints of arthralgia, no myalgia, no joint swelling or stiffness, no limb pain or swelling  Integumentary: no complaints of skin rash or lesion, itching or dry skin  Neurological: no complaints of headache, no confusion, no numbness or tingling, no dizziness or fainting    Objective      /60 (BP Location: Left arm, Patient Position: Sitting, Cuff Size: Adult)   Ht 5' 3" (1 6 m)   Wt 76 7 kg (169 lb)   BMI 29 94 kg/m²     General:   appears stated age, cooperative, alert normal mood and affect   Neck: normal, supple,trachea midline, no masses   Heart: regular rate and rhythm, S1, S2 normal, no murmur, click, rub or gallop   Lungs: clear to auscultation bilaterally   Breasts: normal appearance, no masses or tenderness, Inspection negative, No nipple retraction or dimpling, No nipple discharge or bleeding, No axillary or supraclavicular adenopathy, Normal to palpation without dominant masses, scar from prior biopsy noted right breast   Abdomen: soft, non-tender, without masses or organomegaly   Vulva: normal, normal female genitalia, Bartholin's, Urethra, [place] normal, no lesions, normal female hair distribution   Vagina: normal vagina, no discharge, exudate, lesion, or erythema   Urethra: normal   Cervix: Normal, no discharge  PAP done     Uterus: normal size, contour, position, consistency, mobility, non-tender   Adnexa: normal adnexa   Lymphatic palpation of lymph nodes in neck, axilla, groin and/or other locations: no lymphadenopathy or masses noted   Skin normal skin turgor and no rashes     Psychiatric orientation to person, place, and time: normal  mood and affect: normal

## 2022-01-28 LAB
LAB AP GYN PRIMARY INTERPRETATION: NORMAL
Lab: NORMAL

## 2022-02-10 ENCOUNTER — HOSPITAL ENCOUNTER (OUTPATIENT)
Dept: ULTRASOUND IMAGING | Facility: CLINIC | Age: 52
Discharge: HOME | End: 2022-02-10
Payer: COMMERCIAL

## 2022-02-10 VITALS — HEIGHT: 63 IN | BODY MASS INDEX: 29.96 KG/M2 | WEIGHT: 169.09 LBS

## 2022-02-10 DIAGNOSIS — R92.8 ABNORMAL FINDING ON BREAST IMAGING: ICD-10-CM

## 2022-02-10 PROCEDURE — 76642 ULTRASOUND BREAST LIMITED: CPT

## 2022-03-22 ENCOUNTER — TELEPHONE (OUTPATIENT)
Dept: PODIATRY | Facility: CLINIC | Age: 52
End: 2022-03-22

## 2022-03-22 NOTE — TELEPHONE ENCOUNTER
I will forward this to the doctor  Medication request must be sent to the specific provider as well, not just the clinical pool  The doctor must approve and provide medication orders and instructions

## 2022-03-22 NOTE — TELEPHONE ENCOUNTER
Patient called  She needs a refill of meloxicam (MOBIC) 15 mg tablet      Can we call this in to her pharmacy for her? Thank you

## 2022-03-23 DIAGNOSIS — M20.22 HALLUX RIGIDUS OF LEFT FOOT: Primary | ICD-10-CM

## 2022-03-23 RX ORDER — MELOXICAM 15 MG/1
15 TABLET ORAL DAILY
Qty: 90 TABLET | Refills: 0 | Status: SHIPPED | OUTPATIENT
Start: 2022-03-23 | End: 2022-08-08

## 2022-03-25 ENCOUNTER — LAB (OUTPATIENT)
Dept: LAB | Facility: HOSPITAL | Age: 52
End: 2022-03-25
Attending: OBSTETRICS & GYNECOLOGY
Payer: COMMERCIAL

## 2022-03-25 DIAGNOSIS — N95.1 MENOPAUSAL SYMPTOMS: ICD-10-CM

## 2022-03-25 LAB
ESTRADIOL SERPL-MCNC: 237 PG/ML
FSH SERPL-ACNC: 6.3 MIU/ML

## 2022-03-25 PROCEDURE — 82670 ASSAY OF TOTAL ESTRADIOL: CPT

## 2022-03-25 PROCEDURE — 83001 ASSAY OF GONADOTROPIN (FSH): CPT

## 2022-03-25 PROCEDURE — 36415 COLL VENOUS BLD VENIPUNCTURE: CPT

## 2022-06-06 ENCOUNTER — APPOINTMENT (OUTPATIENT)
Dept: LAB | Facility: HOSPITAL | Age: 52
End: 2022-06-06

## 2022-06-06 DIAGNOSIS — Z00.8 HEALTH EXAMINATION IN POPULATION SURVEY: ICD-10-CM

## 2022-06-06 LAB
CHOLEST SERPL-MCNC: 200 MG/DL
EST. AVERAGE GLUCOSE BLD GHB EST-MCNC: 103 MG/DL
HBA1C MFR BLD: 5.2 %
HDLC SERPL-MCNC: 50 MG/DL
LDLC SERPL CALC-MCNC: 101 MG/DL (ref 0–100)
NONHDLC SERPL-MCNC: 150 MG/DL
TRIGL SERPL-MCNC: 246 MG/DL

## 2022-06-06 PROCEDURE — 83036 HEMOGLOBIN GLYCOSYLATED A1C: CPT

## 2022-06-06 PROCEDURE — 80061 LIPID PANEL: CPT

## 2022-06-06 PROCEDURE — 36415 COLL VENOUS BLD VENIPUNCTURE: CPT

## 2022-08-03 ENCOUNTER — HOSPITAL ENCOUNTER (OUTPATIENT)
Dept: MAMMOGRAPHY | Facility: HOSPITAL | Age: 52
Discharge: HOME | End: 2022-08-03
Payer: COMMERCIAL

## 2022-08-03 VITALS — WEIGHT: 169.09 LBS | BODY MASS INDEX: 29.96 KG/M2 | HEIGHT: 63 IN

## 2022-08-03 DIAGNOSIS — Z12.31 ENCOUNTER FOR SCREENING MAMMOGRAM FOR MALIGNANT NEOPLASM OF BREAST: ICD-10-CM

## 2022-08-03 PROCEDURE — 77067 SCR MAMMO BI INCL CAD: CPT

## 2022-08-03 PROCEDURE — 77063 BREAST TOMOSYNTHESIS BI: CPT

## 2022-08-08 ENCOUNTER — OFFICE VISIT (OUTPATIENT)
Dept: FAMILY MEDICINE CLINIC | Facility: CLINIC | Age: 52
End: 2022-08-08
Payer: COMMERCIAL

## 2022-08-08 VITALS
WEIGHT: 172.8 LBS | HEIGHT: 63 IN | BODY MASS INDEX: 30.62 KG/M2 | DIASTOLIC BLOOD PRESSURE: 68 MMHG | TEMPERATURE: 98 F | HEART RATE: 84 BPM | OXYGEN SATURATION: 97 % | SYSTOLIC BLOOD PRESSURE: 122 MMHG

## 2022-08-08 DIAGNOSIS — M25.50 ARTHRALGIA, UNSPECIFIED JOINT: Primary | ICD-10-CM

## 2022-08-08 PROCEDURE — 99214 OFFICE O/P EST MOD 30 MIN: CPT | Performed by: PHYSICIAN ASSISTANT

## 2022-08-08 RX ORDER — MELOXICAM 7.5 MG/1
7.5 TABLET ORAL DAILY
Qty: 30 TABLET | Refills: 1 | Status: SHIPPED | OUTPATIENT
Start: 2022-08-08 | End: 2022-10-24 | Stop reason: SDUPTHER

## 2022-08-08 NOTE — PROGRESS NOTES
Assessment/Plan:    Problem List Items Addressed This Visit    None     Visit Diagnoses     Arthralgia, unspecified joint    -  Primary    Relevant Medications    meloxicam (Mobic) 7 5 mg tablet           Diagnoses and all orders for this visit:    Arthralgia, unspecified joint  -     meloxicam (Mobic) 7 5 mg tablet; Take 1 tablet (7 5 mg total) by mouth daily      Will try restarting meloxicam at a lower dose, 7 5 daily, and to try taking it with largest meal  Hopefully is able to tolerate this better  Subjective:      Patient ID: Debbie Bolivar is a 50 y o  female  Maame Michaele is here today complaining of pain in hands and feet  Was taking meloxicam 15 mg daily x 1 year which was working quite well, however started to cause GI upset  She ate with breakfast which only consisted of a banana or maybe a protein bar, never tried to take it with a full meal  After developed GI upset, stopped taking it and now joint pain has returned  The following portions of the patient's history were reviewed and updated as appropriate:   She has a past medical history of Allergic rhinitis, Arthritis, Benign paroxysmal vertigo, BRCA1 negative, BRCA2 negative, Breast cancer ([facility] [address] ) (2014), Bunion, left foot, Cancer ([facility] [address] ) (8/11/2014), Fracture of phalanx of toe (07/21/2015), History of radiation exposure, Tendinitis (04/13/2015), and Wears glasses  ,  does not have any pertinent problems on file  ,   has a past surgical history that includes Breast lumpectomy w/ needle localization (Right, 09/23/2014); Colposcopy w/ biopsy / curettage; Endometrial ablation; and pr hallux rigidus w/cheilectomy 1st mp jt w/implt (Left, 6/25/2021)  ,  family history includes Breast cancer in her family; Cancer in her maternal grandfather and paternal grandmother; Diabetes in her family and son;  Heart disease in her father; Lung cancer in her paternal grandmother; No Known Problems in her maternal aunt, maternal aunt, maternal aunt, maternal grandmother, mother, paternal aunt, paternal grandfather, son, and son; Pancreatic cancer in her maternal grandfather  ,   reports that she has never smoked  She has never used smokeless tobacco  She reports current alcohol use of about 1 0 standard drink of alcohol per week  She reports that she does not use drugs  ,  is allergic to sulfa antibiotics     Current Outpatient Medications   Medication Sig Dispense Refill    loratadine (CLARITIN) 10 mg tablet Take 10 mg by mouth daily      meloxicam (Mobic) 7 5 mg tablet Take 1 tablet (7 5 mg total) by mouth daily 30 tablet 1    Multiple Vitamin (DAILY VALUE MULTIVITAMIN PO) Take 1 tablet by mouth daily      famotidine (PEPCID) 10 mg tablet Take 1 tablet (10 mg total) by mouth 2 (two) times a day for 5 days (Patient not taking: Reported on 1/10/2022 ) 10 tablet 0    fluticasone (FLONASE) 50 mcg/act nasal spray INHALE 2 SPRAYS VIA EACH NOSTRIL ONCE DAILY (Patient not taking: No sig reported) 48 g 0    predniSONE 10 mg tablet Take 5 tabs po x 2 days; 4 tabs po x 2 days; 3 tabs po x 1 day; 2 tabs po x 1 day  1 tab po x 1 day  (Patient not taking: No sig reported) 24 tablet 0    scopolamine (TRANSDERM-SCOP) 1 5 mg/3 days TD 72 hr patch Place 1 patch behind ear at least 3 hours prior to airline flight, may leave on for 72 hours for secondary flights same-day (Patient not taking: No sig reported) 6 patch 1     No current facility-administered medications for this visit  Review of Systems   Constitutional: Negative for activity change, appetite change, chills, diaphoresis, fatigue, fever and unexpected weight change  HENT: Negative for congestion, ear pain, postnasal drip, rhinorrhea, sinus pressure, sinus pain, sneezing, sore throat, tinnitus and voice change  Eyes: Negative for pain, redness and visual disturbance  Respiratory: Negative for cough, chest tightness, shortness of breath and wheezing      Cardiovascular: Negative for chest pain, palpitations and leg swelling  Gastrointestinal: Negative for abdominal pain, blood in stool, constipation, diarrhea, nausea and vomiting  Genitourinary: Negative for difficulty urinating, dysuria, frequency, hematuria and urgency  Musculoskeletal: Positive for arthralgias  Negative for back pain, gait problem, joint swelling, myalgias, neck pain and neck stiffness  Skin: Negative for color change, pallor, rash and wound  Neurological: Negative for dizziness, tremors, weakness, light-headedness and headaches  Psychiatric/Behavioral: Negative for dysphoric mood, self-injury, sleep disturbance and suicidal ideas  The patient is not nervous/anxious  Objective:  Vitals:    08/08/22 1457   BP: 122/68   Pulse: 84   Temp: 98 °F (36 7 °C)   SpO2: 97%   Weight: 78 4 kg (172 lb 12 8 oz)   Height: 5' 3" (1 6 m)     Body mass index is 30 61 kg/m²  Physical Exam  Vitals reviewed  Constitutional:       General: She is not in acute distress  Appearance: She is well-developed  She is not diaphoretic  HENT:      Head: Normocephalic and atraumatic  Right Ear: Hearing, tympanic membrane, ear canal and external ear normal       Left Ear: Hearing, tympanic membrane, ear canal and external ear normal       Mouth/Throat:      Pharynx: Uvula midline  No oropharyngeal exudate  Eyes:      General: No scleral icterus  Right eye: No discharge  Left eye: No discharge  Conjunctiva/sclera: Conjunctivae normal    Neck:      Thyroid: No thyromegaly  Vascular: No carotid bruit  Cardiovascular:      Rate and Rhythm: Normal rate and regular rhythm  Heart sounds: Normal heart sounds  No murmur heard  Pulmonary:      Effort: Pulmonary effort is normal  No respiratory distress  Breath sounds: Normal breath sounds  No wheezing  Abdominal:      General: Bowel sounds are normal  There is no distension  Palpations: Abdomen is soft  There is no mass  Tenderness:  There is no abdominal tenderness  There is no guarding or rebound  Musculoskeletal:         General: No tenderness  Normal range of motion  Cervical back: Neck supple  Lymphadenopathy:      Cervical: No cervical adenopathy  Skin:     General: Skin is warm and dry  Findings: No erythema or rash  Neurological:      Mental Status: She is alert and oriented to person, place, and time  Psychiatric:         Behavior: Behavior normal          Thought Content:  Thought content normal          Judgment: Judgment normal

## 2022-08-11 ENCOUNTER — TELEPHONE (OUTPATIENT)
Dept: SURGICAL ONCOLOGY | Facility: CLINIC | Age: 52
End: 2022-08-11

## 2022-08-11 ENCOUNTER — TELEPHONE (OUTPATIENT)
Dept: HEMATOLOGY ONCOLOGY | Facility: CLINIC | Age: 52
End: 2022-08-11

## 2022-08-11 ENCOUNTER — HOSPITAL ENCOUNTER (OUTPATIENT)
Dept: MAMMOGRAPHY | Facility: HOSPITAL | Age: 52
Discharge: HOME | End: 2022-08-11
Payer: COMMERCIAL

## 2022-08-11 ENCOUNTER — HOSPITAL ENCOUNTER (OUTPATIENT)
Dept: ULTRASOUND IMAGING | Facility: HOSPITAL | Age: 52
Discharge: HOME | End: 2022-08-11
Payer: COMMERCIAL

## 2022-08-11 VITALS — HEIGHT: 63 IN | BODY MASS INDEX: 30.62 KG/M2 | WEIGHT: 172.84 LBS

## 2022-08-11 DIAGNOSIS — R92.8 ABNORMAL MAMMOGRAM: ICD-10-CM

## 2022-08-11 PROCEDURE — 77065 DX MAMMO INCL CAD UNI: CPT

## 2022-08-11 PROCEDURE — G0279 TOMOSYNTHESIS, MAMMO: HCPCS

## 2022-08-11 PROCEDURE — 76642 ULTRASOUND BREAST LIMITED: CPT

## 2022-08-11 NOTE — TELEPHONE ENCOUNTER
lvm for patient to r/s 9/28 appt with charla at [facility] [place]   Patient can either see Beulah Michele at [facility] for a surv visit or they can see Jennifer Winchester at [facility] for a f/u

## 2022-08-11 NOTE — TELEPHONE ENCOUNTER
Appointment Cancellation Or Reschedule     Person calling in Patient    Provider [address] [place] [address]   Office Visit Date and Time 09/22 2PM   Office Visit Location [facility]   Did patient want to reschedule their office appointment? If so, when was it scheduled to? Yes 09/27 2:30PM [facility]   Is this patient calling to reschedule an infusion appointment? No   When is their next infusion appointment? N/A   Is this patient a Chemo patient? No   Reason for Cancellation or Reschedule Provider requested - ok'd to switch to [facility]     If the patient is a treatment patient, please route this to the office nurse  If the patient is not on treatment, please route to the office MA  If the patient is a surgical oncology patient, please route to surg/onc clinical pool

## 2022-09-19 ENCOUNTER — TELEPHONE (OUTPATIENT)
Dept: GASTROENTEROLOGY | Facility: CLINIC | Age: 52
End: 2022-09-19

## 2022-09-19 ENCOUNTER — CONSULT (OUTPATIENT)
Dept: GASTROENTEROLOGY | Facility: CLINIC | Age: 52
End: 2022-09-19
Payer: COMMERCIAL

## 2022-09-19 VITALS
SYSTOLIC BLOOD PRESSURE: 114 MMHG | WEIGHT: 175 LBS | RESPIRATION RATE: 16 BRPM | BODY MASS INDEX: 31.01 KG/M2 | HEART RATE: 86 BPM | HEIGHT: 63 IN | TEMPERATURE: 98.4 F | DIASTOLIC BLOOD PRESSURE: 78 MMHG | OXYGEN SATURATION: 99 %

## 2022-09-19 DIAGNOSIS — K21.9 GASTROESOPHAGEAL REFLUX DISEASE, UNSPECIFIED WHETHER ESOPHAGITIS PRESENT: Primary | ICD-10-CM

## 2022-09-19 DIAGNOSIS — Z12.11 SCREEN FOR COLON CANCER: ICD-10-CM

## 2022-09-19 DIAGNOSIS — Z11.59 ENCOUNTER FOR HEPATITIS C SCREENING TEST FOR LOW RISK PATIENT: ICD-10-CM

## 2022-09-19 DIAGNOSIS — R19.7 INTERMITTENT DIARRHEA: ICD-10-CM

## 2022-09-19 DIAGNOSIS — Z12.11 COLON CANCER SCREENING: ICD-10-CM

## 2022-09-19 PROCEDURE — 99244 OFF/OP CNSLTJ NEW/EST MOD 40: CPT | Performed by: INTERNAL MEDICINE

## 2022-09-19 RX ORDER — LIDOCAINE HYDROCHLORIDE 20 MG/ML
15 SOLUTION OROPHARYNGEAL ONCE
Status: CANCELLED | OUTPATIENT
Start: 2022-09-19 | End: 2022-09-19

## 2022-09-19 NOTE — TELEPHONE ENCOUNTER
Scheduled date of EGD/colonoscopy (as of today): 10/11/2022  Physician performing EGD/colonoscopy: Minissdanielle  Location of EGD/colonoscopy: [facility]  Desired bowel prep reviewed with patient: Golytely  Instructions reviewed with patient by: Josiane  Clearances:  none

## 2022-09-19 NOTE — PROGRESS NOTES
[address] [facility]  Gastroenterology Outpatient Consultation  Patient Marifer Hurst   Age 50 y o  Gender female   MRN: [id number]  [facility] [id number]     ASSESSMENT AND PLAN:   Problem List Items Addressed This Visit        Digestive    GERD (gastroesophageal reflux disease) - Primary     Henrry Strong has been having intermittent heartburn and reflux symptoms dating back several years  She is not taking any medication for this  She does use some antacids  There has been no nocturnal symptoms  She denies any dysphagia, melena, early satiety, or unintentional weight loss  Given the infrequency of her symptoms, I feel she could start out with an H2 blocker such as famotidine 40 mg 2 hours before bed  If this does not help, we could try a proton pump inhibitor such as low-dose omeprazole 20 mg daily  I did also suggest pursuing upper endoscopy at time of her colonoscopy  Lifestyle modifications for gastroesophageal reflux disease were discussed and include limiting fried and fatty foods, mints, chocolates, carbonated and caffeinated beverages , and alcohol, etc   Avoid lying down for 2-3 hours after meals  If you have nighttime symptoms consider raising the head of the bed up on 4-6 inch blocks  Pillows typically are not useful  If you are overweight, weight loss will be helpful  I explained to the patient the procedure of upper endoscopy as well as its potential risk which are approximately 1 in 1000 chance of bleeding, infection, and perforation  Relevant Orders    EGD       Other    Intermittent diarrhea     Henrry Strong does report some intermittent diarrhea dating back probably 30 years  This is not become more frequent or progressive  It has been difficult for her to identify any particular food triggers  Stress may also exacerbate this  I suspect she may have irritable bowel syndrome with component of dietary intolerance  For completeness check fecal elastase and fecal fat    Check celiac antibody panel  Check CBC  Check chemistry complete  Check C reactive protein  I did suggested she limit her meloxicam use and try not to take this on a daily basis  Look for alternatives to treatment for her arthritis  Avoid all NSAIDs  I also suggested that she try lactose-free diet and maybe use Lactaid milk for about 3-4 weeks to see if this improves her symptoms  She may also want to stop drinking diet green tea in all diet beverages  Avoid artificial sweeteners  Avoid foods and beverages that contain high fructose corn syrup as all these can be triggers for diarrhea  Consider the addition of a fiber supplement such as Benefiber 2 tsp fulls or Citrucel 1 heaping tbsp full mixed in 6-8 oz of non carbonated liquid daily  Relevant Orders    CBC    Celiac Disease Antibody Profile    Comprehensive metabolic panel    C-reactive protein    Vitamin D 25 hydroxy    TSH, 3rd generation with Free T4 reflex    Pancreatic elastase, fecal    Fecal fat, qualitative    Screen for colon cancer     Shai Baca is due for colon cancer screening  She will be scheduled for colonoscopy  She will receive a Colyte preparation in split dose with the 2nd dose completed 3 hours prior to arrival   Two bisacodyl tablets  I explained to the patient the procedure of colonoscopy as well as its potential risks which are approximately 3 in 1000 chance of bleeding, infection, and perforation  Perforation may require a surgeon to repair it  I also explained the small but significant chance of missing lesions with colonoscopy which has been reported to be about 5-10%           Relevant Medications    polyethylene glycol (COLYTE) 4000 mL solution    Other Relevant Orders    Colonoscopy    Encounter for hepatitis C screening test for low risk patient    Relevant Orders    Hepatitis C antibody         _____________________________________________________________    HPI:   Shai Baca is a delightful 53-year-old woman who I am seeing in the office in consultation request of her gyn provider  She sent her in for colonoscopy  Patient also reports that she does have some reflux problems  She reports that she may get heartburn maybe 2 to 3 times a month  Certain spicy type foods may trigger her heartburn  She typically will take a Tums or Rolaids  She denies any nocturnal symptoms  She denies any dysphagia nausea vomiting early satiety or unintentional weight loss  There has been no rectal bleeding or melena  There is no family history of esophageal or gastric cancer  She did start meloxicam about a year ago for arthritis, this did upset her stomach  She had a recent dose reduction  She does drink diet green tea all day long, maybe 3 or 4 glasses a day  She does occasionally use dairy products    Ekaterina De Jesus also reports that she has had episodic diarrhea dating back to her 35s  She may get some abdominal cramping and discomfort preceding diarrhea  This is somewhat unpredictable  For example spinach salad may trigger her to have diarrhea 1 day then another day 1 not  Stress can also cause this for her  Occasionally but rarely she will have some nocturnal symptoms  There has been no rectal bleeding or melena  Typically her bowel habits are about 1 bowel movement every 2-3 days  These episodes may occur a couple times a month  Mom has ulcerative colitis  Dad had colon polyps  There is no family members with colon cancer  She does not smoke tobacco   She may have 1-2 drinks per week      Allergies   Allergen Reactions    Sulfa Antibiotics Rash     Current Outpatient Medications   Medication Sig Dispense Refill    loratadine (CLARITIN) 10 mg tablet Take 10 mg by mouth daily      meloxicam (Mobic) 7 5 mg tablet Take 1 tablet (7 5 mg total) by mouth daily 30 tablet 1    Multiple Vitamin (DAILY VALUE MULTIVITAMIN PO) Take 1 tablet by mouth daily      polyethylene glycol (COLYTE) 4000 mL solution Take 4,000 mL by mouth once for 1 dose Take 4000 mL by mouth once for 1 dose  Use as directed 4000 mL 0    famotidine (PEPCID) 10 mg tablet Take 1 tablet (10 mg total) by mouth 2 (two) times a day for 5 days (Patient not taking: No sig reported) 10 tablet 0    fluticasone (FLONASE) 50 mcg/act nasal spray INHALE 2 SPRAYS VIA EACH NOSTRIL ONCE DAILY (Patient not taking: No sig reported) 48 g 0    predniSONE 10 mg tablet Take 5 tabs po x 2 days; 4 tabs po x 2 days; 3 tabs po x 1 day; 2 tabs po x 1 day  1 tab po x 1 day  (Patient not taking: No sig reported) 24 tablet 0    scopolamine (TRANSDERM-SCOP) 1 5 mg/3 days TD 72 hr patch Place 1 patch behind ear at least 3 hours prior to airline flight, may leave on for 72 hours for secondary flights same-day (Patient not taking: No sig reported) 6 patch 1     No current facility-administered medications for this visit       MEDICAL HISTORY:  Past Medical History:   Diagnosis Date    Allergic rhinitis     Arthritis     Osteoarthritis in right hand    Benign paroxysmal vertigo     BRCA1 negative     BRCA2 negative     Breast cancer ([facility] ) 2014    rt breast    Bunion, left foot     OR correction today 6/25/2021    [facility] 08/11/2014    Dcis rt breast    Fracture of phalanx of toe 07/21/2015    Last assessed    GERD (gastroesophageal reflux disease)     History of radiation exposure     Tendinitis 04/13/2015    Last assessed    Wears glasses      Past Surgical History:   Procedure Laterality Date    BREAST LUMPECTOMY W/ NEEDLE LOCALIZATION Right 09/23/2014    Dr Barbie Tyson / Prab[facility] Parks RIGIDUS W/CHEILECTOMY 1ST MP JT W/IMPLT Left 6/25/2021    Procedure: IMPLANT ARTHROPLASTY;  Surgeon: May Arredondo DPM;  Location: [facility];  Service: Podiatry     Social History     Substance and Sexual Activity   Alcohol Use Yes    Alcohol/week: 1 0 standard drink    Types: 1 Standard drinks or equivalent per week    Comment: social   1 x weekly Social History     Substance and Sexual Activity   Drug Use No     Social History     Tobacco Use   Smoking Status Never Smoker   Smokeless Tobacco Never Used     Family History   Problem Relation Age of Onset    Ulcerative colitis Mother     Heart disease Father     Arthritis Maternal Grandmother     Heart disease Maternal Grandmother     Pancreatic cancer Maternal Grandfather     Cancer Maternal Grandfather     Lung cancer Paternal Grandmother     Cancer Paternal Grandmother         Lung    No Known Problems Paternal Grandfather     Diabetes Son     No Known Problems Son     No Known Problems Son     No Known Problems Maternal Aunt     No Known Problems Maternal Aunt     No Known Problems Maternal Aunt     No Known Problems Paternal Aunt     Breast cancer Family     Diabetes Family        REVIEW OF SYSTEMS:  CONSTITUTIONAL: Denies any fever, chills, rigors, and weight loss  HEENT: No earache or tinnitus  Denies hearing loss or visual disturbances  CARDIOVASCULAR: No chest pain or palpitations  RESPIRATORY: Denies any cough, hemoptysis, shortness of breath or dyspnea on exertion  GASTROINTESTINAL: As noted in the History of Present Illness  GENITOURINARY: No problems with urination  Denies any hematuria or dysuria  NEUROLOGIC: No dizziness or vertigo, denies headaches  MUSCULOSKELETAL:  She does report joint pain   SKIN: Denies skin rashes or itching  ENDOCRINE: Denies excessive thirst  Denies intolerance to heat or cold  PSYCHOSOCIAL:  She does report some anxiety  Objective   Blood pressure 114/78, pulse 86, temperature 98 4 °F (36 9 °C), resp  rate 16, height 5' 3" (1 6 m), weight 79 4 kg (175 lb), SpO2 99 %  Body mass index is 31 kg/m²  PHYSICAL EXAM:   General Appearance: Alert, cooperative, no distress  HEENT: Normocephalic, atraumatic, anicteric     Neck: Supple, symmetrical, trachea midline  Lungs: Clear to auscultation bilaterally; no rales, rhonchi or wheezing; respirations unlabored   Heart: Regular rate and rhythm; no murmur, rub, or gallop  Abdomen: Soft, bowel sounds normal, non-tender, non-distended; no masses, there is no hepatosplenomegaly  No spider angiomas  Genitalia: Deferred   Rectal: Deferred   Extremities: No cyanosis, clubbing or edema   Skin: No jaundice, rashes, or lesions   Lymph nodes: No palpable cervical lymphadenopathy   Lab Results:   No visits with results within 2 Month(s) from this visit  Latest known visit with results is:   Appointment on 06/06/2022   Component Date Value    Cholesterol 06/06/2022 200     Triglycerides 06/06/2022 246 (A)    HDL, Direct 06/06/2022 50     LDL Calculated 06/06/2022 101 (A)    Non-HDL-Chol (CHOL-HDL) 06/06/2022 150     Hemoglobin A1C 06/06/2022 5 2     EAG 06/06/2022 103      Radiology Results:   No results found    Fariba Pizarro DO   09/19/22   Cc:

## 2022-09-19 NOTE — PATIENT INSTRUCTIONS
GERD (gastroesophageal reflux disease)  Megha Mckeon has been having intermittent heartburn and reflux symptoms dating back several years  She is not taking any medication for this  She does use some antacids  There has been no nocturnal symptoms  She denies any dysphagia, melena, early satiety, or unintentional weight loss  Given the infrequency of her symptoms, I feel she could start out with an H2 blocker such as famotidine 40 mg 2 hours before bed  If this does not help, we could try a proton pump inhibitor such as low-dose omeprazole 20 mg daily  I did also suggest pursuing upper endoscopy at time of her colonoscopy  Lifestyle modifications for gastroesophageal reflux disease were discussed and include limiting fried and fatty foods, mints, chocolates, carbonated and caffeinated beverages , and alcohol, etc   Avoid lying down for 2-3 hours after meals  If you have nighttime symptoms consider raising the head of the bed up on 4-6 inch blocks  Pillows typically are not useful  If you are overweight, weight loss will be helpful  I explained to the patient the procedure of upper endoscopy as well as its potential risk which are approximately 1 in 1000 chance of bleeding, infection, and perforation  Intermittent diarrhea  Megha Mckeon does report some intermittent diarrhea dating back probably 30 years  This is not become more frequent or progressive  It has been difficult for her to identify any particular food triggers  Stress may also exacerbate this  I suspect she may have irritable bowel syndrome with component of dietary intolerance  For completeness check fecal elastase and fecal fat  Check celiac antibody panel  Check CBC  Check chemistry complete  Check C reactive protein  I did suggested she limit her meloxicam use and try not to take this on a daily basis  Look for alternatives to treatment for her arthritis  Avoid all NSAIDs      I also suggested that she try lactose-free diet and maybe use Lactaid milk for about 3-4 weeks to see if this improves her symptoms  She may also want to stop drinking diet green tea in all diet beverages  Avoid artificial sweeteners  Avoid foods and beverages that contain high fructose corn syrup as all these can be triggers for diarrhea  Consider the addition of a fiber supplement such as Benefiber 2 tsp fulls or Citrucel 1 heaping tbsp full mixed in 6-8 oz of non carbonated liquid daily  Screen for colon cancer  Evita Perez is due for colon cancer screening  She will be scheduled for colonoscopy  She will receive a Colyte preparation in split dose with the 2nd dose completed 3 hours prior to arrival   Two bisacodyl tablets  I explained to the patient the procedure of colonoscopy as well as its potential risks which are approximately 3 in 1000 chance of bleeding, infection, and perforation  Perforation may require a surgeon to repair it  I also explained the small but significant chance of missing lesions with colonoscopy which has been reported to be about 5-10%

## 2022-09-19 NOTE — LETTER
September 19, 2022     Shavonne Starks MD  [address]    Patient: Mervat Rico   YOB: 1970   Date of Visit: 9/19/2022       Dear Dr Joi Navarro:    Thank you for referring Mervat Rico to me for evaluation  Below are my notes for this consultation  If you have questions, please do not hesitate to call me  I look forward to following your patient along with you  Sincerely,        Trever Acosta DO        CC: No Recipients  Trever Acosta DO  9/19/2022  8:56 AM  Incomplete  [facility]  Gastroenterology Outpatient Consultation  Patient Mervat Rico   Age 50 y o  Gender female   MRN: [id number]  [facility] [id number]     ASSESSMENT AND PLAN:   Problem List Items Addressed This Visit        Digestive    GERD (gastroesophageal reflux disease) - Primary     Evita Perez has been having intermittent heartburn and reflux symptoms dating back several years  She is not taking any medication for this  She does use some antacids  There has been no nocturnal symptoms  She denies any dysphagia, melena, early satiety, or unintentional weight loss  Given the infrequency of her symptoms, I feel she could start out with an H2 blocker such as famotidine 40 mg 2 hours before bed  If this does not help, we could try a proton pump inhibitor such as low-dose omeprazole 20 mg daily  I did also suggest pursuing upper endoscopy at time of her colonoscopy  Lifestyle modifications for gastroesophageal reflux disease were discussed and include limiting fried and fatty foods, mints, chocolates, carbonated and caffeinated beverages , and alcohol, etc   Avoid lying down for 2-3 hours after meals  If you have nighttime symptoms consider raising the head of the bed up on 4-6 inch blocks  Pillows typically are not useful  If you are overweight, weight loss will be helpful      I explained to the patient the procedure of upper endoscopy as well as its potential risk which are approximately 1 in 1000 chance of bleeding, infection, and perforation  Relevant Orders    EGD       Other    Intermittent diarrhea     Martina Pompa does report some intermittent diarrhea dating back probably 30 years  This is not become more frequent or progressive  It has been difficult for her to identify any particular food triggers  Stress may also exacerbate this  I suspect she may have irritable bowel syndrome with component of dietary intolerance  For completeness check fecal elastase and fecal fat  Check celiac antibody panel  Check CBC  Check chemistry complete  Check C reactive protein  I did suggested she limit her meloxicam use and try not to take this on a daily basis  Look for alternatives to treatment for her arthritis  Avoid all NSAIDs  I also suggested that she try lactose-free diet and maybe use Lactaid milk for about 3-4 weeks to see if this improves her symptoms  She may also want to stop drinking diet green tea in all diet beverages  Avoid artificial sweeteners  Avoid foods and beverages that contain high fructose corn syrup as all these can be triggers for diarrhea  Consider the addition of a fiber supplement such as Benefiber 2 tsp fulls or Citrucel 1 heaping tbsp full mixed in 6-8 oz of non carbonated liquid daily  Relevant Orders    CBC    Celiac Disease Antibody Profile    Comprehensive metabolic panel    C-reactive protein    Vitamin D 25 hydroxy    TSH, 3rd generation with Free T4 reflex    Pancreatic elastase, fecal    Fecal fat, qualitative    Screen for colon cancer     Martina Pompa is due for colon cancer screening  She will be scheduled for colonoscopy  She will receive a Colyte preparation in split dose with the 2nd dose completed 3 hours prior to arrival   Two bisacodyl tablets    I explained to the patient the procedure of colonoscopy as well as its potential risks which are approximately 3 in 1000 chance of bleeding, infection, and perforation  Perforation may require a surgeon to repair it  I also explained the small but significant chance of missing lesions with colonoscopy which has been reported to be about 5-10%  Relevant Medications    polyethylene glycol (COLYTE) 4000 mL solution    Other Relevant Orders    Colonoscopy    Encounter for hepatitis C screening test for low risk patient    Relevant Orders    Hepatitis C antibody         _____________________________________________________________    HPI:   Ekaterina De Jesus is a delightful 79-year-old woman who I am seeing in the office in consultation request of her gyn provider  She sent her in for colonoscopy  Patient also reports that she does have some reflux problems  She reports that she may get heartburn maybe 2 to 3 times a month  Certain spicy type foods may trigger her heartburn  She typically will take a Tums or Rolaids  She denies any nocturnal symptoms  She denies any dysphagia nausea vomiting early satiety or unintentional weight loss  There has been no rectal bleeding or melena  There is no family history of esophageal or gastric cancer  She did start meloxicam about a year ago for arthritis, this did upset her stomach  She had a recent dose reduction  She does drink diet green tea all day long, maybe 3 or 4 glasses a day  She does occasionally use dairy products    Ekaterina De Jesus also reports that she has had episodic diarrhea dating back to her 35s  She may get some abdominal cramping and discomfort preceding diarrhea  This is somewhat unpredictable  For example spinach salad may trigger her to have diarrhea 1 day then another day 1 not  Stress can also cause this for her  Occasionally but rarely she will have some nocturnal symptoms  There has been no rectal bleeding or melena  Typically her bowel habits are about 1 bowel movement every 2-3 days  These episodes may occur a couple times a month  Mom has ulcerative colitis  Dad had colon polyps    There is no family members with colon cancer  She does not smoke tobacco   She may have 1-2 drinks per week  Allergies   Allergen Reactions    Sulfa Antibiotics Rash     Current Outpatient Medications   Medication Sig Dispense Refill    loratadine (CLARITIN) 10 mg tablet Take 10 mg by mouth daily      meloxicam (Mobic) 7 5 mg tablet Take 1 tablet (7 5 mg total) by mouth daily 30 tablet 1    Multiple Vitamin (DAILY VALUE MULTIVITAMIN PO) Take 1 tablet by mouth daily      polyethylene glycol (COLYTE) 4000 mL solution Take 4,000 mL by mouth once for 1 dose Take 4000 mL by mouth once for 1 dose  Use as directed 4000 mL 0    famotidine (PEPCID) 10 mg tablet Take 1 tablet (10 mg total) by mouth 2 (two) times a day for 5 days (Patient not taking: No sig reported) 10 tablet 0    fluticasone (FLONASE) 50 mcg/act nasal spray INHALE 2 SPRAYS VIA EACH NOSTRIL ONCE DAILY (Patient not taking: No sig reported) 48 g 0    predniSONE 10 mg tablet Take 5 tabs po x 2 days; 4 tabs po x 2 days; 3 tabs po x 1 day; 2 tabs po x 1 day  1 tab po x 1 day  (Patient not taking: No sig reported) 24 tablet 0    scopolamine (TRANSDERM-SCOP) 1 5 mg/3 days TD 72 hr patch Place 1 patch behind ear at least 3 hours prior to airline flight, may leave on for 72 hours for secondary flights same-day (Patient not taking: No sig reported) 6 patch 1     No current facility-administered medications for this visit       MEDICAL HISTORY:  Past Medical History:   Diagnosis Date    Allergic rhinitis     Arthritis     Osteoarthritis in right hand    Benign paroxysmal vertigo     BRCA1 negative     BRCA2 negative     [facility] 2014    rt breast    Bunion, left foot     OR correction today 6/25/2021    [facility] ) 08/11/2014    Dcis rt breast    Fracture of phalanx of toe 07/21/2015    Last assessed    GERD (gastroesophageal reflux disease)     History of radiation exposure     Tendinitis 04/13/2015    Last assessed    Wears glasses      Past Surgical History:   Procedure Laterality Date    BREAST LUMPECTOMY W/ NEEDLE LOCALIZATION Right 09/23/2014    Dr Raiford Shone    COLPOSCOPY W/ BIOPSY / CURETTAGE      ENDOMETRIAL ABLATION      LA HALLUX RIGIDUS W/CHEILECTOMY 1ST MP JT W/IMPLT Left 6/25/2021    Procedure: IMPLANT ARTHROPLASTY;  Surgeon: Shelton Anne DPM;  Location: [facility];  Service: Podiatry     Social History     Substance and Sexual Activity   Alcohol Use Yes    Alcohol/week: 1 0 standard drink    Types: 1 Standard drinks or equivalent per week    Comment: social   1 x weekly     Social History     Substance and Sexual Activity   Drug Use No     Social History     Tobacco Use   Smoking Status Never Smoker   Smokeless Tobacco Never Used     Family History   Problem Relation Age of Onset    Ulcerative colitis Mother     Heart disease Father     Arthritis Maternal Grandmother     Heart disease Maternal Grandmother     Pancreatic cancer Maternal Grandfather     Cancer Maternal Grandfather     Lung cancer Paternal Grandmother     Cancer Paternal Grandmother         Lung    No Known Problems Paternal Grandfather     Diabetes Son     No Known Problems Son     No Known Problems Son     No Known Problems Maternal Aunt     No Known Problems Maternal Aunt     No Known Problems Maternal Aunt     No Known Problems Paternal Aunt     Breast cancer Family     Diabetes Family        REVIEW OF SYSTEMS:  CONSTITUTIONAL: Denies any fever, chills, rigors, and weight loss  HEENT: No earache or tinnitus  Denies hearing loss or visual disturbances  CARDIOVASCULAR: No chest pain or palpitations  RESPIRATORY: Denies any cough, hemoptysis, shortness of breath or dyspnea on exertion  GASTROINTESTINAL: As noted in the History of Present Illness  GENITOURINARY: No problems with urination  Denies any hematuria or dysuria  NEUROLOGIC: No dizziness or vertigo, denies headaches     MUSCULOSKELETAL:  She does report joint pain   SKIN: Denies skin rashes or itching  ENDOCRINE: Denies excessive thirst  Denies intolerance to heat or cold  PSYCHOSOCIAL:  She does report some anxiety  Objective   Blood pressure 114/78, pulse 86, temperature 98 4 °F (36 9 °C), resp  rate 16, height 5' 3" (1 6 m), weight 79 4 kg (175 lb), SpO2 99 %  Body mass index is 31 kg/m²  PHYSICAL EXAM:   General Appearance: Alert, cooperative, no distress  HEENT: Normocephalic, atraumatic, anicteric  Neck: Supple, symmetrical, trachea midline  Lungs: Clear to auscultation bilaterally; no rales, rhonchi or wheezing; respirations unlabored   Heart: Regular rate and rhythm; no murmur, rub, or gallop  Abdomen: Soft, bowel sounds normal, non-tender, non-distended; no masses, there is no hepatosplenomegaly  No spider angiomas  Genitalia: Deferred   Rectal: Deferred   Extremities: No cyanosis, clubbing or edema   Skin: No jaundice, rashes, or lesions   Lymph nodes: No palpable cervical lymphadenopathy   Lab Results:   No visits with results within 2 Month(s) from this visit  Latest known visit with results is:   Appointment on 06/06/2022   Component Date Value    Cholesterol 06/06/2022 200     Triglycerides 06/06/2022 246 (A)    HDL, Direct 06/06/2022 50     LDL Calculated 06/06/2022 101 (A)    Non-HDL-Chol (CHOL-HDL) 06/06/2022 150     Hemoglobin A1C 06/06/2022 5 2     EAG 06/06/2022 103      Radiology Results:   No results found    Alize Pizarro DO   09/19/22   Cc:

## 2022-09-19 NOTE — ASSESSMENT & PLAN NOTE
Eller Krabbe has been having intermittent heartburn and reflux symptoms dating back several years  She is not taking any medication for this  She does use some antacids  There has been no nocturnal symptoms  She denies any dysphagia, melena, early satiety, or unintentional weight loss  Given the infrequency of her symptoms, I feel she could start out with an H2 blocker such as famotidine 40 mg 2 hours before bed  If this does not help, we could try a proton pump inhibitor such as low-dose omeprazole 20 mg daily  I did also suggest pursuing upper endoscopy at time of her colonoscopy  Lifestyle modifications for gastroesophageal reflux disease were discussed and include limiting fried and fatty foods, mints, chocolates, carbonated and caffeinated beverages , and alcohol, etc   Avoid lying down for 2-3 hours after meals  If you have nighttime symptoms consider raising the head of the bed up on 4-6 inch blocks  Pillows typically are not useful  If you are overweight, weight loss will be helpful  I explained to the patient the procedure of upper endoscopy as well as its potential risk which are approximately 1 in 1000 chance of bleeding, infection, and perforation

## 2022-09-19 NOTE — ASSESSMENT & PLAN NOTE
Cesia Tomas is due for colon cancer screening  She will be scheduled for colonoscopy  She will receive a Colyte preparation in split dose with the 2nd dose completed 3 hours prior to arrival   Two bisacodyl tablets  I explained to the patient the procedure of colonoscopy as well as its potential risks which are approximately 3 in 1000 chance of bleeding, infection, and perforation  Perforation may require a surgeon to repair it  I also explained the small but significant chance of missing lesions with colonoscopy which has been reported to be about 5-10%

## 2022-09-19 NOTE — ASSESSMENT & PLAN NOTE
Eleazar Ledezma does report some intermittent diarrhea dating back probably 30 years  This is not become more frequent or progressive  It has been difficult for her to identify any particular food triggers  Stress may also exacerbate this  I suspect she may have irritable bowel syndrome with component of dietary intolerance  For completeness check fecal elastase and fecal fat  Check celiac antibody panel  Check CBC  Check chemistry complete  Check C reactive protein  I did suggested she limit her meloxicam use and try not to take this on a daily basis  Look for alternatives to treatment for her arthritis  Avoid all NSAIDs  I also suggested that she try lactose-free diet and maybe use Lactaid milk for about 3-4 weeks to see if this improves her symptoms  She may also want to stop drinking diet green tea in all diet beverages  Avoid artificial sweeteners  Avoid foods and beverages that contain high fructose corn syrup as all these can be triggers for diarrhea  Consider the addition of a fiber supplement such as Benefiber 2 tsp fulls or Citrucel 1 heaping tbsp full mixed in 6-8 oz of non carbonated liquid daily

## 2022-09-19 NOTE — H&P (VIEW-ONLY)
[address] Gastroenterology  Gastroenterology Outpatient Consultation  Patient Lissett Davila   Age 50 y o  Gender female   MRN: [id number]  [facility] [id number]     ASSESSMENT AND PLAN:   Problem List Items Addressed This Visit        Digestive    GERD (gastroesophageal reflux disease) - Primary     Chato English has been having intermittent heartburn and reflux symptoms dating back several years  She is not taking any medication for this  She does use some antacids  There has been no nocturnal symptoms  She denies any dysphagia, melena, early satiety, or unintentional weight loss  Given the infrequency of her symptoms, I feel she could start out with an H2 blocker such as famotidine 40 mg 2 hours before bed  If this does not help, we could try a proton pump inhibitor such as low-dose omeprazole 20 mg daily  I did also suggest pursuing upper endoscopy at time of her colonoscopy  Lifestyle modifications for gastroesophageal reflux disease were discussed and include limiting fried and fatty foods, mints, chocolates, carbonated and caffeinated beverages , and alcohol, etc   Avoid lying down for 2-3 hours after meals  If you have nighttime symptoms consider raising the head of the bed up on 4-6 inch blocks  Pillows typically are not useful  If you are overweight, weight loss will be helpful  I explained to the patient the procedure of upper endoscopy as well as its potential risk which are approximately 1 in 1000 chance of bleeding, infection, and perforation  Relevant Orders    EGD       Other    Intermittent diarrhea     Chato English does report some intermittent diarrhea dating back probably 30 years  This is not become more frequent or progressive  It has been difficult for her to identify any particular food triggers  Stress may also exacerbate this  I suspect she may have irritable bowel syndrome with component of dietary intolerance  For completeness check fecal elastase and fecal fat    Check celiac antibody panel  Check CBC  Check chemistry complete  Check C reactive protein  I did suggested she limit her meloxicam use and try not to take this on a daily basis  Look for alternatives to treatment for her arthritis  Avoid all NSAIDs  I also suggested that she try lactose-free diet and maybe use Lactaid milk for about 3-4 weeks to see if this improves her symptoms  She may also want to stop drinking diet green tea in all diet beverages  Avoid artificial sweeteners  Avoid foods and beverages that contain high fructose corn syrup as all these can be triggers for diarrhea  Consider the addition of a fiber supplement such as Benefiber 2 tsp fulls or Citrucel 1 heaping tbsp full mixed in 6-8 oz of non carbonated liquid daily  Relevant Orders    CBC    Celiac Disease Antibody Profile    Comprehensive metabolic panel    C-reactive protein    Vitamin D 25 hydroxy    TSH, 3rd generation with Free T4 reflex    Pancreatic elastase, fecal    Fecal fat, qualitative    Screen for colon cancer     Megha Mckeon is due for colon cancer screening  She will be scheduled for colonoscopy  She will receive a Colyte preparation in split dose with the 2nd dose completed 3 hours prior to arrival   Two bisacodyl tablets  I explained to the patient the procedure of colonoscopy as well as its potential risks which are approximately 3 in 1000 chance of bleeding, infection, and perforation  Perforation may require a surgeon to repair it  I also explained the small but significant chance of missing lesions with colonoscopy which has been reported to be about 5-10%           Relevant Medications    polyethylene glycol (COLYTE) 4000 mL solution    Other Relevant Orders    Colonoscopy    Encounter for hepatitis C screening test for low risk patient    Relevant Orders    Hepatitis C antibody         _____________________________________________________________    HPI:   Megha Mckeon is a delightful 82-year-old woman who I am seeing in the office in consultation request of her gyn provider  She sent her in for colonoscopy  Patient also reports that she does have some reflux problems  She reports that she may get heartburn maybe 2 to 3 times a month  Certain spicy type foods may trigger her heartburn  She typically will take a Tums or Rolaids  She denies any nocturnal symptoms  She denies any dysphagia nausea vomiting early satiety or unintentional weight loss  There has been no rectal bleeding or melena  There is no family history of esophageal or gastric cancer  She did start meloxicam about a year ago for arthritis, this did upset her stomach  She had a recent dose reduction  She does drink diet green tea all day long, maybe 3 or 4 glasses a day  She does occasionally use dairy products    Aparna Espino also reports that she has had episodic diarrhea dating back to her 35s  She may get some abdominal cramping and discomfort preceding diarrhea  This is somewhat unpredictable  For example spinach salad may trigger her to have diarrhea 1 day then another day 1 not  Stress can also cause this for her  Occasionally but rarely she will have some nocturnal symptoms  There has been no rectal bleeding or melena  Typically her bowel habits are about 1 bowel movement every 2-3 days  These episodes may occur a couple times a month  Mom has ulcerative colitis  Dad had colon polyps  There is no family members with colon cancer  She does not smoke tobacco   She may have 1-2 drinks per week      Allergies   Allergen Reactions   • Sulfa Antibiotics Rash     Current Outpatient Medications   Medication Sig Dispense Refill   • loratadine (CLARITIN) 10 mg tablet Take 10 mg by mouth daily     • meloxicam (Mobic) 7 5 mg tablet Take 1 tablet (7 5 mg total) by mouth daily 30 tablet 1   • Multiple Vitamin (DAILY VALUE MULTIVITAMIN PO) Take 1 tablet by mouth daily     • polyethylene glycol (COLYTE) 4000 mL solution Take 4,000 mL by mouth once for 1 dose Take 4000 mL by mouth once for 1 dose  Use as directed 4000 mL 0   • famotidine (PEPCID) 10 mg tablet Take 1 tablet (10 mg total) by mouth 2 (two) times a day for 5 days (Patient not taking: No sig reported) 10 tablet 0   • fluticasone (FLONASE) 50 mcg/act nasal spray INHALE 2 SPRAYS VIA EACH NOSTRIL ONCE DAILY (Patient not taking: No sig reported) 48 g 0   • predniSONE 10 mg tablet Take 5 tabs po x 2 days; 4 tabs po x 2 days; 3 tabs po x 1 day; 2 tabs po x 1 day  1 tab po x 1 day  (Patient not taking: No sig reported) 24 tablet 0   • scopolamine (TRANSDERM-SCOP) 1 5 mg/3 days TD 72 hr patch Place 1 patch behind ear at least 3 hours prior to airline flight, may leave on for 72 hours for secondary flights same-day (Patient not taking: No sig reported) 6 patch 1     No current facility-administered medications for this visit       MEDICAL HISTORY:  Past Medical History:   Diagnosis Date   • Allergic rhinitis    • Arthritis     Osteoarthritis in right hand   • Benign paroxysmal vertigo    • BRCA1 negative    • BRCA2 negative    • [facility] 2014    rt breast   • Bunion, left foot     OR correction today 6/25/2021   • Cancer ([facility] ) 08/11/2014    Dcis rt breast   • Fracture of phalanx of toe 07/21/2015    Last assessed   • GERD (gastroesophageal reflux disease)    • History of radiation exposure    • Tendinitis 04/13/2015    Last assessed   • Wears glasses      Past Surgical History:   Procedure Laterality Date   • BREAST LUMPECTOMY W/ NEEDLE LOCALIZATION Right 09/23/2014    Dr Hakeem Rollins   • COLPOSCOPY W/ BIOPSY / CURETTAGE     • ENDOMETRIAL ABLATION     • [place] HALLUX RIGIDUS W/CHEILECTOMY 1ST MP JT W/IMPLT Left 6/25/2021    Procedure: IMPLANT ARTHROPLASTY;  Surgeon: Drew Berkowitz DPM;  Location: [facility];  Service: Podiatry     Social History     Substance and Sexual Activity   Alcohol Use Yes   • Alcohol/week: 1 0 standard drink   • Types: 1 Standard drinks or equivalent per week    Comment: social   1 x weekly Social History     Substance and Sexual Activity   Drug Use No     Social History     Tobacco Use   Smoking Status Never Smoker   Smokeless Tobacco Never Used     Family History   Problem Relation Age of Onset   • Ulcerative colitis Mother    • Heart disease Father    • Arthritis Maternal Grandmother    • Heart disease Maternal Grandmother    • Pancreatic cancer Maternal Grandfather    • Cancer Maternal Grandfather    • Lung cancer Paternal Grandmother    • Cancer Paternal Grandmother         Lung   • No Known Problems Paternal Grandfather    • Diabetes Son    • No Known Problems Son    • No Known Problems Son    • No Known Problems Maternal Aunt    • No Known Problems Maternal Aunt    • No Known Problems Maternal Aunt    • No Known Problems Paternal Aunt    • Breast cancer Family    • Diabetes Family        REVIEW OF SYSTEMS:  CONSTITUTIONAL: Denies any fever, chills, rigors, and weight loss  HEENT: No earache or tinnitus  Denies hearing loss or visual disturbances  CARDIOVASCULAR: No chest pain or palpitations  RESPIRATORY: Denies any cough, hemoptysis, shortness of breath or dyspnea on exertion  GASTROINTESTINAL: As noted in the History of Present Illness  GENITOURINARY: No problems with urination  Denies any hematuria or dysuria  NEUROLOGIC: No dizziness or vertigo, denies headaches  MUSCULOSKELETAL:  She does report joint pain   SKIN: Denies skin rashes or itching  ENDOCRINE: Denies excessive thirst  Denies intolerance to heat or cold  PSYCHOSOCIAL:  She does report some anxiety  Objective   Blood pressure 114/78, pulse 86, temperature 98 4 °F (36 9 °C), resp  rate 16, height 5' 3" (1 6 m), weight 79 4 kg (175 lb), SpO2 99 %  Body mass index is 31 kg/m²  PHYSICAL EXAM:   General Appearance: Alert, cooperative, no distress  HEENT: Normocephalic, atraumatic, anicteric     Neck: Supple, symmetrical, trachea midline  Lungs: Clear to auscultation bilaterally; no rales, rhonchi or wheezing; respirations unlabored   Heart: Regular rate and rhythm; no murmur, rub, or gallop  Abdomen: Soft, bowel sounds normal, non-tender, non-distended; no masses, there is no hepatosplenomegaly  No spider angiomas  Genitalia: Deferred   Rectal: Deferred   Extremities: No cyanosis, clubbing or edema   Skin: No jaundice, rashes, or lesions   Lymph nodes: No palpable cervical lymphadenopathy   Lab Results:   No visits with results within 2 Month(s) from this visit  Latest known visit with results is:   Appointment on 06/06/2022   Component Date Value   • Cholesterol 06/06/2022 200    • Triglycerides 06/06/2022 246 (A)   • HDL, Direct 06/06/2022 50    • LDL Calculated 06/06/2022 101 (A)   • Non-HDL-Chol (CHOL-HDL) 06/06/2022 150    • Hemoglobin A1C 06/06/2022 5 2    • EAG 06/06/2022 103      Radiology Results:   No results found    Denzel Pizarro DO   09/19/22   Cc:

## 2022-09-27 ENCOUNTER — OFFICE VISIT (OUTPATIENT)
Dept: SURGICAL ONCOLOGY | Facility: CLINIC | Age: 52
End: 2022-09-27
Payer: COMMERCIAL

## 2022-09-27 VITALS
TEMPERATURE: 98.3 F | HEART RATE: 81 BPM | SYSTOLIC BLOOD PRESSURE: 124 MMHG | BODY MASS INDEX: 30.83 KG/M2 | HEIGHT: 63 IN | DIASTOLIC BLOOD PRESSURE: 78 MMHG | WEIGHT: 174 LBS | OXYGEN SATURATION: 98 % | RESPIRATION RATE: 16 BRPM

## 2022-09-27 DIAGNOSIS — Z12.31 VISIT FOR SCREENING MAMMOGRAM: ICD-10-CM

## 2022-09-27 DIAGNOSIS — Z85.3 HISTORY OF RIGHT BREAST CANCER: ICD-10-CM

## 2022-09-27 DIAGNOSIS — Z08 ENCOUNTER FOR FOLLOW-UP EXAMINATION AFTER COMPLETED TREATMENT FOR MALIGNANT NEOPLASM: Primary | ICD-10-CM

## 2022-09-27 PROCEDURE — 99213 OFFICE O/P EST LOW 20 MIN: CPT

## 2022-09-27 NOTE — PROGRESS NOTES
Surgical Oncology Follow Up       [address] [facility]  [facility] [place]  [address] [facility] [address]    Meln Manual  1970  [id number]  [address]  [facility] [place]  [address] [facility] [address]    Chief Complaint   Patient presents with    Follow-up       Assessment/Plan:  1  Encounter for follow-up examination after completed treatment for malignant neoplasm  - 6 month follow up    2  History of right breast cancer    3  Visit for screening mammogram  - Mammo screening bilateral w 3d & cad; Future       Discussion/Summary:  Patient is a 28-year-old female presenting today for 1 year follow-up for right breast cancer diagnosed in August 2014  Pathology revealed DCIS  She underwent a right lumpectomy and completed whole breast radiation therapy  She had genetic testing which was negative  She is currently on observation  She had a bilateral screening mammogram on 08/03/2022 which was benign for the right breast incomplete and required further imaging for the left  She had a diagnostic mammogram and ultrasound of the left breast on 08/11/2022 which noted benign cysts  There were no concerns on her breast exam  I will see the patient back in 1 year or sooner should the need arise  She was instructed to call with any questions or concerns prior to this time  All questions were answered today        History of Present Illness:     Oncology History   History of right breast cancer   8/25/2014 Initial Diagnosis    Ductal carcinoma in situ (DCIS) of right breast     9/23/2014 Surgery    Right lumpectomy (Dr Yon Tolbert)      - 12/16/2014 Radiation    WBRT (Dr Ahsan Pinto, [place], [place])     2015 Genetic Testing    BRCA1, BRCA2 negative          -Interval History   Patient is a 28-year-old female presenting today for 1 year follow-up for right breast cancer diagnosed in August 2014  She had a diagnostic mammogram and ultrasound of the left breast on 08/11/2022 which noted benign cysts  Patient denies changes on her breast exam  She denies persistent headaches, bone pain, back pain, shortness of breath, or abdominal pain  Review of Systems:  Review of Systems   Constitutional: Negative for activity change, appetite change, fatigue and unexpected weight change  Respiratory: Negative for cough and shortness of breath  Cardiovascular: Negative for chest pain  Gastrointestinal: Negative for abdominal pain, diarrhea, nausea and vomiting  Endocrine: Negative for heat intolerance  Musculoskeletal: Negative for arthralgias, back pain and myalgias  Skin: Negative for rash  Neurological: Negative for weakness and headaches  Hematological: Negative for adenopathy         Patient Active Problem List   Diagnosis    History of right breast cancer    Chronic fatigue    Dermatitis    GERD (gastroesophageal reflux disease)    Greater trochanteric bursitis of right hip    Pain, pelvic, female    Seasonal allergies    Encounter for follow-up examination after completed treatment for malignant neoplasm    Pain of left breast    Family history of cancer    Heberden's node    Hallux rigidus of left foot    Intermittent diarrhea    Screen for colon cancer    Encounter for hepatitis C screening test for low risk patient     Past Medical History:   Diagnosis Date    Allergic rhinitis     Arthritis     Osteoarthritis in right hand    Benign paroxysmal vertigo     BRCA1 negative     BRCA2 negative     Breast [facility] ([facility] ) 2014    rt breast    Bunion, left foot     OR correction today 6/25/2021    Cancer ([facility] ) 08/11/2014    Dcis rt breast    Fracture of phalanx of toe 07/21/2015    Last assessed    GERD (gastroesophageal reflux disease)     History of radiation exposure     Tendinitis 04/13/2015    Last assessed    Wears glasses      Past Surgical History:   Procedure Laterality Date    BREAST LUMPECTOMY W/ NEEDLE LOCALIZATION Right 09/23/2014    Dr Rosa Pimentel W/ BIOPSY / CURETTAGE      ENDOMETRIAL ABLATION      [place] HALLUX RIGIDUS W/CHEILECTOMY 1ST MP JT W/IMPLT Left 6/25/2021    Procedure: IMPLANT ARTHROPLASTY;  Surgeon: Cintia Vang DPM;  Location: [facility];  Service: Podiatry     Family History   Problem Relation Age of Onset    Ulcerative colitis Mother     Heart disease Father     Arthritis Maternal Grandmother     Heart disease Maternal Grandmother     Pancreatic cancer Maternal Grandfather     Cancer Maternal Grandfather     Lung cancer Paternal Grandmother     Cancer Paternal Grandmother         Lung    No Known Problems Paternal Grandfather     Diabetes Son     No Known Problems Son     No Known Problems Son     No Known Problems Maternal Aunt     No Known Problems Maternal Aunt     No Known Problems Maternal Aunt     No Known Problems Paternal Aunt     Breast cancer Family     Diabetes Family      Social History     Socioeconomic History    Marital status: /Civil Union     Spouse name: Not on file    Number of children: Not on file    Years of education: Not on file    Highest education level: Not on file   Occupational History    Not on file   Tobacco Use    Smoking status: Never Smoker    Smokeless tobacco: Never Used   Vaping Use    Vaping Use: Never used   Substance and Sexual Activity    Alcohol use:  Yes     Alcohol/week: 1 0 standard drink     Types: 1 Standard drinks or equivalent per week     Comment: social   1 x weekly    Drug use: No    Sexual activity: Yes     Partners: Male     Birth control/protection: Male Sterilization   Other Topics Concern    Not on file   Social History Narrative    Not on file     Social Determinants of Health     Financial Resource Strain: Not on file   Food Insecurity: Not on file   Transportation Needs: Not on file   Physical Activity: Not on file   Stress: Not on file Social Connections: Not on file   Intimate Partner Violence: Not on file   Housing Stability: Not on file       Current Outpatient Medications:     loratadine (CLARITIN) 10 mg tablet, Take 10 mg by mouth daily, Disp: , Rfl:     meloxicam (Mobic) 7 5 mg tablet, Take 1 tablet (7 5 mg total) by mouth daily, Disp: 30 tablet, Rfl: 1    Multiple Vitamin (DAILY VALUE MULTIVITAMIN PO), Take 1 tablet by mouth daily, Disp: , Rfl:     famotidine (PEPCID) 10 mg tablet, Take 1 tablet (10 mg total) by mouth 2 (two) times a day for 5 days (Patient not taking: No sig reported), Disp: 10 tablet, Rfl: 0    fluticasone (FLONASE) 50 mcg/act nasal spray, INHALE 2 SPRAYS VIA EACH NOSTRIL ONCE DAILY (Patient not taking: No sig reported), Disp: 48 g, Rfl: 0    polyethylene glycol (COLYTE) 4000 mL solution, Take 4,000 mL by mouth once for 1 dose Take 4000 mL by mouth once for 1 dose  Use as directed, Disp: 4000 mL, Rfl: 0    predniSONE 10 mg tablet, Take 5 tabs po x 2 days; 4 tabs po x 2 days; 3 tabs po x 1 day; 2 tabs po x 1 day  1 tab po x 1 day  (Patient not taking: No sig reported), Disp: 24 tablet, Rfl: 0    scopolamine (TRANSDERM-SCOP) 1 5 mg/3 days TD 72 hr patch, Place 1 patch behind ear at least 3 hours prior to airline flight, may leave on for 72 hours for secondary flights same-day (Patient not taking: No sig reported), Disp: 6 patch, Rfl: 1  Allergies   Allergen Reactions    Sulfa Antibiotics Rash     Vitals:    09/27/22 1419   BP: 124/78   Pulse: 81   Resp: 16   Temp: 98 3 °F (36 8 °C)   SpO2: 98%       Physical Exam  Constitutional:       General: She is not in acute distress  Appearance: Normal appearance  Cardiovascular:      Rate and Rhythm: Normal rate and regular rhythm  Pulses: Normal pulses  Heart sounds: Normal heart sounds  Pulmonary:      Effort: Pulmonary effort is normal       Breath sounds: Normal breath sounds  Chest:      Chest wall: No mass     Breasts:      Right: No swelling, bleeding, inverted nipple, mass, nipple discharge, skin change, tenderness, axillary adenopathy or supraclavicular adenopathy  Left: No swelling, bleeding, inverted nipple, mass, nipple discharge, skin change, tenderness, axillary adenopathy or supraclavicular adenopathy  Comments: Right breast lumpectomy scar  No masses, nodularity, skin changes, nipple changes or discharge, or adenopathy appreciated on physical exam      Abdominal:      General: Abdomen is flat  Palpations: Abdomen is soft  Lymphadenopathy:      Upper Body:      Right upper body: No supraclavicular, axillary or pectoral adenopathy  Left upper body: No supraclavicular, axillary or pectoral adenopathy  Skin:     General: Skin is warm  Neurological:      General: No focal deficit present  Mental Status: She is alert and oriented to person, place, and time  Psychiatric:         Mood and Affect: Mood normal          Behavior: Behavior normal            Results:    Imaging  No results found  I reviewed the above imaging data  Advance Care Planning/Advance Directives:  Discussed disease status, cancer treatment plans and/or cancer treatment goals with the patient

## 2022-10-11 ENCOUNTER — LAB (OUTPATIENT)
Dept: LAB | Facility: HOSPITAL | Age: 52
End: 2022-10-11
Attending: INTERNAL MEDICINE
Payer: COMMERCIAL

## 2022-10-11 ENCOUNTER — HOSPITAL ENCOUNTER (OUTPATIENT)
Dept: GASTROENTEROLOGY | Facility: HOSPITAL | Age: 52
Setting detail: OUTPATIENT SURGERY
Discharge: HOME | End: 2022-10-11
Attending: INTERNAL MEDICINE
Payer: COMMERCIAL

## 2022-10-11 ENCOUNTER — ANESTHESIA (OUTPATIENT)
Dept: GASTROENTEROLOGY | Facility: HOSPITAL | Age: 52
End: 2022-10-11

## 2022-10-11 ENCOUNTER — ANESTHESIA EVENT (OUTPATIENT)
Dept: GASTROENTEROLOGY | Facility: HOSPITAL | Age: 52
End: 2022-10-11

## 2022-10-11 VITALS
WEIGHT: 171.96 LBS | HEART RATE: 77 BPM | BODY MASS INDEX: 30.47 KG/M2 | HEIGHT: 63 IN | RESPIRATION RATE: 18 BRPM | TEMPERATURE: 97.4 F | DIASTOLIC BLOOD PRESSURE: 58 MMHG | SYSTOLIC BLOOD PRESSURE: 103 MMHG | OXYGEN SATURATION: 100 %

## 2022-10-11 DIAGNOSIS — Z12.11 SCREEN FOR COLON CANCER: ICD-10-CM

## 2022-10-11 DIAGNOSIS — R19.7 INTERMITTENT DIARRHEA: ICD-10-CM

## 2022-10-11 DIAGNOSIS — K21.9 GASTROESOPHAGEAL REFLUX DISEASE, UNSPECIFIED WHETHER ESOPHAGITIS PRESENT: ICD-10-CM

## 2022-10-11 DIAGNOSIS — Z11.59 ENCOUNTER FOR HEPATITIS C SCREENING TEST FOR LOW RISK PATIENT: ICD-10-CM

## 2022-10-11 LAB
25(OH)D3 SERPL-MCNC: 39.4 NG/ML (ref 30–100)
ALBUMIN SERPL BCP-MCNC: 4.1 G/DL (ref 3.5–5)
ALP SERPL-CCNC: 58 U/L (ref 34–104)
ALT SERPL W P-5'-P-CCNC: 12 U/L (ref 7–52)
ANION GAP SERPL CALCULATED.3IONS-SCNC: 9 MMOL/L (ref 4–13)
AST SERPL W P-5'-P-CCNC: 10 U/L (ref 13–39)
BILIRUB SERPL-MCNC: 1.06 MG/DL (ref 0.2–1)
BUN SERPL-MCNC: 6 MG/DL (ref 5–25)
CALCIUM SERPL-MCNC: 9.1 MG/DL (ref 8.4–10.2)
CHLORIDE SERPL-SCNC: 105 MMOL/L (ref 96–108)
CO2 SERPL-SCNC: 26 MMOL/L (ref 21–32)
CREAT SERPL-MCNC: 0.95 MG/DL (ref 0.6–1.3)
CRP SERPL QL: 7.1 MG/L
ERYTHROCYTE [DISTWIDTH] IN BLOOD BY AUTOMATED COUNT: 11.9 % (ref 11.6–15.1)
GFR SERPL CREATININE-BSD FRML MDRD: 70 ML/MIN/1.73SQ M
GLUCOSE P FAST SERPL-MCNC: 89 MG/DL (ref 65–99)
GLUCOSE SERPL-MCNC: 89 MG/DL (ref 65–140)
HCT VFR BLD AUTO: 44.2 % (ref 34.8–46.1)
HCV AB SER QL: NORMAL
HGB BLD-MCNC: 14.4 G/DL (ref 11.5–15.4)
MCH RBC QN AUTO: 29.8 PG (ref 26.8–34.3)
MCHC RBC AUTO-ENTMCNC: 32.6 G/DL (ref 31.4–37.4)
MCV RBC AUTO: 91 FL (ref 82–98)
PLATELET # BLD AUTO: 197 THOUSANDS/UL (ref 149–390)
PMV BLD AUTO: 9.8 FL (ref 8.9–12.7)
POTASSIUM SERPL-SCNC: 3.9 MMOL/L (ref 3.5–5.3)
PROT SERPL-MCNC: 6.6 G/DL (ref 6.4–8.4)
RBC # BLD AUTO: 4.84 MILLION/UL (ref 3.81–5.12)
SODIUM SERPL-SCNC: 140 MMOL/L (ref 135–147)
TSH SERPL DL<=0.05 MIU/L-ACNC: 1.89 UIU/ML (ref 0.45–4.5)
WBC # BLD AUTO: 6.7 THOUSAND/UL (ref 4.31–10.16)

## 2022-10-11 PROCEDURE — 86803 HEPATITIS C AB TEST: CPT

## 2022-10-11 PROCEDURE — 82784 ASSAY IGA/IGD/IGG/IGM EACH: CPT

## 2022-10-11 PROCEDURE — 84443 ASSAY THYROID STIM HORMONE: CPT

## 2022-10-11 PROCEDURE — 86140 C-REACTIVE PROTEIN: CPT

## 2022-10-11 PROCEDURE — 82653 EL-1 FECAL QUANTITATIVE: CPT

## 2022-10-11 PROCEDURE — 85027 COMPLETE CBC AUTOMATED: CPT

## 2022-10-11 PROCEDURE — 45385 COLONOSCOPY W/LESION REMOVAL: CPT | Performed by: INTERNAL MEDICINE

## 2022-10-11 PROCEDURE — 86258 DGP ANTIBODY EACH IG CLASS: CPT

## 2022-10-11 PROCEDURE — 82306 VITAMIN D 25 HYDROXY: CPT

## 2022-10-11 PROCEDURE — 88305 TISSUE EXAM BY PATHOLOGIST: CPT | Performed by: PATHOLOGY

## 2022-10-11 PROCEDURE — 86364 TISS TRNSGLTMNASE EA IG CLAS: CPT

## 2022-10-11 PROCEDURE — 82705 FATS/LIPIDS FECES QUAL: CPT

## 2022-10-11 PROCEDURE — 43239 EGD BIOPSY SINGLE/MULTIPLE: CPT | Performed by: INTERNAL MEDICINE

## 2022-10-11 PROCEDURE — 80053 COMPREHEN METABOLIC PANEL: CPT

## 2022-10-11 PROCEDURE — 86231 EMA EACH IG CLASS: CPT

## 2022-10-11 RX ORDER — SODIUM CHLORIDE, SODIUM LACTATE, POTASSIUM CHLORIDE, CALCIUM CHLORIDE 600; 310; 30; 20 MG/100ML; MG/100ML; MG/100ML; MG/100ML
125 INJECTION, SOLUTION INTRAVENOUS CONTINUOUS
Status: DISCONTINUED | OUTPATIENT
Start: 2022-10-11 | End: 2022-10-15 | Stop reason: HOSPADM

## 2022-10-11 RX ORDER — ONDANSETRON 2 MG/ML
INJECTION INTRAMUSCULAR; INTRAVENOUS AS NEEDED
Status: DISCONTINUED | OUTPATIENT
Start: 2022-10-11 | End: 2022-10-11

## 2022-10-11 RX ORDER — LIDOCAINE HYDROCHLORIDE 20 MG/ML
SOLUTION OROPHARYNGEAL CODE/TRAUMA/SEDATION MEDICATION
Status: COMPLETED | OUTPATIENT
Start: 2022-10-11 | End: 2022-10-11

## 2022-10-11 RX ORDER — LIDOCAINE HYDROCHLORIDE 20 MG/ML
15 SOLUTION OROPHARYNGEAL ONCE
Status: DISCONTINUED | OUTPATIENT
Start: 2022-10-11 | End: 2022-10-15 | Stop reason: HOSPADM

## 2022-10-11 RX ORDER — PROPOFOL 10 MG/ML
INJECTION, EMULSION INTRAVENOUS AS NEEDED
Status: DISCONTINUED | OUTPATIENT
Start: 2022-10-11 | End: 2022-10-11

## 2022-10-11 RX ADMIN — LIDOCAINE HYDROCHLORIDE 15 ML: 20 SOLUTION ORAL; TOPICAL at 14:29

## 2022-10-11 RX ADMIN — PROPOFOL 150 MG: 10 INJECTION, EMULSION INTRAVENOUS at 14:33

## 2022-10-11 RX ADMIN — PROPOFOL 30 MG: 10 INJECTION, EMULSION INTRAVENOUS at 14:35

## 2022-10-11 RX ADMIN — PROPOFOL 30 MG: 10 INJECTION, EMULSION INTRAVENOUS at 14:46

## 2022-10-11 RX ADMIN — PROPOFOL 30 MG: 10 INJECTION, EMULSION INTRAVENOUS at 14:49

## 2022-10-11 RX ADMIN — PROPOFOL 30 MG: 10 INJECTION, EMULSION INTRAVENOUS at 15:06

## 2022-10-11 RX ADMIN — PROPOFOL 40 MG: 10 INJECTION, EMULSION INTRAVENOUS at 14:51

## 2022-10-11 RX ADMIN — ONDANSETRON 4 MG: 2 INJECTION INTRAMUSCULAR; INTRAVENOUS at 14:27

## 2022-10-11 RX ADMIN — PROPOFOL 30 MG: 10 INJECTION, EMULSION INTRAVENOUS at 14:57

## 2022-10-11 RX ADMIN — PROPOFOL 40 MG: 10 INJECTION, EMULSION INTRAVENOUS at 15:03

## 2022-10-11 RX ADMIN — PROPOFOL 30 MG: 10 INJECTION, EMULSION INTRAVENOUS at 14:37

## 2022-10-11 RX ADMIN — PROPOFOL 30 MG: 10 INJECTION, EMULSION INTRAVENOUS at 14:41

## 2022-10-11 RX ADMIN — PROPOFOL 30 MG: 10 INJECTION, EMULSION INTRAVENOUS at 14:53

## 2022-10-11 RX ADMIN — SODIUM CHLORIDE, SODIUM LACTATE, POTASSIUM CHLORIDE, AND CALCIUM CHLORIDE 125 ML/HR: .6; .31; .03; .02 INJECTION, SOLUTION INTRAVENOUS at 12:46

## 2022-10-11 RX ADMIN — PROPOFOL 40 MG: 10 INJECTION, EMULSION INTRAVENOUS at 15:00

## 2022-10-11 RX ADMIN — SODIUM CHLORIDE, SODIUM LACTATE, POTASSIUM CHLORIDE, AND CALCIUM CHLORIDE: .6; .31; .03; .02 INJECTION, SOLUTION INTRAVENOUS at 14:58

## 2022-10-11 RX ADMIN — PROPOFOL 30 MG: 10 INJECTION, EMULSION INTRAVENOUS at 14:43

## 2022-10-11 RX ADMIN — PROPOFOL 30 MG: 10 INJECTION, EMULSION INTRAVENOUS at 14:55

## 2022-10-11 NOTE — ANESTHESIA POSTPROCEDURE EVALUATION
Post-Op Assessment Note    CV Status:  Stable    Pain management: satisfactory to patient     Mental Status:  Alert and awake   Hydration Status:  Stable   PONV Controlled:  None   Airway Patency:  Patent      Post Op Vitals Reviewed: Yes      Staff: CRNA         No complications documented      /63 (10/11/22 1525)    Temp (!) 97 3 °F (36 3 °C) (10/11/22 1525)    Pulse 88 (10/11/22 1525)   Resp 16 (10/11/22 1525)    SpO2 100 % (10/11/22 1525)

## 2022-10-11 NOTE — ANESTHESIA PREPROCEDURE EVALUATION
Procedure:  COLONOSCOPY  EGD    Relevant Problems   CARDIO   (+) Pain of left breast - benign cysts      GI/HEPATIC   (+) GERD (gastroesophageal reflux disease)      NEURO/PSYCH   (+) Encounter for follow-up examination after completed treatment for malignant neoplasm   (+) History of right breast cancer s/p right lumpectomy and radiation therapy   obesity  HLD       Physical Exam    Airway    Mallampati score: II  TM Distance: >3 FB  Neck ROM: full     Dental   No notable dental hx     Cardiovascular  Rhythm: regular, Rate: normal,     Pulmonary  Breath sounds clear to auscultation,     Other Findings        Anesthesia Plan  ASA Score- 2     Anesthesia Type- IV sedation with anesthesia with ASA Monitors  Additional Monitors:   Airway Plan:           Plan Factors-Exercise tolerance (METS): >4 METS  Chart reviewed  Existing labs reviewed  Patient summary reviewed  Patient is not a current smoker  Induction- intravenous  Postoperative Plan-     Informed Consent- Anesthetic plan and risks discussed with patient and spouse  I personally reviewed this patient with the CRNA  Discussed and agreed on the Anesthesia Plan with the CRNA  Karrie Nissen

## 2022-10-11 NOTE — INTERVAL H&P NOTE
H&P reviewed  After examining the patient I find no changes in the patients condition since the H&P had been written  Patient cut out drinking diet iced tea in her diarrhea improved  She just today dropped off her stool studies and had her blood work drawn  This for CBC is normal   Await other laboratory test   She is stable for today's procedure      Vitals:    10/11/22 1231   BP: 116/73   Pulse: 90   Resp: 18   Temp: 98 4 °F (36 9 °C)   SpO2: 97%

## 2022-10-12 LAB
ENDOMYSIUM IGA SER QL: NEGATIVE
GLIADIN PEPTIDE IGA SER-ACNC: 2 UNITS (ref 0–19)
GLIADIN PEPTIDE IGG SER-ACNC: 1 UNITS (ref 0–19)
IGA SERPL-MCNC: 142 MG/DL (ref 87–352)
TTG IGA SER-ACNC: <2 U/ML (ref 0–3)
TTG IGG SER-ACNC: <2 U/ML (ref 0–5)

## 2022-10-14 LAB — ELASTASE PANC STL-MCNT: 395 UG ELAST./G

## 2022-10-14 NOTE — RESULT ENCOUNTER NOTE
[employer] message sent to patient that fecal elastase was normal   Fecal fat is pending

## 2022-10-17 LAB
FAT STL QL: NORMAL
NEUTRAL FAT STL QL: NORMAL

## 2022-10-21 PROCEDURE — 88305 TISSUE EXAM BY PATHOLOGIST: CPT | Performed by: PATHOLOGY

## 2022-10-21 NOTE — RESULT ENCOUNTER NOTE
Please arrange for follow-up office visit in 6 months  Please recall for repeat colonoscopy in 3 years  I sent patient a my chart note stating:  iopsies of the duodenum were benign and negative for celiac disease  Biopsies the stomach benign and negative for a bacteria called H pylori  Polyps in the stomach are benign non growing type of polyps called fundic gland polyps which require no specific follow-up  Colon polyps are tubular adenomas  Tubular adenomas are precancerous polyps  These were completely removed and pose no significant increased risk to her health  However given that you form these types of polyps, I would recommend undergoing repeat colonoscopy in 3 years  Follow-up in my office in about 6 months or sooner if you are having problems

## 2022-10-24 ENCOUNTER — TELEPHONE (OUTPATIENT)
Dept: FAMILY MEDICINE CLINIC | Facility: CLINIC | Age: 52
End: 2022-10-24

## 2022-10-24 DIAGNOSIS — M25.50 ARTHRALGIA, UNSPECIFIED JOINT: ICD-10-CM

## 2022-10-24 RX ORDER — MELOXICAM 15 MG/1
15 TABLET ORAL DAILY
Qty: 30 TABLET | Refills: 0 | Status: SHIPPED | OUTPATIENT
Start: 2022-10-24

## 2022-10-24 NOTE — TELEPHONE ENCOUNTER
Patient asking if you can increase her Meloxicam due to not helping the arthritis pain   Please advise

## 2022-11-11 ENCOUNTER — APPOINTMENT (OUTPATIENT)
Dept: RADIOLOGY | Facility: MEDICAL CENTER | Age: 52
End: 2022-11-11

## 2022-11-11 ENCOUNTER — OFFICE VISIT (OUTPATIENT)
Dept: FAMILY MEDICINE CLINIC | Facility: CLINIC | Age: 52
End: 2022-11-11

## 2022-11-11 ENCOUNTER — TELEPHONE (OUTPATIENT)
Dept: OBGYN CLINIC | Facility: OTHER | Age: 52
End: 2022-11-11

## 2022-11-11 ENCOUNTER — APPOINTMENT (OUTPATIENT)
Dept: LAB | Facility: MEDICAL CENTER | Age: 52
End: 2022-11-11

## 2022-11-11 VITALS
DIASTOLIC BLOOD PRESSURE: 76 MMHG | WEIGHT: 176 LBS | SYSTOLIC BLOOD PRESSURE: 118 MMHG | OXYGEN SATURATION: 98 % | TEMPERATURE: 97.1 F | HEIGHT: 63 IN | HEART RATE: 98 BPM | BODY MASS INDEX: 31.18 KG/M2

## 2022-11-11 DIAGNOSIS — M19.90 ARTHRITIS: ICD-10-CM

## 2022-11-11 DIAGNOSIS — M25.50 ARTHRALGIA, UNSPECIFIED JOINT: ICD-10-CM

## 2022-11-11 DIAGNOSIS — D36.12: ICD-10-CM

## 2022-11-11 DIAGNOSIS — M19.90 ARTHRITIS: Primary | ICD-10-CM

## 2022-11-11 NOTE — TELEPHONE ENCOUNTER
Patient is being referred to a hand specialist  Please schedule accordingly      [address]   [phone]

## 2022-11-11 NOTE — PROGRESS NOTES
BMI Counseling: Body mass index is 31 18 kg/m²  The BMI is above normal  Nutrition recommendations include decreasing portion sizes, encouraging healthy choices of fruits and vegetables, moderation in carbohydrate intake and increasing intake of lean protein  Exercise recommendations include moderate physical activity 150 minutes/week  Rationale for BMI follow-up plan is due to patient being overweight or obese  Depression Screening and Follow-up Plan: Patient was screened for depression during today's encounter  They screened negative with a PHQ-2 score of 0  Assessment/Plan:    Problem List Items Addressed This Visit    None     Visit Diagnoses     Arthritis    -  Primary           Diagnoses and all orders for this visit:    Arthritis        No problem-specific Assessment & Plan notes found for this encounter  Subjective:      Patient ID: Azul  is a 50 y o  female      Mahala Gravely is here today chief complaint is that she has a a ganglionic cyst on her right wrist which is quite tender 2nd issue she is developing arthritic complaints throughout her body she was on meloxicam for a period of time after she had her surgery and then stopped it because it causes gastric upset she recently did have a GI colonoscopy she had tubular attic tubular adenoma so she will be going back for surveillance every 3-5 years arthritis seems to be the worst in her hands and feet but her knees and hips and her neck and low back are also symptomatic family history in both parents of arthritis but she is not sure what type      The following portions of the patient's history were reviewed and updated as appropriate:   She has a past medical history of Allergic rhinitis, Arthritis, Benign paroxysmal vertigo, BRCA1 negative, BRCA2 negative, Breast cancer ([facility] ) (2014), Bunion, left foot, Cancer ([facility] ) (08/11/2014), Fracture of phalanx of toe (07/21/2015), GERD (gastroesophageal reflux disease), History of radiation exposure, PONV (postoperative nausea and vomiting), Tendinitis (04/13/2015), and Wears glasses  ,  does not have any pertinent problems on file  ,   has a past surgical history that includes Breast lumpectomy w/ needle localization (Right, 09/23/2014); Colposcopy w/ biopsy / curettage; Endometrial ablation; and pr hallux rigidus w/cheilectomy 1st mp jt w/implt (Left, 6/25/2021)  ,  family history includes Arthritis in her maternal grandmother; Breast cancer in her family; Cancer in her maternal grandfather and paternal grandmother; Diabetes in her family and son; Heart disease in her father and maternal grandmother; Lung cancer in her paternal grandmother; No Known Problems in her maternal aunt, maternal aunt, maternal aunt, paternal aunt, paternal grandfather, son, and son; Pancreatic cancer in her maternal grandfather; Ulcerative colitis in her mother  ,   reports that she has never smoked  She has never used smokeless tobacco  She reports current alcohol use of about 2 0 standard drinks of alcohol per week  She reports that she does not use drugs  ,  is allergic to sulfa antibiotics     Current Outpatient Medications   Medication Sig Dispense Refill   • fluticasone (FLONASE) 50 mcg/act nasal spray INHALE 2 SPRAYS VIA EACH NOSTRIL ONCE DAILY 48 g 0   • loratadine (CLARITIN) 10 mg tablet Take 10 mg by mouth daily     • meloxicam (Mobic) 15 mg tablet Take 1 tablet (15 mg total) by mouth daily 30 tablet 0   • Multiple Vitamin (DAILY VALUE MULTIVITAMIN PO) Take 1 tablet by mouth daily     • famotidine (PEPCID) 10 mg tablet Take 1 tablet (10 mg total) by mouth 2 (two) times a day for 5 days (Patient not taking: No sig reported) 10 tablet 0   • predniSONE 10 mg tablet Take 5 tabs po x 2 days; 4 tabs po x 2 days; 3 tabs po x 1 day; 2 tabs po x 1 day  1 tab po x 1 day   (Patient not taking: No sig reported) 24 tablet 0   • scopolamine (TRANSDERM-SCOP) 1 5 mg/3 days TD 72 hr patch Place 1 patch behind ear at least 3 hours prior to airline flight, may leave on for 72 hours for secondary flights same-day (Patient not taking: No sig reported) 6 patch 1     No current facility-administered medications for this visit  Review of Systems   Constitutional: Positive for activity change  Negative for appetite change, diaphoresis, fatigue and fever  HENT: Negative  Negative for dental problem  Eyes: Negative  Respiratory: Negative for apnea, cough, chest tightness, shortness of breath and wheezing  Cardiovascular: Negative for chest pain, palpitations and leg swelling  Gastrointestinal: Negative for abdominal distention, abdominal pain, anal bleeding, constipation, diarrhea, nausea and vomiting  Tubular adenoma on recent colonoscopy   Endocrine: Negative for cold intolerance, heat intolerance, polydipsia, polyphagia and polyuria  Genitourinary: Negative for difficulty urinating, dysuria, flank pain, hematuria and urgency  Musculoskeletal: Positive for arthralgias and back pain  Negative for gait problem, joint swelling and myalgias  Skin: Negative for color change, rash and wound  Allergic/Immunologic: Negative for environmental allergies, food allergies and immunocompromised state  Neurological: Negative for dizziness, seizures, syncope, speech difficulty, numbness and headaches  Hematological: Negative for adenopathy  Does not bruise/bleed easily  Psychiatric/Behavioral: Negative for agitation, behavioral problems, hallucinations, sleep disturbance and suicidal ideas  Objective:  Vitals:    11/11/22 1203   BP: 118/76   BP Location: Left arm   Patient Position: Sitting   Cuff Size: Standard   Pulse: 98   Temp: (!) 97 1 °F (36 2 °C)   TempSrc: Temporal   SpO2: 98%   Weight: 79 8 kg (176 lb)   Height: 5' 3" (1 6 m)     Body mass index is 31 18 kg/m²  Physical Exam  Constitutional:       General: She is not in acute distress  Appearance: She is well-developed  She is not diaphoretic     HENT: Head: Normocephalic  Right Ear: External ear normal       Left Ear: External ear normal       Nose: Nose normal    Eyes:      General: No scleral icterus  Right eye: No discharge  Left eye: No discharge  Conjunctiva/sclera: Conjunctivae normal       Pupils: Pupils are equal, round, and reactive to light  Neck:      Thyroid: No thyromegaly  Trachea: No tracheal deviation  Cardiovascular:      Rate and Rhythm: Normal rate and regular rhythm  Heart sounds: Normal heart sounds  No murmur heard  No friction rub  No gallop  Pulmonary:      Effort: Pulmonary effort is normal  No respiratory distress  Breath sounds: Normal breath sounds  No wheezing  Abdominal:      General: Bowel sounds are normal       Palpations: Abdomen is soft  There is no mass  Tenderness: There is no abdominal tenderness  There is no guarding  Musculoskeletal:         General: No deformity  Cervical back: Normal range of motion  Lymphadenopathy:      Cervical: No cervical adenopathy  Skin:     General: Skin is warm and dry  Findings: No erythema or rash  Neurological:      Mental Status: She is alert and oriented to person, place, and time  Cranial Nerves: No cranial nerve deficit  Psychiatric:         Thought Content:  Thought content normal

## 2022-11-12 LAB
ANA SER QL IA: NEGATIVE
B BURGDOR IGG+IGM SER-ACNC: 0.3 AI
RHEUMATOID FACT SER QL LA: NEGATIVE

## 2022-11-15 LAB — CCP AB SER IA-ACNC: 0.9

## 2022-11-16 NOTE — RESULT ENCOUNTER NOTE
Call patient to notify normal results patient's cc peptide antibody was normal which would argue against any significant erosive arthritis at the present time x-rays are still pending please call x-ray department ask him to get these red they are over 11day-old and we need the results for the patient

## 2022-11-18 PROBLEM — Z12.11 SCREEN FOR COLON CANCER: Status: RESOLVED | Noted: 2022-09-19 | Resolved: 2022-11-18

## 2022-11-29 DIAGNOSIS — M25.50 ARTHRALGIA, UNSPECIFIED JOINT: ICD-10-CM

## 2022-11-29 RX ORDER — MELOXICAM 15 MG/1
15 TABLET ORAL DAILY
Qty: 30 TABLET | Refills: 0 | Status: SHIPPED | OUTPATIENT
Start: 2022-11-29

## 2022-12-19 ENCOUNTER — OFFICE VISIT (OUTPATIENT)
Dept: OBGYN CLINIC | Facility: CLINIC | Age: 52
End: 2022-12-19

## 2022-12-19 VITALS
HEIGHT: 63 IN | WEIGHT: 174 LBS | BODY MASS INDEX: 30.83 KG/M2 | DIASTOLIC BLOOD PRESSURE: 62 MMHG | SYSTOLIC BLOOD PRESSURE: 103 MMHG

## 2022-12-19 DIAGNOSIS — M67.432 GANGLION CYST OF VOLAR ASPECT OF LEFT WRIST: ICD-10-CM

## 2022-12-19 DIAGNOSIS — M67.431 GANGLION CYST OF VOLAR ASPECT OF RIGHT WRIST: Primary | ICD-10-CM

## 2022-12-19 DIAGNOSIS — M19.90 ARTHRITIS: ICD-10-CM

## 2022-12-19 DIAGNOSIS — G56.30 RADIAL TUNNEL SYNDROME: ICD-10-CM

## 2022-12-19 NOTE — PATIENT INSTRUCTIONS
What is a Ganglion Cyst?  Ganglion cysts are very common lumps within the hand and wrist that occur adjacent to joints or tendons  The most common locations are the top of the wrist (see Figure 1), the palm side of the wrist, the base of the finger on the palm side, and the top of the far joint of the finger (see Figure 2)  The ganglion cyst often resembles a water balloon on a stalk (see Figure 3), and is filled with clear fluid or gel  These cysts may change in size or even disappear completely, and they may or may not be painful  They are not cancerous and will not spread to other areas, but some people form cysts at multiple locations  Causes  The cause of these cysts is unknown, although they may form in the presence of joint or tendon irritation or mechanical changes  They occur in patients of all ages  Diagnosis  The diagnosis is usually based on the location of the lump and its appearance  Ganglion cysts are usually oval or round and may be soft or firm  Cysts at the base of the finger on the palm side are typically very firm, pea-sized nodules that are tender to applied pressure, such as when gripping  Light will often pass through these lumps (transillumination), and this can assist in the diagnosis  Your physician may request x-rays in order to look for evidence of problems in adjacent joints  Cysts at the far joint of the finger frequently have an arthritic bone spur -which is a small bony bump or projection- associated with them, the overlying skin may become thin, and there may be a lengthwise groove in the fingernail just beyond the cyst     Treatment  Treatment can often be non-surgical  In many cases, the cysts can simply be observed, especially if they are painless, because they frequently disappear spontaneously  If the cyst becomes painful, limits activity, or is otherwise unacceptable, several treatment options are available   The use of splints and anti-inflammatory medication can be prescribed in order to decrease pain associated with activities  An aspiration can be performed to remove the fluid from the cyst and decompress it  This requires placing a needle into the cyst, which can be performed in most office settings  Aspiration is a very simple procedure, but recurrence of the cyst is common  If non-surgical options fail to provide relief or if the cyst recurs, surgical alternatives are available  Surgery involves removing the cyst along with a portion of the joint capsule or tendon sheath (see Figure 3)  In the case of wrist ganglion cysts, both traditional open and arthroscopic techniques usually yield good results  Surgical treatment is generally successful although cysts may recur  If there is any question about the diagnosis, excisional biopsy with a pathological examination will better define what the mass is  Your surgeon will discuss the best treatment options for you  © 2012 [employer]  www handcare  org

## 2022-12-19 NOTE — PROGRESS NOTES
ASSESSMENT/PLAN:    Assessment:   Bilateral wrist volar mass, right radial tunnel    Plan:   Patient will have ultrasound-guided wrist aspiration and injections performed  She was advised to attend physical therapy to work on right radial tunnel  She was advised that the cyst can return at which point we would discuss surgical intervention  She was advised it can take several months for radial tunnel to resolve on its own and usually does not require surgical intervention  She will follow-up with us as needed    Follow Up:  PRN    To Do Next Visit:  Reevaluation    General Discussions:  Ganglion Cysts: The anatomy and physiology of the ganglion was discussed with the patient today in the office  Fluid leaking out of the joint surface typically creates a small sac, which can enlarge and cause pain or limitation of motion  Treatment options include observation, aspiration, or surgical incision were discussed with the patient today  Observation typically lead to resolution and approximately 10% of patients, aspiration least resolution approximately 50% of patients, and surgical excision lead to resolution in approximately 97% of patients  After discussion with the patient today, the patient voiced understanding of all treatment options  _____________________________________________________  CHIEF COMPLAINT:  Chief Complaint   Patient presents with   • Right Hand - Pain     XR 11/11/22    • Right Wrist - Mass         SUBJECTIVE:  Crystal Mccollum is a 50 y o  female who presents with a right wrist mass  She states its been there for about 1 month  She states it is tender  She denies any injuries or trauma to the area  She also notes some pain in her forearm  She describes it as a burning pain  Again she denies any injuries or trauma to the area  She states that she will feel fatigue if she overuses her right upper extremity    She also notes a mass on the left volar aspect of her wrist     PAST MEDICAL HISTORY:  Past Medical History:   Diagnosis Date   • Allergic rhinitis    • Arthritis     Osteoarthritis in right hand   • Benign paroxysmal vertigo    • BRCA1 negative    • BRCA2 negative    • Breast cancer ([facility] ) 2014    rt breast   • Bunion, left foot     OR correction today 6/25/2021   • Cancer ([facility] ) 08/11/2014    [facility] rt breast   • Fracture of phalanx of toe 07/21/2015    Last assessed   • GERD (gastroesophageal reflux disease)    • History of radiation exposure    • PONV (postoperative nausea and vomiting)    • Tendinitis 04/13/2015    Last assessed   • Wears glasses        PAST SURGICAL HISTORY:  Past Surgical History:   Procedure Laterality Date   • BREAST LUMPECTOMY W/ NEEDLE LOCALIZATION Right 09/23/2014    Dr Gita Morgan   • COLPOSCOPY W/ BIOPSY / CURETTAGE     • ENDOMETRIAL ABLATION     • VT HALLUX RIGIDUS W/CHEILECTOMY 1ST MP JT W/IMPLT Left 6/25/2021    Procedure: IMPLANT ARTHROPLASTY;  Surgeon: Cassie Gamboa DPM;  Location: [facility];  Service: Podiatry       FAMILY HISTORY:  Family History   Problem Relation Age of Onset   • Ulcerative colitis Mother    • Heart disease Father    • Arthritis Maternal Grandmother    • Heart disease Maternal Grandmother    • Pancreatic cancer Maternal Grandfather    • Cancer Maternal Grandfather    • Lung cancer Paternal Grandmother    • Cancer Paternal Grandmother         Lung   • No Known Problems Paternal Grandfather    • Diabetes Son    • No Known Problems Son    • No Known Problems Son    • No Known Problems Maternal Aunt    • No Known Problems Maternal Aunt    • No Known Problems Maternal Aunt    • No Known Problems Paternal Aunt    • Breast cancer Family    • Diabetes Family        SOCIAL HISTORY:  Social History     Tobacco Use   • Smoking status: Never   • Smokeless tobacco: Never   Vaping Use   • Vaping Use: Never used   Substance Use Topics   • Alcohol use:  Yes     Alcohol/week: 2 0 standard drinks     Types: 2 Shots of liquor per week     Comment: social   2 x weekly   • Drug use: No       MEDICATIONS:    Current Outpatient Medications:   •  fluticasone (FLONASE) 50 mcg/act nasal spray, INHALE 2 SPRAYS VIA EACH NOSTRIL ONCE DAILY, Disp: 48 g, Rfl: 0  •  loratadine (CLARITIN) 10 mg tablet, Take 10 mg by mouth daily, Disp: , Rfl:   •  meloxicam (Mobic) 15 mg tablet, Take 1 tablet (15 mg total) by mouth daily, Disp: 30 tablet, Rfl: 0  •  Multiple Vitamin (DAILY VALUE MULTIVITAMIN PO), Take 1 tablet by mouth daily, Disp: , Rfl:   •  famotidine (PEPCID) 10 mg tablet, Take 1 tablet (10 mg total) by mouth 2 (two) times a day for 5 days (Patient not taking: No sig reported), Disp: 10 tablet, Rfl: 0  •  predniSONE 10 mg tablet, Take 5 tabs po x 2 days; 4 tabs po x 2 days; 3 tabs po x 1 day; 2 tabs po x 1 day  1 tab po x 1 day  (Patient not taking: Reported on 1/10/2022), Disp: 24 tablet, Rfl: 0  •  scopolamine (TRANSDERM-SCOP) 1 5 mg/3 days TD 72 hr patch, Place 1 patch behind ear at least 3 hours prior to airline flight, may leave on for 72 hours for secondary flights same-day (Patient not taking: Reported on 12/11/2021), Disp: 6 patch, Rfl: 1    ALLERGIES:  Allergies   Allergen Reactions   • Sulfa Antibiotics Rash       REVIEW OF SYSTEMS:  Pertinent items are noted in HPI  A comprehensive review of systems was negative      LABS:  HgA1c:   Lab Results   Component Value Date    HGBA1C 5 2 06/06/2022     BMP:   Lab Results   Component Value Date    GLUCOSE 92 12/31/2014    CALCIUM 9 1 10/11/2022     12/31/2014    K 3 9 10/11/2022    CO2 26 10/11/2022     10/11/2022    BUN 6 10/11/2022    CREATININE 0 95 10/11/2022       _____________________________________________________  PHYSICAL EXAMINATION:  Vital signs: /62   Ht 5' 3" (1 6 m)   Wt 78 9 kg (174 lb)   BMI 30 82 kg/m²   General: well developed and well nourished, alert, oriented times 3 and appears comfortable  Psychiatric: Normal  HEENT: Trachea Midline, No torticollis  Cardiovascular: No discernable arrhythmia  Pulmonary: No wheezing or stridor  Abdomen: No rebound or guarding  Extremities: No peripheral edema  Skin: No masses, erythema, lacerations, fluctation, ulcerations  Neurovascular: Sensation Intact to the Median, Ulnar, Radial Nerve, Motor Intact to the Median, Ulnar, Radial Nerve and Pulses Intact    MUSCULOSKELETAL EXAMINATION:  Right wrist  No erythema edema or ecchymosis noted, skin is warm to touch  Tenderness to palpation over a palpable bump appreciated over the volar aspect of the wrist  She has full range of motion of the wrist compared to the contralateral side  She has tenderness to palpation over the dorsal aspect of the forearm at the radial tunnel  She does experience a burning sensation with palpation  Patient is neurovascularly intact    Left wrist  No erythema edema or ecchymosis noted, skin is warm to touch  No tenderness to palpation over the volar aspect of her wrist of which there is a palpable bump  Skin is nontranslucent  She has full wrist range of motion compared to the contralateral side  Patient is neurovascular intact    _____________________________________________________  STUDIES REVIEWED:  No Studies to review      PROCEDURES PERFORMED:  Procedures  No Procedures performed today     Scribe Attestation    I,:  Mary Ferraro PA-C am acting as a scribe while in the presence of the attending physician :       I,:  Carmen Graham MD personally performed the services described in this documentation    as scribed in my presence :             Scribe Attestation    I,:  Mary Ferraro PA-C am acting as a scribe while in the presence of the attending physician :       I,:  Carmen Graham MD personally performed the services described in this documentation    as scribed in my presence :

## 2022-12-20 ENCOUNTER — TELEPHONE (OUTPATIENT)
Dept: OBGYN CLINIC | Facility: CLINIC | Age: 52
End: 2022-12-20

## 2022-12-29 DIAGNOSIS — M25.50 ARTHRALGIA, UNSPECIFIED JOINT: ICD-10-CM

## 2022-12-30 RX ORDER — MELOXICAM 15 MG/1
15 TABLET ORAL DAILY
Qty: 30 TABLET | Refills: 0 | Status: SHIPPED | OUTPATIENT
Start: 2022-12-30

## 2023-01-24 ENCOUNTER — TELEPHONE (OUTPATIENT)
Dept: OBGYN CLINIC | Facility: HOSPITAL | Age: 53
End: 2023-01-24

## 2023-01-24 NOTE — TELEPHONE ENCOUNTER
Caller: Patient    Doctor: Sada Brooks    Reason for call: Patient has appointment to get ganglion cyst removed on 1/27  However, it is now gone  Please advise       Call back#: [phone] Statement Selected

## 2023-01-24 NOTE — TELEPHONE ENCOUNTER
I gave the patient a return call  The cyst did resolve on its own  She is going to reschedule her appointment with radiology for about 4 weeks from now  If the mass does not return she will cancel her appointment and if it returns she will proceed with getting it aspirated

## 2023-01-24 NOTE — TELEPHONE ENCOUNTER
CLM on pt's identified voicemail to callback to clarify if ganglion cyst is present or not as msg needs clarification

## 2023-01-27 ENCOUNTER — HOSPITAL ENCOUNTER (OUTPATIENT)
Dept: RADIOLOGY | Facility: HOSPITAL | Age: 53
Discharge: HOME | End: 2023-01-27

## 2023-02-07 DIAGNOSIS — M25.50 ARTHRALGIA, UNSPECIFIED JOINT: ICD-10-CM

## 2023-02-08 RX ORDER — MELOXICAM 15 MG/1
15 TABLET ORAL DAILY
Qty: 30 TABLET | Refills: 0 | Status: SHIPPED | OUTPATIENT
Start: 2023-02-08

## 2023-02-27 ENCOUNTER — OFFICE VISIT (OUTPATIENT)
Dept: FAMILY MEDICINE CLINIC | Facility: CLINIC | Age: 53
End: 2023-02-27

## 2023-02-27 ENCOUNTER — APPOINTMENT (OUTPATIENT)
Dept: RADIOLOGY | Facility: MEDICAL CENTER | Age: 53
End: 2023-02-27

## 2023-02-27 VITALS
HEART RATE: 93 BPM | OXYGEN SATURATION: 98 % | BODY MASS INDEX: 31.36 KG/M2 | SYSTOLIC BLOOD PRESSURE: 110 MMHG | TEMPERATURE: 97 F | WEIGHT: 177 LBS | HEIGHT: 63 IN | DIASTOLIC BLOOD PRESSURE: 74 MMHG

## 2023-02-27 DIAGNOSIS — M79.671 RIGHT FOOT PAIN: ICD-10-CM

## 2023-02-27 DIAGNOSIS — M79.671 RIGHT FOOT PAIN: Primary | ICD-10-CM

## 2023-02-27 DIAGNOSIS — M25.50 MULTIPLE JOINT PAIN: ICD-10-CM

## 2023-02-27 NOTE — PROGRESS NOTES
Assessment/Plan:    Problem List Items Addressed This Visit    None  Visit Diagnoses     Right foot pain    -  Primary    Check xray R foot    Relevant Medications    diclofenac sodium (VOLTAREN) 50 mg EC tablet    Other Relevant Orders    XR foot 3+ vw right    Multiple joint pain        Trial change from meloxicam to diclofenac, will also refer to rheumatology  Relevant Orders    Ambulatory Referral to Rheumatology           Diagnoses and all orders for this visit:    Right foot pain  Comments:  Check xray R foot  Orders:  -     diclofenac sodium (VOLTAREN) 50 mg EC tablet; Take 1 tablet (50 mg total) by mouth 2 (two) times a day  -     XR foot 3+ vw right; Future    Multiple joint pain  Comments:  Trial change from meloxicam to diclofenac, will also refer to rheumatology  Orders:  -     Ambulatory Referral to Rheumatology; Future            Subjective:      Patient ID: Triny Lucas is a 48 y o  female  Cagle Woodburn is here today complaining primarily of new onset pain in R lateral foot x weeks  Denies injury  She takes meloxicam daily without pain relief  She does state that in general, arthritic body pain is worsening and is no longer controlled with meloxicam  Had labs done few months ago with Dr Shaan Anders that did not show signs of erosive arthritis  The following portions of the patient's history were reviewed and updated as appropriate:   She has a past medical history of Allergic rhinitis, Arthritis, Benign paroxysmal vertigo, BRCA1 negative, BRCA2 negative, Breast cancer ([facility] [address] ) (2014), Bunion, left foot, Cancer ([facility] [address] ) (08/11/2014), Fracture of phalanx of toe (07/21/2015), GERD (gastroesophageal reflux disease), History of radiation exposure, PONV (postoperative nausea and vomiting), Tendinitis (04/13/2015), and Wears glasses  ,  does not have any pertinent problems on file  ,   has a past surgical history that includes Breast lumpectomy w/ needle localization (Right, 09/23/2014);  Colposcopy w/ biopsy / curettage; Endometrial ablation; and pr hallux rigidus w/cheilectomy 1st mp jt w/implt (Left, 6/25/2021)  ,  family history includes Arthritis in her maternal grandmother; Breast cancer in her family; Cancer in her maternal grandfather and paternal grandmother; Diabetes in her family and son; Heart disease in her father and maternal grandmother; Lung cancer in her paternal grandmother; No Known Problems in her maternal aunt, maternal aunt, maternal aunt, paternal aunt, paternal grandfather, son, and son; Pancreatic cancer in her maternal grandfather; Ulcerative colitis in her mother  ,   reports that she has never smoked  She has never used smokeless tobacco  She reports current alcohol use of about 2 0 standard drinks per week  She reports that she does not use drugs  ,  is allergic to sulfa antibiotics     Current Outpatient Medications   Medication Sig Dispense Refill   • diclofenac sodium (VOLTAREN) 50 mg EC tablet Take 1 tablet (50 mg total) by mouth 2 (two) times a day 60 tablet 0   • fluticasone (FLONASE) 50 mcg/act nasal spray INHALE 2 SPRAYS VIA EACH NOSTRIL ONCE DAILY 48 g 0   • loratadine (CLARITIN) 10 mg tablet Take 10 mg by mouth daily     • Multiple Vitamin (DAILY VALUE MULTIVITAMIN PO) Take 1 tablet by mouth daily     • fluticasone (FLONASE) 50 mcg/act nasal spray INHALE 2 SPRAYS VIA EACH NOSTRIL ONCE DAILY 48 g 0   • loratadine (CLARITIN) 10 mg tablet Take 10 mg by mouth daily       No current facility-administered medications for this visit  Review of Systems   Constitutional: Negative for activity change, appetite change, chills, diaphoresis, fatigue, fever and unexpected weight change  HENT: Negative for congestion, ear pain, postnasal drip, rhinorrhea, sinus pressure, sinus pain, sneezing, sore throat, tinnitus and voice change  Eyes: Negative for pain, redness and visual disturbance  Respiratory: Negative for cough, chest tightness, shortness of breath and wheezing      Cardiovascular: Negative for chest pain, palpitations and leg swelling  Gastrointestinal: Negative for abdominal pain, blood in stool, constipation, diarrhea, nausea and vomiting  Genitourinary: Negative for difficulty urinating, dysuria, frequency, hematuria and urgency  Musculoskeletal: Positive for arthralgias  Negative for back pain, gait problem, joint swelling, myalgias, neck pain and neck stiffness  Skin: Negative for color change, pallor, rash and wound  Neurological: Negative for dizziness, tremors, weakness, light-headedness and headaches  Psychiatric/Behavioral: Negative for dysphoric mood, self-injury, sleep disturbance and suicidal ideas  The patient is not nervous/anxious  Objective:  Vitals:    02/27/23 1651   BP: 110/74   BP Location: Left arm   Patient Position: Sitting   Cuff Size: Standard   Pulse: 93   Temp: (!) 97 °F (36 1 °C)   TempSrc: Temporal   SpO2: 98%   Weight: 80 3 kg (177 lb)   Height: 5' 3" (1 6 m)     Body mass index is 31 35 kg/m²  Physical Exam  Vitals reviewed  Constitutional:       General: She is not in acute distress  Appearance: She is well-developed  She is not diaphoretic  HENT:      Head: Normocephalic and atraumatic  Right Ear: Hearing, tympanic membrane, ear canal and external ear normal       Left Ear: Hearing, tympanic membrane, ear canal and external ear normal       Mouth/Throat:      Pharynx: Uvula midline  No oropharyngeal exudate  Eyes:      General: No scleral icterus  Right eye: No discharge  Left eye: No discharge  Conjunctiva/sclera: Conjunctivae normal    Neck:      Thyroid: No thyromegaly  Vascular: No carotid bruit  Cardiovascular:      Rate and Rhythm: Normal rate and regular rhythm  Heart sounds: Normal heart sounds  No murmur heard  Pulmonary:      Effort: Pulmonary effort is normal  No respiratory distress  Breath sounds: Normal breath sounds  No wheezing     Abdominal:      General: Bowel sounds are normal  There is no distension  Palpations: Abdomen is soft  There is no mass  Tenderness: There is no abdominal tenderness  There is no guarding or rebound  Musculoskeletal:         General: No tenderness  Normal range of motion  Cervical back: Neck supple  Feet:    Lymphadenopathy:      Cervical: No cervical adenopathy  Skin:     General: Skin is warm and dry  Findings: No erythema or rash  Neurological:      Mental Status: She is alert and oriented to person, place, and time  Psychiatric:         Behavior: Behavior normal          Thought Content:  Thought content normal          Judgment: Judgment normal

## 2023-03-02 ENCOUNTER — TELEPHONE (OUTPATIENT)
Dept: RADIOLOGY | Facility: HOSPITAL | Age: 53
End: 2023-03-02

## 2023-03-02 NOTE — NURSING NOTE
Call placed to pt to discuss upcoming appointment at [facility] Radiology Department  No answer but message left as pt is scheduled for a L and R wrist ganglion cyst Aspiration and CSI tomorrow 3/3/2023  Pre procedure instructions including diet and taking own medications discussed with pt  Pt instructed that she may eat normally and take medications as usual before the procedure  Reminded pt of the location, date and time of procedure  My number was given to call if any questions or concerns arise pre or post procedure

## 2023-03-03 ENCOUNTER — HOSPITAL ENCOUNTER (OUTPATIENT)
Dept: RADIOLOGY | Facility: HOSPITAL | Age: 53
Discharge: HOME | End: 2023-03-03

## 2023-03-06 ENCOUNTER — TELEPHONE (OUTPATIENT)
Dept: FAMILY MEDICINE CLINIC | Facility: CLINIC | Age: 53
End: 2023-03-06

## 2023-03-06 NOTE — TELEPHONE ENCOUNTER
She is established with Dr Marybeth Craig, he did surgery on her other foot  Doesn't she want to continue with him?

## 2023-04-24 ENCOUNTER — OFFICE VISIT (OUTPATIENT)
Dept: PODIATRY | Facility: CLINIC | Age: 53
End: 2023-04-24

## 2023-04-24 VITALS
HEART RATE: 78 BPM | DIASTOLIC BLOOD PRESSURE: 73 MMHG | HEIGHT: 63 IN | BODY MASS INDEX: 31.35 KG/M2 | SYSTOLIC BLOOD PRESSURE: 109 MMHG

## 2023-04-24 DIAGNOSIS — M76.71 PERONEAL TENDINITIS OF RIGHT LOWER EXTREMITY: Primary | ICD-10-CM

## 2023-04-24 DIAGNOSIS — M79.671 RIGHT FOOT PAIN: ICD-10-CM

## 2023-04-24 RX ORDER — LIDOCAINE HYDROCHLORIDE 10 MG/ML
1 INJECTION, SOLUTION EPIDURAL; INFILTRATION; INTRACAUDAL; PERINEURAL ONCE
Status: COMPLETED | OUTPATIENT
Start: 2023-04-24 | End: 2023-04-24

## 2023-04-24 RX ORDER — TRIAMCINOLONE ACETONIDE 40 MG/ML
20 INJECTION, SUSPENSION INTRA-ARTICULAR; INTRAMUSCULAR ONCE
Status: COMPLETED | OUTPATIENT
Start: 2023-04-24 | End: 2023-04-24

## 2023-04-24 RX ADMIN — LIDOCAINE HYDROCHLORIDE 1 ML: 10 INJECTION, SOLUTION EPIDURAL; INFILTRATION; INTRACAUDAL; PERINEURAL at 15:30

## 2023-04-24 RX ADMIN — TRIAMCINOLONE ACETONIDE 20 MG: 40 INJECTION, SUSPENSION INTRA-ARTICULAR; INTRAMUSCULAR at 15:30

## 2023-04-24 NOTE — PROGRESS NOTES
"Assessment/Plan:    Explained the patient that her symptoms are consistent with peroneal tendinitis at the fifth metatarsal base right foot  Treatment consisted of injecting fifth metatarsal base right foot with 0 5 cc Kenalog 40 along with 1 cc 1% Xylocaine  Patient notes that she is on Voltaren and she was told to continue as it may also be helpful  Reappoint 4 weeks  No problem-specific Assessment & Plan notes found for this encounter  Diagnoses and all orders for this visit:    Peroneal tendinitis of right lower extremity  -     lidocaine (PF) (XYLOCAINE-MPF) 1 % injection 1 mL  -     triamcinolone acetonide (KENALOG-40) 40 mg/mL injection 20 mg    Right foot pain          Subjective:      Patient ID: Soumya Rhodes is a 48 y o  female  HPI     Patient, a 42-year-old female in good health presents with pain along the side of the right foot  Patient has been in pain for approximately 6 months  No trauma is noted  She notes that the pain is intermittent  Today the pain is relatively mild  Patient states that she had x-rays which were read as negative for fracture or pathology  I personally reviewed x-rays dated 2/27/2023  They were negative for osseous pathology  The following portions of the patient's history were reviewed and updated as appropriate: allergies, current medications, past family history, past medical history, past social history, past surgical history and problem list     Review of Systems   Gastrointestinal:        GERD   Musculoskeletal: Positive for arthralgias  Psychiatric/Behavioral: Negative  Objective:      /73   Pulse 78   Ht 5' 3\" (1 6 m)   BMI 31 35 kg/m²          Physical Exam  Constitutional:       Appearance: Normal appearance  Cardiovascular:      Pulses: Normal pulses  Musculoskeletal:         General: Tenderness present  Comments: Pain with palpation fifth metatarsal base right foot  No bruising or swelling noted   " Skin:     General: Skin is warm  Neurological:      General: No focal deficit present  Mental Status: She is oriented to person, place, and time  Foot injection     Date/Time 4/24/2023 3:36 PM     Performed by  Rylie Griffin DPM     Authorized by Rylie Griffin DPM      Universal Protocol   Consent: Verbal consent obtained  Risks and benefits: risks, benefits and alternatives were discussed  Consent given by: patient  Patient understanding: patient states understanding of the procedure being performed  Patient identity confirmed: verbally with patient        Local anesthesia used: yes     Anesthesia   Local anesthesia used: yes  Local Anesthetic: lidocaine 1% without epinephrine     Procedure Details   Procedure Notes: Injected right foot with 0 5 cc Kenalog 40 along with 1 cc 1% Xylocaine

## 2023-04-25 DIAGNOSIS — M79.671 RIGHT FOOT PAIN: ICD-10-CM

## 2023-05-25 ENCOUNTER — OFFICE VISIT (OUTPATIENT)
Dept: PODIATRY | Facility: CLINIC | Age: 53
End: 2023-05-25

## 2023-05-25 VITALS — BODY MASS INDEX: 31.35 KG/M2 | HEIGHT: 63 IN

## 2023-05-25 DIAGNOSIS — M76.71 PERONEAL TENDINITIS OF RIGHT LOWER EXTREMITY: Primary | ICD-10-CM

## 2023-05-25 NOTE — PROGRESS NOTES
Patient presents for assessment of right foot  Patient responded very well to cortisone injection for peroneal brevis tendinitis right foot  There is no pain with palpation  No additional treatment is needed at this time    Reappoint as needed

## 2023-05-29 DIAGNOSIS — M79.671 RIGHT FOOT PAIN: ICD-10-CM

## 2023-06-05 ENCOUNTER — TELEPHONE (OUTPATIENT)
Dept: FAMILY MEDICINE CLINIC | Facility: CLINIC | Age: 53
End: 2023-06-05

## 2023-06-06 DIAGNOSIS — T75.3XXA MOTION SICKNESS, INITIAL ENCOUNTER: Primary | ICD-10-CM

## 2023-06-06 RX ORDER — SCOLOPAMINE TRANSDERMAL SYSTEM 1 MG/1
PATCH, EXTENDED RELEASE TRANSDERMAL
Qty: 4 PATCH | Refills: 0 | Status: SHIPPED | OUTPATIENT
Start: 2023-06-06

## 2023-06-06 NOTE — TELEPHONE ENCOUNTER
Pt called back with the information that the first flight is this Saturday & returning the following Saturday    The second flight is in August leaving on a Thursday & return on the following Sunday, she believes

## 2023-06-20 ENCOUNTER — APPOINTMENT (OUTPATIENT)
Dept: LAB | Facility: MEDICAL CENTER | Age: 53
End: 2023-06-20

## 2023-06-20 DIAGNOSIS — Z00.8 HEALTH EXAMINATION IN POPULATION SURVEY: ICD-10-CM

## 2023-06-20 LAB
CHOLEST SERPL-MCNC: 232 MG/DL
EST. AVERAGE GLUCOSE BLD GHB EST-MCNC: 100 MG/DL
HBA1C MFR BLD: 5.1 %
HDLC SERPL-MCNC: 59 MG/DL
LDLC SERPL CALC-MCNC: 140 MG/DL (ref 0–100)
NONHDLC SERPL-MCNC: 173 MG/DL
TRIGL SERPL-MCNC: 165 MG/DL

## 2023-06-20 PROCEDURE — 83036 HEMOGLOBIN GLYCOSYLATED A1C: CPT

## 2023-06-20 PROCEDURE — 80061 LIPID PANEL: CPT

## 2023-06-20 PROCEDURE — 36415 COLL VENOUS BLD VENIPUNCTURE: CPT

## 2023-07-03 DIAGNOSIS — M79.671 RIGHT FOOT PAIN: ICD-10-CM

## 2023-07-16 ENCOUNTER — OFFICE VISIT (OUTPATIENT)
Dept: URGENT CARE | Facility: MEDICAL CENTER | Age: 53
End: 2023-07-16
Payer: COMMERCIAL

## 2023-07-16 VITALS
OXYGEN SATURATION: 98 % | HEART RATE: 80 BPM | RESPIRATION RATE: 20 BRPM | TEMPERATURE: 98.3 F | BODY MASS INDEX: 28.98 KG/M2 | WEIGHT: 163.6 LBS | DIASTOLIC BLOOD PRESSURE: 72 MMHG | SYSTOLIC BLOOD PRESSURE: 103 MMHG

## 2023-07-16 DIAGNOSIS — R39.9 UTI SYMPTOMS: ICD-10-CM

## 2023-07-16 DIAGNOSIS — N30.90 CYSTITIS: Primary | ICD-10-CM

## 2023-07-16 LAB
SL AMB  POCT GLUCOSE, UA: ABNORMAL
SL AMB LEUKOCYTE ESTERASE,UA: ABNORMAL
SL AMB POCT BILIRUBIN,UA: ABNORMAL
SL AMB POCT BLOOD,UA: ABNORMAL
SL AMB POCT CLARITY,UA: CLEAR
SL AMB POCT COLOR,UA: YELLOW
SL AMB POCT KETONES,UA: ABNORMAL
SL AMB POCT NITRITE,UA: ABNORMAL
SL AMB POCT PH,UA: 5
SL AMB POCT SPECIFIC GRAVITY,UA: 1
SL AMB POCT URINE PROTEIN: ABNORMAL
SL AMB POCT UROBILINOGEN: 0.2

## 2023-07-16 PROCEDURE — 87186 SC STD MICRODIL/AGAR DIL: CPT | Performed by: PHYSICIAN ASSISTANT

## 2023-07-16 PROCEDURE — 81002 URINALYSIS NONAUTO W/O SCOPE: CPT | Performed by: PHYSICIAN ASSISTANT

## 2023-07-16 PROCEDURE — 87086 URINE CULTURE/COLONY COUNT: CPT | Performed by: PHYSICIAN ASSISTANT

## 2023-07-16 PROCEDURE — 87077 CULTURE AEROBIC IDENTIFY: CPT | Performed by: PHYSICIAN ASSISTANT

## 2023-07-16 PROCEDURE — 99213 OFFICE O/P EST LOW 20 MIN: CPT | Performed by: PHYSICIAN ASSISTANT

## 2023-07-16 RX ORDER — NITROFURANTOIN 25; 75 MG/1; MG/1
100 CAPSULE ORAL 2 TIMES DAILY
Qty: 10 CAPSULE | Refills: 0 | Status: SHIPPED | OUTPATIENT
Start: 2023-07-16 | End: 2023-07-21

## 2023-07-16 NOTE — PATIENT INSTRUCTIONS
Take Nitrofurantoin as prescribed  Drink plenty of water   Cranberry supplements  Urinate within 5 minutes following intercourse  Follow up with PCP in 3-5 days. Proceed to  ER if symptoms worsen. Eat yogurt with live and active cultures and/or take a probiotic at least 3 hours before or after antibiotic dose. Monitor stool for diarrhea and/or blood. If this occurs, contact primary care doctor ASAP.

## 2023-07-16 NOTE — PROGRESS NOTES
[facility]        NAME: Shwetha Wei is a 48 y.o. female  : 1970    MRN: [id number]  DATE: 2023  TIME: 3:11 PM    Assessment and Plan   Cystitis [N30.90]  1. Cystitis  nitrofurantoin (MACROBID) 100 mg capsule      2. UTI symptoms  POCT urine dip    Urine culture            Patient Instructions     Take Nitrofurantoin as prescribed  Drink plenty of water   Cranberry supplements  Urinate within 5 minutes following intercourse  Follow up with PCP in 3-5 days. Proceed to  ER if symptoms worsen. Eat yogurt with live and active cultures and/or take a probiotic at least 3 hours before or after antibiotic dose. Monitor stool for diarrhea and/or blood. If this occurs, contact primary care doctor ASAP. Chief Complaint     Chief Complaint   Patient presents with   • Possible UTI     X 1 week. Frequency and burning. Using AZO and Cranberry juice. History of Present Illness       Reports improving cloudy urine. Urinary Tract Infection   This is a new problem. The current episode started in the past 7 days. The quality of the pain is described as burning. The pain is moderate. There has been no fever. There is no history of pyelonephritis. Associated symptoms include flank pain (day 2 on L side that has since resolved) and frequency. Pertinent negatives include no chills, hematuria, nausea, urgency or vomiting. Treatments tried: azo; cranberry juice. There is no history of catheterization or a urological procedure. Review of Systems   Review of Systems   Constitutional: Negative for chills and fever. Gastrointestinal: Positive for abdominal pain (suprapubic pressure improving). Negative for nausea and vomiting. Genitourinary: Positive for dysuria, flank pain (day 2 on L side that has since resolved) and frequency. Negative for hematuria and urgency.          Current Medications       Current Outpatient Medications:   •  diclofenac sodium (VOLTAREN) 50 mg EC tablet, Take 1 tablet (50 mg total) by mouth 2 (two) times a day, Disp: 60 tablet, Rfl: 0  •  fluticasone (FLONASE) 50 mcg/act nasal spray, INHALE 2 SPRAYS VIA EACH NOSTRIL ONCE DAILY, Disp: 48 g, Rfl: 0  •  loratadine (CLARITIN) 10 mg tablet, Take 10 mg by mouth daily, Disp: , Rfl:   •  Multiple Vitamin (DAILY VALUE MULTIVITAMIN PO), Take 1 tablet by mouth daily, Disp: , Rfl:   •  nitrofurantoin (MACROBID) 100 mg capsule, Take 1 capsule (100 mg total) by mouth 2 (two) times a day for 5 days, Disp: 10 capsule, Rfl: 0  •  scopolamine (TRANSDERM-SCOP) 1 mg/3 days TD 72 hr patch, Apply 1 patch behind ear 8 hours prior to boarding plane, Disp: 4 patch, Rfl: 0    Current Allergies     Allergies as of 07/16/2023 - Reviewed 07/16/2023   Allergen Reaction Noted   • Sulfa antibiotics Rash 09/04/2014            The following portions of the patient's history were reviewed and updated as appropriate: allergies, current medications, past family history, past medical history, past social history, past surgical history and problem list.     Past Medical History:   Diagnosis Date   • Allergic rhinitis    • Arthritis     Osteoarthritis in right hand   • Benign paroxysmal vertigo    • BRCA1 negative    • BRCA2 negative    • Breast cancer ([address]) 2014    rt breast   • Bunion, left foot     OR correction today 6/25/2021   • Cancer ([address]) 08/11/2014    Dcis rt breast   • Fracture of phalanx of toe 07/21/2015    Last assessed   • GERD (gastroesophageal reflux disease)    • History of radiation exposure    • PONV (postoperative nausea and vomiting)    • Tendinitis 04/13/2015    Last assessed   • Wears glasses        Past Surgical History:   Procedure Laterality Date   • BREAST LUMPECTOMY W/ NEEDLE LOCALIZATION Right 09/23/2014    Dr. Sherly Coleman   • COLPOSCOPY W/ BIOPSY / CURETTAGE     • ENDOMETRIAL ABLATION     • FOOT SURGERY  7/21   • ME HALLUX RIGIDUS W/CHEILECTOMY 1ST MP JT W/IMPLT Left 06/25/2021    Procedure: IMPLANT ARTHROPLASTY;  Surgeon: Idania Priest DPM;  Location: [facility];  Service: Podiatry       Family History   Problem Relation Age of Onset   • Ulcerative colitis Mother    • Heart disease Father    • Arthritis Maternal Grandmother    • Heart disease Maternal Grandmother    • Diabetes Maternal Grandmother    • Pancreatic cancer Maternal Grandfather    • Cancer Maternal Grandfather    • Lung cancer Paternal Grandmother    • Cancer Paternal Grandmother         Lung   • No Known Problems Paternal Grandfather    • Diabetes Son    • No Known Problems Son    • No Known Problems Son    • Diabetes Maternal Aunt    • Arthritis Maternal Aunt    • Diabetes Maternal Aunt    • No Known Problems Paternal Aunt    • Breast cancer Family    • Diabetes Family          Medications have been verified. Objective   /72   Pulse 80   Temp 98.3 °F (36.8 °C)   Resp 20   Wt 74.2 kg (163 lb 9.6 oz)   SpO2 98%   BMI 28.98 kg/m²   No LMP recorded. Physical Exam     Physical Exam  Vitals reviewed. Constitutional:       General: She is not in acute distress. Appearance: She is well-developed. She is not diaphoretic. Cardiovascular:      Rate and Rhythm: Normal rate and regular rhythm. Heart sounds: Normal heart sounds. No murmur heard. No friction rub. No gallop. Pulmonary:      Effort: Pulmonary effort is normal. No respiratory distress. Breath sounds: Normal breath sounds. No wheezing, rhonchi or rales. Abdominal:      Palpations: Abdomen is soft. Tenderness: There is no abdominal tenderness. Comments: Negative CVA tenderness. Skin:     General: Skin is warm. Neurological:      Mental Status: She is alert. Psychiatric:         Behavior: Behavior normal.         Thought Content:  Thought content normal.         Judgment: Judgment normal.

## 2023-07-19 LAB — BACTERIA UR CULT: ABNORMAL

## 2023-08-14 ENCOUNTER — HOSPITAL ENCOUNTER (OUTPATIENT)
Dept: MAMMOGRAPHY | Facility: HOSPITAL | Age: 53
Discharge: HOME | End: 2023-08-14
Payer: COMMERCIAL

## 2023-08-14 DIAGNOSIS — Z12.31 ENCOUNTER FOR SCREENING MAMMOGRAM FOR MALIGNANT NEOPLASM OF BREAST: ICD-10-CM

## 2023-08-14 PROCEDURE — 77063 BREAST TOMOSYNTHESIS BI: CPT

## 2023-08-14 PROCEDURE — 77067 SCR MAMMO BI INCL CAD: CPT

## 2023-08-15 DIAGNOSIS — M79.671 RIGHT FOOT PAIN: ICD-10-CM

## 2023-09-13 ENCOUNTER — OFFICE VISIT (OUTPATIENT)
Dept: OBGYN CLINIC | Facility: CLINIC | Age: 53
End: 2023-09-13
Payer: COMMERCIAL

## 2023-09-13 VITALS
DIASTOLIC BLOOD PRESSURE: 68 MMHG | HEIGHT: 63 IN | WEIGHT: 165 LBS | BODY MASS INDEX: 29.23 KG/M2 | SYSTOLIC BLOOD PRESSURE: 102 MMHG

## 2023-09-13 DIAGNOSIS — Z85.3 HISTORY OF RIGHT BREAST CANCER: ICD-10-CM

## 2023-09-13 DIAGNOSIS — Z01.419 ENCOUNTER FOR WELL WOMAN EXAM WITH ROUTINE GYNECOLOGICAL EXAM: Primary | ICD-10-CM

## 2023-09-13 PROCEDURE — G0145 SCR C/V CYTO,THINLAYER,RESCR: HCPCS | Performed by: OBSTETRICS & GYNECOLOGY

## 2023-09-13 PROCEDURE — S0612 ANNUAL GYNECOLOGICAL EXAMINA: HCPCS | Performed by: OBSTETRICS & GYNECOLOGY

## 2023-09-13 NOTE — PROGRESS NOTES
ASSESSMENT & PLAN: Saima Rivera is a 48 y.o. [id number] with normal gynecologic exam.    1.  Routine well woman exam done today  2. Pap and HPV:  The patient's last pap and hpv was 2022. It was normal.  Pap and cotesting was done today. Current ASCCP Guidelines reviewed. Requesting annual paps  3. Mammogram ordered  4. The following were reviewed in today's visit: breast self exam  5. Sees Dr. Vikas Rodriguez - DCIS    CC:  Annual Gynecologic Examination    HPI: Saima Rivera is a 48 y.o. [id number]  who presents for annual gynecologic examination. She has the following concerns:      Health Maintenance:    She wears her seatbelt routinely. She does perform regular monthly self breast exams. She feels safe at home.      Patient Active Problem List   Diagnosis   • History of right breast cancer   • Chronic fatigue   • Dermatitis   • GERD (gastroesophageal reflux disease)   • Greater trochanteric bursitis of right hip   • Pain, pelvic, female   • Seasonal allergies   • Encounter for follow-up examination after completed treatment for malignant neoplasm   • Pain of left breast   • Family history of cancer   • Heberden's node   • Hallux rigidus of left foot   • Intermittent diarrhea   • Encounter for hepatitis C screening test for low risk patient       Past Medical History:   Diagnosis Date   • Allergic rhinitis    • Arthritis     Osteoarthritis in right hand   • Benign paroxysmal vertigo    • BRCA1 negative    • BRCA2 negative    • Breast cancer ([address]) 2014    rt breast   • Bunion, left foot     OR correction today 6/25/2021   • Cancer ([address]) 08/11/2014    Dcis rt breast   • Fracture of phalanx of toe 07/21/2015    Last assessed   • GERD (gastroesophageal reflux disease)    • History of radiation exposure    • PONV (postoperative nausea and vomiting)    • Tendinitis 04/13/2015    Last assessed   • Wears glasses        Past Surgical History:   Procedure Laterality Date   • BREAST LUMPECTOMY W/ NEEDLE LOCALIZATION Right 2014    Dr. Juanita Pathak   • COLPOSCOPY W/ BIOPSY / CURETTAGE     • ENDOMETRIAL ABLATION     • FOOT SURGERY     • [place] HALLUX RIGIDUS W/CHEILECTOMY 1ST MP JT W/IMPLT Left 2021    Procedure: IMPLANT ARTHROPLASTY;  Surgeon: Alysia Welsh DPM;  Location: [facility];  Service: Podiatry       Past OB/Gyn History:  OB History        3    Para   3    Term   3            AB        Living   3       SAB        IAB        Ectopic        Multiple        Live Births   3                  Pt does not have menstrual issues. .  History of sexually transmitted infection: No.  History of abnormal pap smears: No .    Patient is currently sexually active. heterosexual. The current method of family planning is none. Family History   Problem Relation Age of Onset   • Ulcerative colitis Mother    • Heart disease Father    • Arthritis Maternal Grandmother    • Heart disease Maternal Grandmother    • Diabetes Maternal Grandmother    • Pancreatic cancer Maternal Grandfather    • Cancer Maternal Grandfather    • Lung cancer Paternal Grandmother    • Cancer Paternal Grandmother         Lung   • No Known Problems Paternal Grandfather    • Diabetes Son    • No Known Problems Son    • No Known Problems Son    • Diabetes Maternal Aunt    • Arthritis Maternal Aunt    • Diabetes Maternal Aunt    • No Known Problems Paternal Aunt    • Breast cancer Family    • Diabetes Family        Social History:  Social History     Socioeconomic History   • Marital status: /Civil Union     Spouse name: Not on file   • Number of children: Not on file   • Years of education: Not on file   • Highest education level: Not on file   Occupational History   • Not on file   Tobacco Use   • Smoking status: Never   • Smokeless tobacco: Never   Vaping Use   • Vaping Use: Never used   Substance and Sexual Activity   • Alcohol use:  Yes     Alcohol/week: 1.0 standard drink of alcohol     Types: 1 Standard drinks or equivalent per week     Comment: social   2 x weekly   • Drug use: No   • Sexual activity: Yes     Partners: Male     Birth control/protection: Male Sterilization   Other Topics Concern   • Not on file   Social History Narrative   • Not on file     Social Determinants of Health     Financial Resource Strain: Not on file   Food Insecurity: Not on file   Transportation Needs: Not on file   Physical Activity: Not on file   Stress: Not on file   Social Connections: Not on file   Intimate Partner Violence: Not on file   Housing Stability: Not on file        Allergies   Allergen Reactions   • Sulfa Antibiotics Rash       Current Outpatient Medications:   •  diclofenac sodium (VOLTAREN) 50 mg EC tablet, Take 1 tablet (50 mg total) by mouth 2 (two) times a day, Disp: 60 tablet, Rfl: 0  •  loratadine (CLARITIN) 10 mg tablet, Take 10 mg by mouth daily, Disp: , Rfl:   •  Multiple Vitamin (DAILY VALUE MULTIVITAMIN PO), Take 1 tablet by mouth daily, Disp: , Rfl:   •  scopolamine (TRANSDERM-SCOP) 1 mg/3 days TD 72 hr patch, Apply 1 patch behind ear 8 hours prior to boarding plane, Disp: 4 patch, Rfl: 0  •  fluticasone (FLONASE) 50 mcg/act nasal spray, INHALE 2 SPRAYS VIA EACH NOSTRIL ONCE DAILY (Patient not taking: Reported on 9/13/2023), Disp: 48 g, Rfl: 0    Review of Systems:    Review of Systems  Constitutional :no fever, feels well, no tiredness, no recent weight gain or loss  ENT: no ear ache, no loss of hearing, no nosebleeds or nasal discharge, no sore throat or hoarseness. Cardiovascular: no complaints of slow or fast heart beat, no chest pain, no palpitations, no leg claudication or lower extremity edema.   Respiratory: no complaints of shortness of shortness of breath, no JEFF  Breasts:no complaints of breast pain, breast lump, or nipple discharge  Gastrointestinal: no complaints of abdominal pain, constipation, nausea, vomiting, or diarrhea or bloody stools  Genitourinary : no complaints of dysuria, incontinence, pelvic pain, no dysmenorrhea, vaginal discharge or abnormal vaginal bleeding and as noted in HPI. Musculoskeletal: no complaints of arthralgia, no myalgia, no joint swelling or stiffness, no limb pain or swelling. Integumentary: no complaints of skin rash or lesion, itching or dry skin  Neurological: no complaints of headache, no confusion, no numbness or tingling, no dizziness or fainting    Objective      /68   Ht 5' 3" (1.6 m)   Wt 74.8 kg (165 lb)   BMI 29.23 kg/m²     General:   appears stated age, cooperative, alert normal mood and affect   Neck: normal, supple,trachea midline, no masses   Heart: regular rate and rhythm, S1, S2 normal, no murmur, click, rub or gallop   Lungs: clear to auscultation bilaterally   Breasts: normal appearance, no masses or tenderness, Inspection negative, No nipple retraction or dimpling, No nipple discharge or bleeding, No axillary or supraclavicular adenopathy, Normal to palpation without dominant masses, scar from prior biopsy noted right breast   Abdomen: soft, non-tender, without masses or organomegaly   Vulva: normal, normal female genitalia, Bartholin's, Urethra, [place] normal, no lesions, normal female hair distribution   Vagina: normal vagina, no discharge, exudate, lesion, or erythema   Urethra: normal   Cervix: Normal, no discharge. PAP done. Uterus: normal size, contour, position, consistency, mobility, non-tender   Adnexa: normal adnexa   Lymphatic palpation of lymph nodes in neck, axilla, groin and/or other locations: no lymphadenopathy or masses noted   Skin normal skin turgor and no rashes.    Psychiatric orientation to person, place, and time: normal. mood and affect: normal

## 2023-09-18 DIAGNOSIS — M79.671 RIGHT FOOT PAIN: ICD-10-CM

## 2023-09-21 LAB
LAB AP GYN PRIMARY INTERPRETATION: NORMAL
Lab: NORMAL

## 2023-09-27 ENCOUNTER — OFFICE VISIT (OUTPATIENT)
Dept: SURGICAL ONCOLOGY | Facility: CLINIC | Age: 53
End: 2023-09-27
Payer: COMMERCIAL

## 2023-09-27 VITALS
OXYGEN SATURATION: 96 % | SYSTOLIC BLOOD PRESSURE: 106 MMHG | HEIGHT: 63 IN | RESPIRATION RATE: 16 BRPM | DIASTOLIC BLOOD PRESSURE: 70 MMHG | BODY MASS INDEX: 29.06 KG/M2 | HEART RATE: 86 BPM | WEIGHT: 164 LBS | TEMPERATURE: 97.4 F

## 2023-09-27 DIAGNOSIS — Z85.3 HISTORY OF RIGHT BREAST CANCER: ICD-10-CM

## 2023-09-27 DIAGNOSIS — Z80.9 FAMILY HISTORY OF CANCER: ICD-10-CM

## 2023-09-27 DIAGNOSIS — Z12.31 VISIT FOR SCREENING MAMMOGRAM: ICD-10-CM

## 2023-09-27 DIAGNOSIS — Z08 ENCOUNTER FOR FOLLOW-UP EXAMINATION AFTER COMPLETED TREATMENT FOR MALIGNANT NEOPLASM: Primary | ICD-10-CM

## 2023-09-27 PROCEDURE — 99213 OFFICE O/P EST LOW 20 MIN: CPT

## 2023-09-27 NOTE — PROGRESS NOTES
Surgical Oncology Follow Up       [address] [facility] SURG[facility] ONCOLOGY [place]  [address]  [facility] [address]    Nereyda Ramírez  1970  [id number]  [address] [facility] SURGICAL ONCOLOGY [facility] [address]    Chief Complaint   Patient presents with   • Follow-up       Assessment/Plan:  1. Encounter for follow-up examination after completed treatment for malignant neoplasm  - 1 year follow up    2. History of right breast cancer    3. Family history of cancer    4. Visit for screening mammogram  - Mammo screening bilateral w 3d & cad; Future      Discussion/Summary: Patient is a 61-year-old female presenting today for 1 year follow-up for right breast cancer diagnosed in August 2014. Pathology revealed DCIS. She underwent a right lumpectomy and completed whole breast radiation therapy. She had genetic testing which was negative. She is currently on observation. She had a bilateral screening mammogram on 08/14/2023 which was BIRADS 2 category 1 density. There were no concerns on her breast exam. I will see the patient back in 1 year or sooner should the need arise. She was instructed to call with any questions or concerns prior to this time. All questions were answered today.       History of Present Illness:     Oncology History   History of right breast cancer   8/25/2014 Initial Diagnosis    Ductal carcinoma in situ (DCIS) of right breast     9/23/2014 Surgery    Right lumpectomy (Dr. Crescencio Montes)      - 12/16/2014 Radiation    WBRT (Dr. Del Lewis, [place], [place])     2015 Genetic Testing    BRCA1, BRCA2 negative          -Interval History: Patient is a 61-year-old female presenting today for 1 year follow-up for right breast cancer diagnosed in August 2014. She is currently on observation. She had a bilateral screening mammogram on 08/14/2023 which was BIRADS 2 category 1 density. Patient denies changes on her breast exam. She denies persistent headaches, bone pain, back pain, shortness of breath, or abdominal pain. Review of Systems:  Review of Systems   Constitutional: Negative for activity change, appetite change, fatigue and unexpected weight change. Respiratory: Negative for cough and shortness of breath. Cardiovascular: Negative for chest pain. Gastrointestinal: Negative for abdominal pain, diarrhea, nausea and vomiting. Endocrine: Negative for heat intolerance. Musculoskeletal: Negative for arthralgias, back pain and myalgias. Skin: Negative for rash. Neurological: Negative for weakness and headaches. Hematological: Negative for adenopathy.        Patient Active Problem List   Diagnosis   • History of right breast cancer   • Chronic fatigue   • Dermatitis   • GERD (gastroesophageal reflux disease)   • Greater trochanteric bursitis of right hip   • Pain, pelvic, female   • Seasonal allergies   • Encounter for follow-up examination after completed treatment for malignant neoplasm   • Pain of left breast   • Family history of cancer   • Heberden's node   • Hallux rigidus of left foot   • Intermittent diarrhea   • Encounter for hepatitis C screening test for low risk patient     Past Medical History:   Diagnosis Date   • Allergic rhinitis    • Arthritis     Osteoarthritis in right hand   • Benign paroxysmal vertigo    • BRCA1 negative    • BRCA2 negative    • Breast cancer ([address]) 2014    rt breast   • Bunion, left foot     OR correction today 6/25/2021   • Cancer ([address]) 08/11/2014    Dcis rt breast   • Fracture of phalanx of toe 07/21/2015    Last assessed   • GERD (gastroesophageal reflux disease)    • History of radiation exposure    • PONV (postoperative nausea and vomiting)    • Tendinitis 04/13/2015    Last assessed   • Wears glasses      Past Surgical History:   Procedure Laterality Date   • BREAST LUMPECTOMY W/ NEEDLE LOCALIZATION Right 09/23/2014    Dr. Michele Emmanuel   • COLPOSCOPY W/ BIOPSY / CURETTAGE     • ENDOMETRIAL ABLATION     • FOOT SURGERY  7/21   • [place] HALLUX RIGIDUS W/CHEILECTOMY 1ST MP JT W/IMPLT Left 06/25/2021    Procedure: IMPLANT ARTHROPLASTY;  Surgeon: Fran Vallejo DPM;  Location: [facility];  Service: Podiatry     Family History   Problem Relation Age of Onset   • Ulcerative colitis Mother    • Heart disease Father    • Arthritis Maternal Grandmother    • Heart disease Maternal Grandmother    • Diabetes Maternal Grandmother    • Pancreatic cancer Maternal Grandfather    • Cancer Maternal Grandfather    • Lung cancer Paternal Grandmother    • Cancer Paternal Grandmother         Lung   • No Known Problems Paternal Grandfather    • Diabetes Son    • No Known Problems Son    • No Known Problems Son    • Diabetes Maternal Aunt    • Arthritis Maternal Aunt    • Diabetes Maternal Aunt    • No Known Problems Paternal Aunt    • Breast cancer Family    • Diabetes Family      Social History     Socioeconomic History   • Marital status: /Civil Union     Spouse name: Not on file   • Number of children: Not on file   • Years of education: Not on file   • Highest education level: Not on file   Occupational History   • Not on file   Tobacco Use   • Smoking status: Never   • Smokeless tobacco: Never   Vaping Use   • Vaping Use: Never used   Substance and Sexual Activity   • Alcohol use:  Yes     Alcohol/week: 1.0 standard drink of alcohol     Types: 1 Standard drinks or equivalent per week     Comment: social   2 x weekly   • Drug use: No   • Sexual activity: Yes     Partners: Male     Birth control/protection: Male Sterilization   Other Topics Concern   • Not on file   Social History Narrative   • Not on file     Social Determinants of Health     Financial Resource Strain: Not on file   Food Insecurity: Not on file   Transportation Needs: Not on file   Physical Activity: Not on file   Stress: Not on file   Social Connections: Not on file   Intimate Partner Violence: Not on file   Housing Stability: Not on file       Current Outpatient Medications:   •  diclofenac sodium (VOLTAREN) 50 mg EC tablet, Take 1 tablet (50 mg total) by mouth 2 (two) times a day, Disp: 60 tablet, Rfl: 0  •  loratadine (CLARITIN) 10 mg tablet, Take 10 mg by mouth daily, Disp: , Rfl:   •  Multiple Vitamin (DAILY VALUE MULTIVITAMIN PO), Take 1 tablet by mouth daily, Disp: , Rfl:   •  scopolamine (TRANSDERM-SCOP) 1 mg/3 days TD 72 hr patch, Apply 1 patch behind ear 8 hours prior to boarding plane, Disp: 4 patch, Rfl: 0  Allergies   Allergen Reactions   • Sulfa Antibiotics Rash     Vitals:    09/27/23 1417   Temp: (!) 97.4 °F (36.3 °C)       Physical Exam  Constitutional:       General: She is not in acute distress. Appearance: Normal appearance. Cardiovascular:      Rate and Rhythm: Normal rate and regular rhythm. Pulses: Normal pulses. Heart sounds: Normal heart sounds. Pulmonary:      Effort: Pulmonary effort is normal.      Breath sounds: Normal breath sounds. Chest:      Chest wall: No mass. Breasts:     Right: No swelling, bleeding, inverted nipple, mass, nipple discharge, skin change or tenderness. Left: No swelling, bleeding, inverted nipple, mass, nipple discharge, skin change or tenderness. Abdominal:      General: Abdomen is flat. Palpations: Abdomen is soft. Lymphadenopathy:      Upper Body:      Right upper body: No supraclavicular, axillary or pectoral adenopathy. Left upper body: No supraclavicular, axillary or pectoral adenopathy. Skin:     General: Skin is warm. Neurological:      General: No focal deficit present. Mental Status: She is alert and oriented to person, place, and time. Psychiatric:         Mood and Affect: Mood normal.         Behavior: Behavior normal.           Results:    Imaging  No results found. I reviewed the above imaging data.       Advance Care Planning/Advance Directives:  Discussed disease status, cancer treatment plans and/or cancer treatment goals with the patient.

## 2023-10-31 DIAGNOSIS — M79.671 RIGHT FOOT PAIN: ICD-10-CM

## 2023-11-27 ENCOUNTER — TELEPHONE (OUTPATIENT)
Dept: RHEUMATOLOGY | Facility: CLINIC | Age: 53
End: 2023-11-27

## 2023-11-27 ENCOUNTER — TELEPHONE (OUTPATIENT)
Age: 53
End: 2023-11-27

## 2023-11-27 NOTE — TELEPHONE ENCOUNTER
Patient was called and a VM was left asking her to return the call so she could be set up with a new patient appointment in our [facility] office.

## 2023-12-12 DIAGNOSIS — M79.671 RIGHT FOOT PAIN: ICD-10-CM

## 2023-12-12 NOTE — TELEPHONE ENCOUNTER
Pt called req refill on 12/8 but Ttius Clancy didn't get to is-asking for refill today as she is out of medication

## 2024-01-02 ENCOUNTER — OFFICE VISIT (OUTPATIENT)
Dept: RHEUMATOLOGY | Facility: CLINIC | Age: 54
End: 2024-01-02
Payer: COMMERCIAL

## 2024-01-02 VITALS
DIASTOLIC BLOOD PRESSURE: 68 MMHG | WEIGHT: 164 LBS | HEIGHT: 63 IN | OXYGEN SATURATION: 98 % | HEART RATE: 85 BPM | BODY MASS INDEX: 29.06 KG/M2 | SYSTOLIC BLOOD PRESSURE: 106 MMHG

## 2024-01-02 DIAGNOSIS — M25.50 MULTIPLE JOINT PAIN: ICD-10-CM

## 2024-01-02 PROCEDURE — 99214 OFFICE O/P EST MOD 30 MIN: CPT | Performed by: INTERNAL MEDICINE

## 2024-01-02 NOTE — PROGRESS NOTES
This is a Rheumatology Consult seen at the request of Dr. Michel      HPI: Emily Vera is a 54 y/o female who presents for further evaluation chronic arthralgias. She has past medical history breast CA (s/p surgery and radiation), GERD.    Chronic arthralgias, b/l hands    She was rxd oral Voltaren and it helps. Intolerant mobic with GI issues. Alleve did not help    Chronic low back pain/DDD    Occasional lateral hip pain (she does stand a lot at school)    H/o b/l big toe pain right >> left. She follows with [facility] podiatr and has had left big toe surgery. X-rays feet c/w osteoarthritis    No known h/o rashes, psoriasis, inflammatory bowel disease.     Possible IBS        --------------------------------------------------------------------------------------------------------        ROS:      - for: Fevers, Chills or sweats.  No HAs or scalp tenderness.  No jaw claudication.  No acute visual or eye changes.  No dry eyes.  No auditory complaints.  No oral lesions or ulcers.  No dry mouth.  No sore throat or cough.  No chest pains or palpitations.  No shortness of breath, dyspnea on exertion or wheezing.  No hemotpysis.  No abdominal pain, GERD symptoms, diarrhea or constipation.  No urinary complaints.  No numbness, tingling or weakness.  No rashes.    All other ROS was reviewed and negative except as above         --------------------------------------------------------------------------------------------------------    Past Medical History    Past Medical History:   Diagnosis Date    Allergic rhinitis     Arthritis     Osteoarthritis in right hand    Benign paroxysmal vertigo     BRCA1 negative     BRCA2 negative     Breast cancer ([facility]) 2014    rt breast    Bunion, left foot     OR correction today 6/25/2021    Cancer ([facility]) 08/11/2014    Dcis rt breast    Fracture of phalanx of toe 07/21/2015    Last assessed    GERD (gastroesophageal reflux disease)     History of radiation exposure     PONV  (postoperative nausea and vomiting)     Tendinitis 04/13/2015    Last assessed    Wears glasses            Past Surgical History    Past Surgical History:   Procedure Laterality Date    BREAST LUMPECTOMY W/ NEEDLE LOCALIZATION Right 09/23/2014    Dr. Prieto    COLPOSCOPY W/ BIOPSY / CURETTAGE      ENDOMETRIAL ABLATION      FOOT SURGERY  7/21    [place] HALLUX RIGIDUS W/CHEILECTOMY 1ST MP JT W/IMPLT Left 06/25/2021    Procedure: IMPLANT ARTHROPLASTY;  Surgeon: Oscar Dawson DPM;  Location: [facility];  Service: Podiatry           Family History    Family History   Problem Relation Age of Onset    Ulcerative colitis Mother     Heart disease Father     Arthritis Maternal Grandmother     Heart disease Maternal Grandmother     Diabetes Maternal Grandmother     Pancreatic cancer Maternal Grandfather     Cancer Maternal Grandfather     Lung cancer Paternal Grandmother     Cancer Paternal Grandmother         Lung    No Known Problems Paternal Grandfather     Diabetes Son     No Known Problems Son     No Known Problems Son     Diabetes Maternal Aunt     Arthritis Maternal Aunt     Diabetes Maternal Aunt     No Known Problems Paternal Aunt     Breast cancer Family     Diabetes Family       Grandmother and aunt with arthritis      Social History    Social History     Tobacco Use    Smoking status: Never    Smokeless tobacco: Never   Vaping Use    Vaping status: Never Used   Substance Use Topics    Alcohol use: Yes     Alcohol/week: 1.0 standard drink of alcohol     Types: 1 Standard drinks or equivalent per week     Comment: social   2 x weekly    Drug use: No     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  Patient is well developed.  HEENT:  Pupils are equal, round and reactive.  Sclera are clear.  Fundoscopic exam is normal.  External ears are without lesions.  Oral pharynx is clear of ulcers or other lesions.  MMM.   NECK:  Supple.  There is no adenopathy appreciable in anterior or posterior cervical chains or supraclavicularly.  JVP is normal.    HEART: Regular rate and rhythm.  There is no appreciable murmur, gallop or rub.  LUNGS: Clear to auscultation.  ABD:  Soft, without tenderness, rebound or guarding.  No appreciable organomegally.  NEURO: Speech and cognition are normal.  Strength is 5/5 throughout.  Tone is normal.  DTRs are 2/4 at the knees, ankles and elbows.  Gait is normal.  SKIN: There are no rashes or lesions    MUSCULOSKELETAL:   + berta OA      ________________________________________________________________________          Results Review    Component      Latest Ref Rng 10/11/2022 11/11/2022   C-REACTIVE PROTEIN      <3.0 mg/L 7.1 (H)     CYCLIC CITRULLINATED PEPTIDE ANTIBODY      See comment   0.9    Lyme Total Antibodies      0.2 - 8.0 AI  0.3    RHEUMATOID FACTOR      Negative   Negative    JM      Negative   Negative       Legend:  (H) High      Impressions and Plans:    Emily Vera is a 52 y/o female with clinical s/s osteoarthritis with classical berta OA hands    I have reviewed OA and provided handouts    Recommend NSAIDs/tylenol as needed    Serologies including JM , RF, CCP all negative    CRP mildly elevated (likely not significant)    I have reviewed x-rays c/w osteoarthritis    At this time no indication for immune suppression    RTC as needed            Thank you for involving me in this patient's " care.        Dex Colby MD  Department of Rheumatology  [facility]

## 2024-01-13 DIAGNOSIS — M79.671 RIGHT FOOT PAIN: ICD-10-CM

## 2024-02-16 DIAGNOSIS — M79.671 RIGHT FOOT PAIN: ICD-10-CM

## 2024-03-19 DIAGNOSIS — M79.671 RIGHT FOOT PAIN: ICD-10-CM

## 2024-04-24 DIAGNOSIS — M79.671 RIGHT FOOT PAIN: ICD-10-CM

## 2024-05-27 DIAGNOSIS — M79.671 RIGHT FOOT PAIN: ICD-10-CM

## 2024-06-05 ENCOUNTER — LAB (OUTPATIENT)
Dept: LAB | Facility: MEDICAL CENTER | Age: 54
End: 2024-06-05
Payer: COMMERCIAL

## 2024-06-05 DIAGNOSIS — Z00.8 HEALTH EXAMINATION IN POPULATION SURVEY: ICD-10-CM

## 2024-06-05 LAB
CHOLEST SERPL-MCNC: 250 MG/DL
EST. AVERAGE GLUCOSE BLD GHB EST-MCNC: 108 MG/DL
HBA1C MFR BLD: 5.4 %
HDLC SERPL-MCNC: 56 MG/DL
LDLC SERPL CALC-MCNC: 168 MG/DL (ref 0–100)
NONHDLC SERPL-MCNC: 194 MG/DL
TRIGL SERPL-MCNC: 128 MG/DL

## 2024-06-05 PROCEDURE — 83036 HEMOGLOBIN GLYCOSYLATED A1C: CPT

## 2024-06-05 PROCEDURE — 36415 COLL VENOUS BLD VENIPUNCTURE: CPT

## 2024-06-05 PROCEDURE — 80061 LIPID PANEL: CPT

## 2024-06-06 NOTE — RESULT ENCOUNTER NOTE
Please call the patient regarding her abnormal result.  Make sure she knows that her cholesterol is elevated not sure if we ordered this or if this was done by someone else and they are going to act on it but she should follow a low-fat low-cholesterol diet and if she really is already on the best diet she can do then she should probably be on a statin

## 2024-07-08 DIAGNOSIS — M79.671 RIGHT FOOT PAIN: ICD-10-CM

## 2024-08-16 ENCOUNTER — HOSPITAL ENCOUNTER (OUTPATIENT)
Dept: MAMMOGRAPHY | Facility: HOSPITAL | Age: 54
Discharge: HOME | End: 2024-08-16
Payer: COMMERCIAL

## 2024-08-16 DIAGNOSIS — Z12.31 VISIT FOR SCREENING MAMMOGRAM: ICD-10-CM

## 2024-08-16 PROCEDURE — 77067 SCR MAMMO BI INCL CAD: CPT

## 2024-08-16 PROCEDURE — 77063 BREAST TOMOSYNTHESIS BI: CPT

## 2024-08-24 DIAGNOSIS — M79.671 RIGHT FOOT PAIN: ICD-10-CM

## 2024-09-25 DIAGNOSIS — M79.671 RIGHT FOOT PAIN: ICD-10-CM

## 2024-09-30 ENCOUNTER — OFFICE VISIT (OUTPATIENT)
Dept: SURGICAL ONCOLOGY | Facility: CLINIC | Age: 54
End: 2024-09-30
Payer: COMMERCIAL

## 2024-09-30 VITALS
HEART RATE: 90 BPM | TEMPERATURE: 98 F | WEIGHT: 177 LBS | HEIGHT: 63 IN | DIASTOLIC BLOOD PRESSURE: 82 MMHG | SYSTOLIC BLOOD PRESSURE: 120 MMHG | OXYGEN SATURATION: 96 % | RESPIRATION RATE: 16 BRPM | BODY MASS INDEX: 31.36 KG/M2

## 2024-09-30 DIAGNOSIS — Z08 ENCOUNTER FOR FOLLOW-UP EXAMINATION AFTER COMPLETED TREATMENT FOR MALIGNANT NEOPLASM: Primary | ICD-10-CM

## 2024-09-30 DIAGNOSIS — Z85.3 HISTORY OF RIGHT BREAST CANCER: ICD-10-CM

## 2024-09-30 DIAGNOSIS — Z12.31 VISIT FOR SCREENING MAMMOGRAM: ICD-10-CM

## 2024-09-30 PROCEDURE — 99213 OFFICE O/P EST LOW 20 MIN: CPT

## 2024-09-30 NOTE — PROGRESS NOTES
Surgical Oncology Follow Up       [address] [facility] SURGICAL ONCOLOGY YANN  [address] [facility]  [facility] [address]    Sandi Vera  1970  [id number]  [address] [facility] SURGICAL ONCOLOGY YANN  [address] [facility] Hospital[facility]  [facility] [address]    Chief Complaint   Patient presents with    Follow-up       Assessment/Plan:  1. Encounter for follow-up examination after completed treatment for malignant neoplasm  -  PRN    2. History of right breast cancer    3. Visit for screening mammogram  - Mammo screening bilateral w 3d and cad; Future       Discussion/Summary: Patient is a 54-year-old female presenting today for 1 year follow-up for right breast cancer diagnosed in August 2014. Pathology revealed DCIS. She underwent a right lumpectomy and completed whole breast radiation therapy. She had genetic testing which was negative. She is currently on observation. She had a bilateral screening mammogram on 08/16/2024 which was BIRADS 2 category 2 density. There were no concerns on her breast exam. This is her 10 year visit. She was instructed to call with concerns if anything changes in the future. All questions were answered today.       History of Present Illness:     Oncology History   History of right breast cancer   8/25/2014 Initial Diagnosis    Ductal carcinoma in situ (DCIS) of right breast     9/23/2014 Surgery    Right lumpectomy (Dr. Prieto)      - 12/16/2014 Radiation    WBRT (Dr. Guardado- [facility], [place], [place])     2015 Genetic Testing    BRCA1, BRCA2 negative          -Interval History: Patient is a 54-year-old female presenting today for 1 year follow-up for right breast cancer diagnosed in August 2014. She is currently on observation. She had a bilateral screening mammogram on 08/16/2024 which was BIRADS 2 category 2 density. She has arthralgias. Patient denies changes on her breast exam. She denies persistent  headaches, bone pain, back pain, shortness of breath, or abdominal pain.      Review of Systems:  Review of Systems   Constitutional:  Negative for activity change, appetite change, fatigue and unexpected weight change.   Respiratory:  Negative for cough and shortness of breath.    Cardiovascular:  Negative for chest pain.   Gastrointestinal:  Negative for abdominal pain, diarrhea, nausea and vomiting.   Endocrine: Negative for heat intolerance.   Musculoskeletal:  Positive for arthralgias. Negative for back pain and myalgias.   Skin:  Negative for rash.   Neurological:  Negative for weakness and headaches.   Hematological:  Negative for adenopathy.       Patient Active Problem List   Diagnosis    History of right breast cancer    Chronic fatigue    Dermatitis    GERD (gastroesophageal reflux disease)    Greater trochanteric bursitis of right hip    Pain, pelvic, female    Seasonal allergies    Encounter for follow-up examination after completed treatment for malignant neoplasm    Pain of left breast    Family history of cancer    Heberden's node    Hallux rigidus of left foot    Intermittent diarrhea    Encounter for hepatitis C screening test for low risk patient     Past Medical History:   Diagnosis Date    Allergic rhinitis     Arthritis     Osteoarthritis in right hand    Benign paroxysmal vertigo     BRCA1 negative     BRCA2 negative     Breast cancer (HCC) 2014    rt breast    Bunion, left foot     OR correction today 6/25/2021    Cancer (HCC) 08/11/2014    Dcis rt breast    Fracture of phalanx of toe 07/21/2015    Last assessed    GERD (gastroesophageal reflux disease)     History of radiation exposure     PONV (postoperative nausea and vomiting)     Tendinitis 04/13/2015    Last assessed    Wears glasses      Past Surgical History:   Procedure Laterality Date    BREAST LUMPECTOMY W/ NEEDLE LOCALIZATION Right 09/23/2014    Dr. Prieto    COLPOSCOPY W/ BIOPSY / CURETTAGE      ENDOMETRIAL ABLATION      FOOT  SURGERY  7/21    [place] HALLUX RIGIDUS W/CHEILECTOMY 1ST MP JT W/IMPLT Left 06/25/2021    Procedure: IMPLANT ARTHROPLASTY;  Surgeon: Oscar Dawson DPM;  Location: [facility];  Service: Podiatry     Family History   Problem Relation Age of Onset    Ulcerative colitis Mother     Heart disease Father     Arthritis Maternal Grandmother     Heart disease Maternal Grandmother     Diabetes Maternal Grandmother     Pancreatic cancer Maternal Grandfather     Cancer Maternal Grandfather     Lung cancer Paternal Grandmother     Cancer Paternal Grandmother         Lung    No Known Problems Paternal Grandfather     Diabetes Son     No Known Problems Son     No Known Problems Son     Diabetes Maternal Aunt     Arthritis Maternal Aunt     Diabetes Maternal Aunt     No Known Problems Paternal Aunt     Breast cancer Family     Diabetes Family      Social History     Socioeconomic History    Marital status: /Civil Union     Spouse name: Not on file    Number of children: Not on file    Years of education: Not on file    Highest education level: Not on file   Occupational History    Not on file   Tobacco Use    Smoking status: Never    Smokeless tobacco: Never   Vaping Use    Vaping status: Never Used   Substance and Sexual Activity    Alcohol use: Yes     Alcohol/week: 1.0 standard drink of alcohol     Types: 1 Standard drinks or equivalent per week     Comment: social   2 x weekly    Drug use: No    Sexual activity: Yes     Partners: Male     Birth control/protection: Male Sterilization   Other Topics Concern    Not on file   Social History Narrative    Not on file     Social Determinants of Health     Financial Resource Strain: Not on file   Food Insecurity: Not on file   Transportation Needs: Not on file   Physical Activity: Not on file   Stress: Not on file   Social Connections: Not on file   Intimate Partner Violence: Not on file   Housing Stability: Not on file       Current Outpatient Medications:     diclofenac sodium  (VOLTAREN) 50 mg EC tablet, Take 1 tablet (50 mg total) by mouth 2 (two) times a day, Disp: 60 tablet, Rfl: 0    loratadine (CLARITIN) 10 mg tablet, Take 10 mg by mouth daily, Disp: , Rfl:     Multiple Vitamin (DAILY VALUE MULTIVITAMIN PO), Take 1 tablet by mouth daily, Disp: , Rfl:     scopolamine (TRANSDERM-SCOP) 1 mg/3 days TD 72 hr patch, Apply 1 patch behind ear 8 hours prior to boarding plane (Patient taking differently: if needed Apply 1 patch behind ear 8 hours prior to boarding plane), Disp: 4 patch, Rfl: 0  Allergies   Allergen Reactions    Sulfa Antibiotics Rash     Vitals:    09/30/24 1502   BP: 120/82   Pulse: 90   Resp: 16   Temp: 98 °F (36.7 °C)   SpO2: 96%       Physical Exam  Constitutional:       General: She is not in acute distress.     Appearance: Normal appearance.   Cardiovascular:      Rate and Rhythm: Normal rate and regular rhythm.      Pulses: Normal pulses.      Heart sounds: Normal heart sounds.   Pulmonary:      Effort: Pulmonary effort is normal.      Breath sounds: Normal breath sounds.   Chest:      Chest wall: No mass.   Breasts:     Right: No swelling, bleeding, inverted nipple, mass, nipple discharge, skin change or tenderness.      Left: No swelling, bleeding, inverted nipple, mass, nipple discharge, skin change or tenderness.       Abdominal:      General: Abdomen is flat.      Palpations: Abdomen is soft.   Lymphadenopathy:      Upper Body:      Right upper body: No supraclavicular, axillary or pectoral adenopathy.      Left upper body: No supraclavicular, axillary or pectoral adenopathy.   Skin:     General: Skin is warm.   Neurological:      General: No focal deficit present.      Mental Status: She is alert and oriented to person, place, and time.   Psychiatric:         Mood and Affect: Mood normal.         Behavior: Behavior normal.           Results:    Imaging  No results found.    I reviewed the above imaging data.      Advance Care Planning/Advance  Directives:  Discussed disease status, cancer treatment plans and/or cancer treatment goals with the patient.

## 2024-10-07 ENCOUNTER — ANNUAL EXAM (OUTPATIENT)
Dept: OBGYN CLINIC | Facility: CLINIC | Age: 54
End: 2024-10-07
Payer: COMMERCIAL

## 2024-10-07 VITALS
SYSTOLIC BLOOD PRESSURE: 120 MMHG | DIASTOLIC BLOOD PRESSURE: 82 MMHG | HEIGHT: 63 IN | WEIGHT: 174.8 LBS | BODY MASS INDEX: 30.97 KG/M2

## 2024-10-07 DIAGNOSIS — Z01.419 ENCOUNTER FOR WELL WOMAN EXAM WITH ROUTINE GYNECOLOGICAL EXAM: Primary | ICD-10-CM

## 2024-10-07 DIAGNOSIS — Z85.3 HISTORY OF RIGHT BREAST CANCER: ICD-10-CM

## 2024-10-07 PROCEDURE — S0612 ANNUAL GYNECOLOGICAL EXAMINA: HCPCS | Performed by: OBSTETRICS & GYNECOLOGY

## 2024-10-07 PROCEDURE — G0145 SCR C/V CYTO,THINLAYER,RESCR: HCPCS | Performed by: OBSTETRICS & GYNECOLOGY

## 2024-10-07 PROCEDURE — G0476 HPV COMBO ASSAY CA SCREEN: HCPCS | Performed by: OBSTETRICS & GYNECOLOGY

## 2024-10-07 NOTE — PROGRESS NOTES
ASSESSMENT & PLAN: Sandi Vera is a 54 y.o.  with normal gynecologic exam.    1.  Routine well woman exam done today  2.  Pap and HPV:  The patient's last pap and hpv was .    It was normal.  Pap and cotesting was done today.  Current ASCCP Guidelines reviewed. Requesting annual paps  3.  Mammogram ordered  4. The following were reviewed in today's visit: breast self exam  5. 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 Colon Cancer screening: colonoscopy     CC:  Annual Gynecologic Examination    HPI: Sandi Vera is a 54 y.o.   who presents for annual gynecologic examination.  She has the following concerns:  doing well, no GYN complaints;     Health Maintenance:    She wears her seatbelt routinely.    She does perform regular monthly self breast exams.    She feels safe at home.     Patient Active Problem List   Diagnosis    History of right breast cancer    Chronic fatigue    Dermatitis    GERD (gastroesophageal reflux disease)    Greater trochanteric bursitis of right hip    Pain, pelvic, female    Seasonal allergies    Encounter for follow-up examination after completed treatment for malignant neoplasm    Pain of left breast    Family history of cancer    Heberden's node    Hallux rigidus of left foot    Intermittent diarrhea    Encounter for hepatitis C screening test for low risk patient       Past Medical History:   Diagnosis Date    Allergic rhinitis     Arthritis     Osteoarthritis in right hand    Benign paroxysmal vertigo     BRCA1 negative     BRCA2 negative     Breast cancer (HCC)     rt breast    Bunion, left foot     OR correction today 2021    Cancer (HCC) 2014    Dcis rt breast    Fracture of phalanx of toe 2015    Last assessed    GERD (gastroesophageal reflux disease)     History of radiation exposure     PONV (postoperative nausea and vomiting)     Tendinitis 2015    Last assessed    Wears glasses        Past Surgical History:   Procedure  Laterality Date    BREAST LUMPECTOMY W/ NEEDLE LOCALIZATION Right 2014    Dr. Prieto    COLPOSCOPY W/ BIOPSY / CURETTAGE      ENDOMETRIAL ABLATION      FOOT SURGERY      [place] HALLUX RIGIDUS W/CHEILECTOMY 1ST MP JT W/IMPLT Left 2021    Procedure: IMPLANT ARTHROPLASTY;  Surgeon: Oscar Dawson DPM;  Location: [facility];  Service: Podiatry       Past OB/Gyn History:  OB History          3    Para   3    Term   3            AB        Living   3         SAB        IAB        Ectopic        Multiple        Live Births   3                  Pt does not have menstrual issues. .  History of sexually transmitted infection: No.  History of abnormal pap smears: No .    Patient is currently sexually active.  heterosexual. The current method of family planning is none.    Family History   Problem Relation Age of Onset    Ulcerative colitis Mother     Heart disease Father     Arthritis Maternal Grandmother     Heart disease Maternal Grandmother     Diabetes Maternal Grandmother     Pancreatic cancer Maternal Grandfather     Cancer Maternal Grandfather     Lung cancer Paternal Grandmother     Cancer Paternal Grandmother         Lung    No Known Problems Paternal Grandfather     Diabetes Son     No Known Problems Son     No Known Problems Son     Diabetes Maternal Aunt     Arthritis Maternal Aunt     Diabetes Maternal Aunt     No Known Problems Paternal Aunt     Breast cancer Family     Diabetes Family        Social History:  Social History     Socioeconomic History    Marital status: /Civil Union     Spouse name: Not on file    Number of children: Not on file    Years of education: Not on file    Highest education level: Not on file   Occupational History    Not on file   Tobacco Use    Smoking status: Never    Smokeless tobacco: Never   Vaping Use    Vaping status: Never Used   Substance and Sexual Activity    Alcohol use: Yes     Alcohol/week: 1.0 standard drink of alcohol     Types: 1 Standard  drinks or equivalent per week     Comment: social   2 x weekly    Drug use: No    Sexual activity: Yes     Partners: Male     Birth control/protection: Male Sterilization   Other Topics Concern    Not on file   Social History Narrative    Not on file     Social Determinants of Health     Financial Resource Strain: Not on file   Food Insecurity: Not on file   Transportation Needs: Not on file   Physical Activity: Not on file   Stress: Not on file   Social Connections: Not on file   Intimate Partner Violence: Not on file   Housing Stability: Not on file        Allergies   Allergen Reactions    Sulfa Antibiotics Rash       Current Outpatient Medications:     diclofenac sodium (VOLTAREN) 50 mg EC tablet, Take 1 tablet (50 mg total) by mouth 2 (two) times a day, Disp: 60 tablet, Rfl: 0    loratadine (CLARITIN) 10 mg tablet, Take 10 mg by mouth daily, Disp: , Rfl:     Multiple Vitamin (DAILY VALUE MULTIVITAMIN PO), Take 1 tablet by mouth daily, Disp: , Rfl:     scopolamine (TRANSDERM-SCOP) 1 mg/3 days TD 72 hr patch, Apply 1 patch behind ear 8 hours prior to boarding plane, Disp: 4 patch, Rfl: 0    Review of Systems:    Review of Systems  Constitutional :no fever, feels well, no tiredness, no recent weight gain or loss  ENT: no ear ache, no loss of hearing, no nosebleeds or nasal discharge, no sore throat or hoarseness.  Cardiovascular: no complaints of slow or fast heart beat, no chest pain, no palpitations, no leg claudication or lower extremity edema.  Respiratory: no complaints of shortness of shortness of breath, no JEFF  Breasts:no complaints of breast pain, breast lump, or nipple discharge  Gastrointestinal: no complaints of abdominal pain, constipation, nausea, vomiting, or diarrhea or bloody stools  Genitourinary : no complaints of dysuria, incontinence, pelvic pain, no dysmenorrhea, vaginal discharge or abnormal vaginal bleeding and as noted in HPI.  Musculoskeletal: no complaints of arthralgia, no myalgia, no  "joint swelling or stiffness, no limb pain or swelling.  Integumentary: no complaints of skin rash or lesion, itching or dry skin  Neurological: no complaints of headache, no confusion, no numbness or tingling, no dizziness or fainting    Objective      /82   Ht 5' 3\" (1.6 m)   Wt 79.3 kg (174 lb 12.8 oz)   BMI 30.96 kg/m²     General:   appears stated age, cooperative, alert normal mood and affect   Neck: normal, supple,trachea midline, no masses   Heart: regular rate and rhythm, S1, S2 normal, no murmur, click, rub or gallop   Lungs: clear to auscultation bilaterally   Breasts: normal appearance, no masses or tenderness, Inspection negative, No nipple retraction or dimpling, No nipple discharge or bleeding, No axillary or supraclavicular adenopathy, Normal to palpation without dominant masses, scar from prior biopsy noted right breast   Abdomen: soft, non-tender, without masses or organomegaly   Vulva: normal, normal female genitalia, Bartholin's, Urethra, [place] normal, no lesions, normal female hair distribution   Vagina: normal vagina, no discharge, exudate, lesion, or erythema   Urethra: normal   Cervix: Normal, no discharge. PAP done.   Uterus: normal size, contour, position, consistency, mobility, non-tender   Adnexa: normal adnexa   Lymphatic palpation of lymph nodes in neck, axilla, groin and/or other locations: no lymphadenopathy or masses noted   Skin normal skin turgor and no rashes.   Psychiatric orientation to person, place, and time: normal. mood and affect: normal     "

## 2024-10-10 LAB
LAB AP GYN PRIMARY INTERPRETATION: NORMAL
Lab: NORMAL

## 2024-10-31 DIAGNOSIS — M79.671 RIGHT FOOT PAIN: ICD-10-CM

## 2024-11-04 ENCOUNTER — OFFICE VISIT (OUTPATIENT)
Dept: FAMILY MEDICINE CLINIC | Facility: CLINIC | Age: 54
End: 2024-11-04
Payer: COMMERCIAL

## 2024-11-04 ENCOUNTER — APPOINTMENT (OUTPATIENT)
Dept: RADIOLOGY | Facility: MEDICAL CENTER | Age: 54
End: 2024-11-04
Payer: COMMERCIAL

## 2024-11-04 VITALS
SYSTOLIC BLOOD PRESSURE: 98 MMHG | HEART RATE: 75 BPM | TEMPERATURE: 97.7 F | BODY MASS INDEX: 31.18 KG/M2 | RESPIRATION RATE: 16 BRPM | DIASTOLIC BLOOD PRESSURE: 62 MMHG | OXYGEN SATURATION: 98 % | HEIGHT: 63 IN | WEIGHT: 176 LBS

## 2024-11-04 DIAGNOSIS — S16.1XXA ACUTE STRAIN OF NECK MUSCLE, INITIAL ENCOUNTER: Primary | ICD-10-CM

## 2024-11-04 DIAGNOSIS — S16.1XXA ACUTE STRAIN OF NECK MUSCLE, INITIAL ENCOUNTER: ICD-10-CM

## 2024-11-04 PROCEDURE — 72050 X-RAY EXAM NECK SPINE 4/5VWS: CPT

## 2024-11-04 PROCEDURE — 99213 OFFICE O/P EST LOW 20 MIN: CPT | Performed by: PHYSICIAN ASSISTANT

## 2024-11-04 RX ORDER — PREDNISONE 10 MG/1
TABLET ORAL
Qty: 20 TABLET | Refills: 0 | Status: SHIPPED | OUTPATIENT
Start: 2024-11-04

## 2024-11-04 NOTE — PROGRESS NOTES
"Ambulatory Visit  Name: Sandi Vera      : 1970      MRN: [id number]  Encounter Provider: Dior Cowan PA-C  Encounter Date: 2024   Encounter department: [facility]    Assessment & Plan  Acute strain of neck muscle, initial encounter    Orders:    XR spine cervical complete 4 or 5 vw non injury; Future    predniSONE 10 mg tablet; Days 1 and 2 take 4 tablets daily, days 3 and 4 take 3 tablets daily, days 5 and 6 and 2 tablets daily, days 7 and 8 take 1 tablet daily.      Depression Screening and Follow-up Plan: Patient was screened for depression during today's encounter. They screened negative with a PHQ-2 score of 0.      History of Present Illness     Emily is here today due to ongoing neck/upper back pain since 10/13. Pt unaware of specific injury, however states worsened after a sneeze. She takes diclofenac, does not feel that it is giving her relief. Pain is present without movement, worse with movement.          Review of Systems   Constitutional:  Negative for chills and fever.   HENT:  Negative for ear pain and sore throat.    Eyes:  Negative for pain and visual disturbance.   Respiratory:  Negative for cough and shortness of breath.    Cardiovascular:  Negative for chest pain and palpitations.   Gastrointestinal:  Negative for abdominal pain and vomiting.   Genitourinary:  Negative for dysuria and hematuria.   Musculoskeletal:  Positive for back pain and neck pain. Negative for arthralgias.   Skin:  Negative for color change and rash.   Neurological:  Negative for seizures and syncope.   All other systems reviewed and are negative.          Objective     BP 98/62 (BP Location: Left arm, Patient Position: Sitting, Cuff Size: Standard)   Pulse 75   Temp 97.7 °F (36.5 °C) (Temporal)   Resp 16   Ht 5' 3\" (1.6 m)   Wt 79.8 kg (176 lb)   SpO2 98%   BMI 31.18 kg/m²     Physical Exam  Vitals reviewed.   Constitutional:       General: She is not in acute distress.     " Appearance: She is well-developed. She is not diaphoretic.   HENT:      Head: Normocephalic and atraumatic.      Right Ear: Hearing, tympanic membrane, ear canal and external ear normal.      Left Ear: Hearing, tympanic membrane, ear canal and external ear normal.      Nose: Nose normal.      Mouth/Throat:      Mouth: Mucous membranes are moist.      Pharynx: Oropharynx is clear. Uvula midline. No oropharyngeal exudate.   Eyes:      General: No scleral icterus.        Right eye: No discharge.         Left eye: No discharge.      Conjunctiva/sclera: Conjunctivae normal.   Neck:      Thyroid: No thyromegaly.      Vascular: No carotid bruit.   Cardiovascular:      Rate and Rhythm: Normal rate and regular rhythm.      Heart sounds: Normal heart sounds. No murmur heard.  Pulmonary:      Effort: Pulmonary effort is normal. No respiratory distress.      Breath sounds: Normal breath sounds. No wheezing.   Abdominal:      General: Bowel sounds are normal. There is no distension.      Palpations: Abdomen is soft. There is no mass.      Tenderness: There is no abdominal tenderness. There is no guarding or rebound.   Musculoskeletal:         General: No tenderness. Normal range of motion.        Arms:       Cervical back: Neck supple.   Lymphadenopathy:      Cervical: No cervical adenopathy.   Skin:     General: Skin is warm and dry.      Findings: No erythema or rash.   Neurological:      Mental Status: She is alert and oriented to person, place, and time.   Psychiatric:         Behavior: Behavior normal.         Thought Content: Thought content normal.         Judgment: Judgment normal.

## 2024-12-04 DIAGNOSIS — M79.671 RIGHT FOOT PAIN: ICD-10-CM

## 2025-01-08 DIAGNOSIS — M79.671 RIGHT FOOT PAIN: ICD-10-CM

## 2025-02-11 DIAGNOSIS — M79.671 RIGHT FOOT PAIN: ICD-10-CM

## 2025-03-18 DIAGNOSIS — M79.671 RIGHT FOOT PAIN: ICD-10-CM

## 2025-03-19 NOTE — TELEPHONE ENCOUNTER
Patient needs updated blood work. Please place orders. A courtesy refill was provided.       Pt needs gfr labs

## 2025-04-22 DIAGNOSIS — M79.671 RIGHT FOOT PAIN: ICD-10-CM

## 2025-05-18 DIAGNOSIS — T75.3XXA MOTION SICKNESS, INITIAL ENCOUNTER: ICD-10-CM

## 2025-05-18 DIAGNOSIS — M79.671 RIGHT FOOT PAIN: ICD-10-CM

## 2025-05-19 RX ORDER — SCOPOLAMINE 1 MG/3D
PATCH, EXTENDED RELEASE TRANSDERMAL
Qty: 4 PATCH | Refills: 0 | Status: SHIPPED | OUTPATIENT
Start: 2025-05-19

## 2025-06-30 DIAGNOSIS — M79.671 RIGHT FOOT PAIN: ICD-10-CM

## 2025-07-14 ENCOUNTER — PREP FOR PROCEDURE (OUTPATIENT)
Age: 55
End: 2025-07-14

## 2025-07-14 ENCOUNTER — TELEPHONE (OUTPATIENT)
Age: 55
End: 2025-07-14

## 2025-07-14 DIAGNOSIS — Z86.0100 HISTORY OF COLON POLYPS: Primary | ICD-10-CM

## 2025-07-14 NOTE — TELEPHONE ENCOUNTER
07/14/25  Screened by: Emily Ochoa MA    Referring Provider     Pre- Screening:     BMI   31.18  Has patient been referred for a routine screening Colonoscopy? yes  Is the patient between 45-75 years old? yes      Previous Colonoscopy yes   If yes:    Date: 10/11/22    Facility: [facility]    Reason: Screening      Does the patient want to see a Gastroenterologist prior to their procedure OR are they having any GI symptoms? no    Has the patient been hospitalized or had abdominal surgery in the past 6 months? no    Does the patient use supplemental oxygen? no    Does the patient take Coumadin, Lovenox, Plavix, Elliquis, Xarelto, or other blood thinning medication? no    Has the patient had a stroke, cardiac event, or stent placed in the past year? no      If patient is between 45yrs - 49yrs, please advise patient that we will have to confirm benefits & coverage with their insurance company for a routine screening colonoscopy.

## 2025-07-14 NOTE — LETTER
July 14, 2025    Sandi Vera  [address] [place] [id number]      Dear Ms. Vera:    Attached are your prep instructions for your upcoming procedure on 10/15/25. If you have any questions or concerns please contact us at [phone].    Thank you,     [facility]

## 2025-07-14 NOTE — TELEPHONE ENCOUNTER
Scheduled date of colonoscopy (as of today): 10/15/25  Physician performing colonoscopy:   Location of colonoscopy: [facility]  Bowel prep reviewed with patient: Miralax Dulcolax prep instructions reviewed and sent via [facility]   Instructions reviewed with patient by: md  Clearances:

## 2025-07-16 ENCOUNTER — OFFICE VISIT (OUTPATIENT)
Dept: FAMILY MEDICINE CLINIC | Facility: CLINIC | Age: 55
End: 2025-07-16
Payer: COMMERCIAL

## 2025-07-16 VITALS
HEIGHT: 63 IN | WEIGHT: 177 LBS | BODY MASS INDEX: 31.36 KG/M2 | DIASTOLIC BLOOD PRESSURE: 82 MMHG | OXYGEN SATURATION: 95 % | SYSTOLIC BLOOD PRESSURE: 118 MMHG | HEART RATE: 67 BPM | TEMPERATURE: 97.1 F

## 2025-07-16 DIAGNOSIS — M79.671 RIGHT FOOT PAIN: ICD-10-CM

## 2025-07-16 DIAGNOSIS — K21.00 GASTROESOPHAGEAL REFLUX DISEASE WITH ESOPHAGITIS WITHOUT HEMORRHAGE: Primary | ICD-10-CM

## 2025-07-16 PROCEDURE — 99396 PREV VISIT EST AGE 40-64: CPT | Performed by: FAMILY MEDICINE

## 2025-07-16 RX ORDER — FAMOTIDINE 20 MG/1
20 TABLET, FILM COATED ORAL DAILY
COMMUNITY

## 2025-07-16 RX ORDER — PANTOPRAZOLE SODIUM 20 MG/1
20 TABLET, DELAYED RELEASE ORAL
Qty: 30 TABLET | Refills: 5 | Status: SHIPPED | OUTPATIENT
Start: 2025-07-16 | End: 2026-01-12

## 2025-07-16 NOTE — PROGRESS NOTES
Name: Sandi Vera      : 1970      MRN: [id number]  Encounter Provider: Maycol Michel DO  Encounter Date: 2025   Encounter department: [facility]  :  Assessment & Plan  Right foot pain         Gastroesophageal reflux disease with esophagitis without hemorrhage                History of Present Illness   Emily is here today for a follow-up visit she needed a refill on her diclofenac she has been having a lot of gastric upset and esophageal upset going to switch her to Protonix she has a an appointment with Dr. Stephen in October so I will see if we can quiet down her GERD in the meantime but she will follow through on her GI consult      Review of Systems   Constitutional:  Negative for activity change, appetite change, diaphoresis, fatigue and fever.   HENT: Negative.     Eyes: Negative.    Respiratory:  Negative for apnea, cough, chest tightness, shortness of breath and wheezing.    Cardiovascular:  Negative for chest pain, palpitations and leg swelling.   Gastrointestinal:  Negative for abdominal distention, abdominal pain, anal bleeding, constipation, diarrhea, nausea and vomiting.        Dyspepsia of esophagus patient will be trialed on Protonix   Endocrine: Negative for cold intolerance, heat intolerance, polydipsia, polyphagia and polyuria.   Genitourinary:  Negative for difficulty urinating, dysuria, flank pain, hematuria and urgency.   Musculoskeletal:  Negative for arthralgias, back pain, gait problem, joint swelling and myalgias.   Skin:  Negative for color change, rash and wound.   Allergic/Immunologic: Negative for environmental allergies, food allergies and immunocompromised state.   Neurological:  Negative for dizziness, seizures, syncope, speech difficulty, numbness and headaches.   Hematological:  Negative for adenopathy. Does not bruise/bleed easily.   Psychiatric/Behavioral:  Negative for agitation, behavioral problems, hallucinations, sleep disturbance and suicidal  "ideas.        Objective   /82 (BP Location: Left arm, Patient Position: Sitting, Cuff Size: Standard)   Pulse 67   Temp (!) 97.1 °F (36.2 °C) (Temporal)   Ht 5' 3\" (1.6 m)   Wt 80.3 kg (177 lb)   SpO2 95%   BMI 31.35 kg/m²      Physical Exam  Constitutional:       Appearance: She is well-developed.   HENT:      Head: Normocephalic and atraumatic.      Right Ear: External ear normal.      Left Ear: External ear normal.      Nose: Nose normal.     Eyes:      Conjunctiva/sclera: Conjunctivae normal.      Pupils: Pupils are equal, round, and reactive to light.       Cardiovascular:      Rate and Rhythm: Normal rate and regular rhythm.      Heart sounds: Normal heart sounds. No murmur heard.     No friction rub.   Pulmonary:      Effort: Pulmonary effort is normal. No respiratory distress.      Breath sounds: Normal breath sounds. No wheezing or rales.   Chest:      Chest wall: No tenderness.   Abdominal:      General: Bowel sounds are normal.      Palpations: Abdomen is soft.     Musculoskeletal:         General: Normal range of motion.      Cervical back: Normal range of motion and neck supple.     Skin:     General: Skin is warm and dry.      Capillary Refill: Capillary refill takes 2 to 3 seconds.     Neurological:      Mental Status: She is alert and oriented to person, place, and time.     Psychiatric:         Behavior: Behavior normal.         Thought Content: Thought content normal.         Judgment: Judgment normal.         Answers submitted by the patient for this visit:  Annual Physical (Submitted on 7/16/2025)  Diet/Nutrition choices: well balanced diet, limited junk food, intermittent fasting, consuming 3-5 servings of fruits/vegetables daily  Exercise choices: walking  Sleep choices: sleeps poorly, 7-8 hours of sleep on average  Hearing choices: decreased hearing bilateral ears, tinnitus  Vision choices: vision problems, wears glasses  Dental choices: regular dental visits, brushes teeth twice " daily, floss regularly  Do you currently have an OB/GYN provider that you routinely follow with?: Yes  Menopausal status: perimenopausal  Any history of sexual transmitted disease/infection?: No  Contraception: male partner had vasectomy  Do you have an advance directive/living will?: No  Do you have a durable power of  (POA)?: No

## 2025-07-24 ENCOUNTER — APPOINTMENT (OUTPATIENT)
Dept: LAB | Facility: MEDICAL CENTER | Age: 55
End: 2025-07-24
Attending: PREVENTIVE MEDICINE

## 2025-07-24 ENCOUNTER — TRANSCRIBE ORDERS (OUTPATIENT)
Dept: LAB | Facility: MEDICAL CENTER | Age: 55
End: 2025-07-24

## 2025-07-24 DIAGNOSIS — Z00.8 HEALTH EXAMINATION IN POPULATION SURVEY: ICD-10-CM

## 2025-07-24 DIAGNOSIS — Z00.8 HEALTH EXAMINATION IN POPULATION SURVEY: Primary | ICD-10-CM

## 2025-07-24 LAB
CHOLEST SERPL-MCNC: 238 MG/DL (ref ?–200)
EST. AVERAGE GLUCOSE BLD GHB EST-MCNC: 111 MG/DL
HBA1C MFR BLD: 5.5 %
HDLC SERPL-MCNC: 45 MG/DL
LDLC SERPL CALC-MCNC: 156 MG/DL (ref 0–100)
NONHDLC SERPL-MCNC: 193 MG/DL
TRIGL SERPL-MCNC: 183 MG/DL (ref ?–150)

## 2025-07-24 PROCEDURE — 36415 COLL VENOUS BLD VENIPUNCTURE: CPT

## 2025-07-24 PROCEDURE — 83036 HEMOGLOBIN GLYCOSYLATED A1C: CPT

## 2025-07-24 PROCEDURE — 80061 LIPID PANEL: CPT

## 2025-08-15 ENCOUNTER — HOSPITAL ENCOUNTER (OUTPATIENT)
Dept: NON INVASIVE DIAGNOSTICS | Facility: HOSPITAL | Age: 55
Discharge: HOME | End: 2025-08-15
Payer: COMMERCIAL

## 2025-08-15 ENCOUNTER — OFFICE VISIT (OUTPATIENT)
Dept: FAMILY MEDICINE CLINIC | Facility: CLINIC | Age: 55
End: 2025-08-15
Payer: COMMERCIAL

## 2025-08-18 ENCOUNTER — TELEPHONE (OUTPATIENT)
Dept: FAMILY MEDICINE CLINIC | Facility: CLINIC | Age: 55
End: 2025-08-18

## 2025-08-18 ENCOUNTER — RESULTS FOLLOW-UP (OUTPATIENT)
Dept: FAMILY MEDICINE CLINIC | Facility: CLINIC | Age: 55
End: 2025-08-18

## 2025-08-18 DIAGNOSIS — M79.621 AXILLARY PAIN, RIGHT: Primary | ICD-10-CM

## 2025-08-19 ENCOUNTER — APPOINTMENT (OUTPATIENT)
Dept: LAB | Facility: MEDICAL CENTER | Age: 55
End: 2025-08-19
Attending: FAMILY MEDICINE
Payer: COMMERCIAL

## 2025-08-19 DIAGNOSIS — M79.621 AXILLARY PAIN, RIGHT: ICD-10-CM

## 2025-08-19 LAB
ANION GAP SERPL CALCULATED.3IONS-SCNC: 12 MMOL/L (ref 4–13)
BUN SERPL-MCNC: 11 MG/DL (ref 5–25)
CALCIUM SERPL-MCNC: 9.2 MG/DL (ref 8.4–10.2)
CHLORIDE SERPL-SCNC: 104 MMOL/L (ref 96–108)
CO2 SERPL-SCNC: 25 MMOL/L (ref 21–32)
CREAT SERPL-MCNC: 1.07 MG/DL (ref 0.6–1.3)
D DIMER PPP FEU-MCNC: <0.27 UG/ML FEU
GFR SERPL CREATININE-BSD FRML MDRD: 59 ML/MIN/1.73SQ M
GLUCOSE P FAST SERPL-MCNC: 94 MG/DL (ref 65–99)
POTASSIUM SERPL-SCNC: 3.9 MMOL/L (ref 3.5–5.3)
SODIUM SERPL-SCNC: 141 MMOL/L (ref 135–147)

## 2025-08-19 PROCEDURE — 80048 BASIC METABOLIC PNL TOTAL CA: CPT | Performed by: FAMILY MEDICINE

## 2025-08-19 PROCEDURE — 36415 COLL VENOUS BLD VENIPUNCTURE: CPT

## 2025-08-19 PROCEDURE — 85379 FIBRIN DEGRADATION QUANT: CPT

## 2025-08-21 ENCOUNTER — HOSPITAL ENCOUNTER (OUTPATIENT)
Dept: MAMMOGRAPHY | Facility: HOSPITAL | Age: 55
Discharge: HOME | End: 2025-08-21
Attending: FAMILY MEDICINE
Payer: COMMERCIAL

## 2025-08-21 ENCOUNTER — HOSPITAL ENCOUNTER (OUTPATIENT)
Dept: ULTRASOUND IMAGING | Facility: HOSPITAL | Age: 55
Discharge: HOME | End: 2025-08-21
Attending: FAMILY MEDICINE
Payer: COMMERCIAL

## 2025-08-21 VITALS — BODY MASS INDEX: 31.18 KG/M2 | HEIGHT: 63 IN | WEIGHT: 176 LBS

## 2025-08-21 DIAGNOSIS — M79.621 PAIN IN RIGHT AXILLA: ICD-10-CM

## 2025-08-21 PROCEDURE — 76642 ULTRASOUND BREAST LIMITED: CPT

## 2025-08-21 PROCEDURE — G0279 TOMOSYNTHESIS, MAMMO: HCPCS

## 2025-08-21 PROCEDURE — 77066 DX MAMMO INCL CAD BI: CPT

## (undated) DEVICE — BETHLEHEM UNIVERSAL  MIONR EXT: Brand: CARDINAL HEALTH

## (undated) DEVICE — CUFF TOURNIQUET 18 X 4 IN QUICK CONNECT DISP 1 BLADDER

## (undated) DEVICE — CURITY NON-ADHERENT STRIPS: Brand: CURITY

## (undated) DEVICE — SCD SEQUENTIAL COMPRESSION COMFORT SLEEVE MEDIUM KNEE LENGTH: Brand: KENDALL SCD

## (undated) DEVICE — 2000CC GUARDIAN II: Brand: GUARDIAN

## (undated) DEVICE — PLUMEPEN PRO 10FT

## (undated) DEVICE — SUT VICRYL 4-0 PS-2 27 IN J426H

## (undated) DEVICE — STRETCH BANDAGE: Brand: CURITY

## (undated) DEVICE — NEEDLE 25G X 1 1/2

## (undated) DEVICE — 10FR FRAZIER SUCTION HANDLE: Brand: CARDINAL HEALTH

## (undated) DEVICE — STOCKINETTE REGULAR

## (undated) DEVICE — SYRINGE 3ML LL

## (undated) DEVICE — 22.5 MM X 6.9 MM X 0.53 MM SAGITTAL BLADE

## (undated) DEVICE — SUT VICRYL 3-0 PS-2 27 IN J427H

## (undated) DEVICE — NEEDLE 18 G X 1 1/2

## (undated) DEVICE — CAST PADDING 4 IN SYNTHETIC NON-STRL

## (undated) DEVICE — 4.0 MM X 2.35 MM X 70.0 MM OVAL CARBIDE BUR, 8 FLUTE

## (undated) DEVICE — INTENDED FOR TISSUE SEPARATION, AND OTHER PROCEDURES THAT REQUIRE A SHARP SURGICAL BLADE TO PUNCTURE OR CUT.: Brand: BARD-PARKER ® CARBON RIB-BACK BLADES

## (undated) DEVICE — PREP SURGICAL PURPREP 26ML

## (undated) DEVICE — SUT ETHILON 4-0 PS-2 18 IN 1667H

## (undated) DEVICE — GLOVE SRG BIOGEL 7.5

## (undated) DEVICE — CHLORAPREP HI-LITE 26ML ORANGE

## (undated) DEVICE — SYRINGE 10ML LL